# Patient Record
Sex: FEMALE | Race: WHITE | NOT HISPANIC OR LATINO | URBAN - METROPOLITAN AREA
[De-identification: names, ages, dates, MRNs, and addresses within clinical notes are randomized per-mention and may not be internally consistent; named-entity substitution may affect disease eponyms.]

---

## 2017-04-06 ENCOUNTER — INPATIENT (INPATIENT)
Facility: HOSPITAL | Age: 67
LOS: 4 days | Discharge: HOME | End: 2017-04-11
Attending: INTERNAL MEDICINE | Admitting: INTERNAL MEDICINE
Payer: COMMERCIAL

## 2017-04-06 VITALS
HEIGHT: 67.5 IN | SYSTOLIC BLOOD PRESSURE: 141 MMHG | TEMPERATURE: 98 F | WEIGHT: 175.05 LBS | HEART RATE: 66 BPM | OXYGEN SATURATION: 98 % | DIASTOLIC BLOOD PRESSURE: 90 MMHG | RESPIRATION RATE: 20 BRPM

## 2017-04-06 DIAGNOSIS — F32.9 MAJOR DEPRESSIVE DISORDER, SINGLE EPISODE, UNSPECIFIED: ICD-10-CM

## 2017-04-06 DIAGNOSIS — I10 ESSENTIAL (PRIMARY) HYPERTENSION: ICD-10-CM

## 2017-04-06 DIAGNOSIS — Z95.5 PRESENCE OF CORONARY ANGIOPLASTY IMPLANT AND GRAFT: Chronic | ICD-10-CM

## 2017-04-06 DIAGNOSIS — G56.01 CARPAL TUNNEL SYNDROME, RIGHT UPPER LIMB: Chronic | ICD-10-CM

## 2017-04-06 DIAGNOSIS — R07.9 CHEST PAIN, UNSPECIFIED: ICD-10-CM

## 2017-04-06 DIAGNOSIS — Z98.890 OTHER SPECIFIED POSTPROCEDURAL STATES: Chronic | ICD-10-CM

## 2017-04-06 DIAGNOSIS — E78.5 HYPERLIPIDEMIA, UNSPECIFIED: ICD-10-CM

## 2017-04-06 LAB
ALBUMIN SERPL ELPH-MCNC: 4.3 G/DL — SIGNIFICANT CHANGE UP (ref 3.3–5)
ALP SERPL-CCNC: 73 U/L — SIGNIFICANT CHANGE UP (ref 40–120)
ALT FLD-CCNC: 17 U/L — SIGNIFICANT CHANGE UP (ref 4–33)
APTT BLD: 30.8 SEC — SIGNIFICANT CHANGE UP (ref 27.5–37.4)
AST SERPL-CCNC: 22 U/L — SIGNIFICANT CHANGE UP (ref 4–32)
BASE EXCESS BLDV CALC-SCNC: 4.3 MMOL/L — SIGNIFICANT CHANGE UP
BASOPHILS # BLD AUTO: 0.02 K/UL — SIGNIFICANT CHANGE UP (ref 0–0.2)
BASOPHILS NFR BLD AUTO: 0.2 % — SIGNIFICANT CHANGE UP (ref 0–2)
BILIRUB SERPL-MCNC: 0.6 MG/DL — SIGNIFICANT CHANGE UP (ref 0.2–1.2)
BLOOD GAS VENOUS - CREATININE: 0.53 MG/DL — SIGNIFICANT CHANGE UP (ref 0.5–1.3)
BUN SERPL-MCNC: 20 MG/DL — SIGNIFICANT CHANGE UP (ref 7–23)
CALCIUM SERPL-MCNC: 9.7 MG/DL — SIGNIFICANT CHANGE UP (ref 8.4–10.5)
CHLORIDE BLDV-SCNC: 112 MMOL/L — HIGH (ref 96–108)
CHLORIDE SERPL-SCNC: 102 MMOL/L — SIGNIFICANT CHANGE UP (ref 98–107)
CK MB BLD-MCNC: 2.84 NG/ML — SIGNIFICANT CHANGE UP (ref 1–4.7)
CK MB BLD-MCNC: 3.25 NG/ML — SIGNIFICANT CHANGE UP (ref 1–4.7)
CK MB BLD-MCNC: SIGNIFICANT CHANGE UP (ref 0–2.5)
CK MB BLD-MCNC: SIGNIFICANT CHANGE UP (ref 0–2.5)
CK SERPL-CCNC: 115 U/L — SIGNIFICANT CHANGE UP (ref 25–170)
CK SERPL-CCNC: 136 U/L — SIGNIFICANT CHANGE UP (ref 25–170)
CO2 SERPL-SCNC: 24 MMOL/L — SIGNIFICANT CHANGE UP (ref 22–31)
CREAT SERPL-MCNC: 0.65 MG/DL — SIGNIFICANT CHANGE UP (ref 0.5–1.3)
D DIMER BLD IA.RAPID-MCNC: 200 NG/ML — SIGNIFICANT CHANGE UP
EOSINOPHIL # BLD AUTO: 0.43 K/UL — SIGNIFICANT CHANGE UP (ref 0–0.5)
EOSINOPHIL NFR BLD AUTO: 5.1 % — SIGNIFICANT CHANGE UP (ref 0–6)
GAS PNL BLDV: 134 MMOL/L — LOW (ref 136–146)
GLUCOSE BLDV-MCNC: 102 — HIGH (ref 70–99)
GLUCOSE SERPL-MCNC: 103 MG/DL — HIGH (ref 70–99)
HCO3 BLDV-SCNC: 27 MMOL/L — SIGNIFICANT CHANGE UP (ref 20–27)
HCT VFR BLD CALC: 41 % — SIGNIFICANT CHANGE UP (ref 34.5–45)
HCT VFR BLDV CALC: 44.2 % — SIGNIFICANT CHANGE UP (ref 34.5–45)
HGB BLD-MCNC: 14 G/DL — SIGNIFICANT CHANGE UP (ref 11.5–15.5)
HGB BLDV-MCNC: 14.4 G/DL — SIGNIFICANT CHANGE UP (ref 11.5–15.5)
IMM GRANULOCYTES NFR BLD AUTO: 0.2 % — SIGNIFICANT CHANGE UP (ref 0–1.5)
INR BLD: 0.93 — SIGNIFICANT CHANGE UP (ref 0.88–1.17)
LACTATE BLDV-MCNC: 1.6 MMOL/L — SIGNIFICANT CHANGE UP (ref 0.5–2)
LYMPHOCYTES # BLD AUTO: 1.87 K/UL — SIGNIFICANT CHANGE UP (ref 1–3.3)
LYMPHOCYTES # BLD AUTO: 22.2 % — SIGNIFICANT CHANGE UP (ref 13–44)
MAGNESIUM SERPL-MCNC: 2 MG/DL — SIGNIFICANT CHANGE UP (ref 1.6–2.6)
MCHC RBC-ENTMCNC: 34 PG — SIGNIFICANT CHANGE UP (ref 27–34)
MCHC RBC-ENTMCNC: 34.1 % — SIGNIFICANT CHANGE UP (ref 32–36)
MCV RBC AUTO: 99.5 FL — SIGNIFICANT CHANGE UP (ref 80–100)
MONOCYTES # BLD AUTO: 0.71 K/UL — SIGNIFICANT CHANGE UP (ref 0–0.9)
MONOCYTES NFR BLD AUTO: 8.4 % — SIGNIFICANT CHANGE UP (ref 2–14)
NEUTROPHILS # BLD AUTO: 5.38 K/UL — SIGNIFICANT CHANGE UP (ref 1.8–7.4)
NEUTROPHILS NFR BLD AUTO: 63.9 % — SIGNIFICANT CHANGE UP (ref 43–77)
PCO2 BLDV: 44 MMHG — SIGNIFICANT CHANGE UP (ref 41–51)
PH BLDV: 7.43 PH — SIGNIFICANT CHANGE UP (ref 7.32–7.43)
PLATELET # BLD AUTO: 274 K/UL — SIGNIFICANT CHANGE UP (ref 150–400)
PMV BLD: 12 FL — SIGNIFICANT CHANGE UP (ref 7–13)
PO2 BLDV: 26 MMHG — LOW (ref 35–40)
POTASSIUM BLDV-SCNC: 3.8 MMOL/L — SIGNIFICANT CHANGE UP (ref 3.4–4.5)
POTASSIUM SERPL-MCNC: 4.3 MMOL/L — SIGNIFICANT CHANGE UP (ref 3.5–5.3)
POTASSIUM SERPL-SCNC: 4.3 MMOL/L — SIGNIFICANT CHANGE UP (ref 3.5–5.3)
PROT SERPL-MCNC: 7.4 G/DL — SIGNIFICANT CHANGE UP (ref 6–8.3)
PROTHROM AB SERPL-ACNC: 10.4 SEC — SIGNIFICANT CHANGE UP (ref 9.8–13.1)
RBC # BLD: 4.12 M/UL — SIGNIFICANT CHANGE UP (ref 3.8–5.2)
RBC # FLD: 13.2 % — SIGNIFICANT CHANGE UP (ref 10.3–14.5)
SAO2 % BLDV: 45.8 % — LOW (ref 60–85)
SODIUM SERPL-SCNC: 142 MMOL/L — SIGNIFICANT CHANGE UP (ref 135–145)
TROPONIN T SERPL-MCNC: < 0.06 NG/ML — SIGNIFICANT CHANGE UP (ref 0–0.06)
TROPONIN T SERPL-MCNC: < 0.06 NG/ML — SIGNIFICANT CHANGE UP (ref 0–0.06)
TSH SERPL-MCNC: 1.26 UIU/ML — SIGNIFICANT CHANGE UP (ref 0.27–4.2)
WBC # BLD: 8.43 K/UL — SIGNIFICANT CHANGE UP (ref 3.8–10.5)
WBC # FLD AUTO: 8.43 K/UL — SIGNIFICANT CHANGE UP (ref 3.8–10.5)

## 2017-04-06 PROCEDURE — 71020: CPT | Mod: 26

## 2017-04-06 PROCEDURE — 93970 EXTREMITY STUDY: CPT | Mod: 26

## 2017-04-06 RX ORDER — CITALOPRAM 10 MG/1
20 TABLET, FILM COATED ORAL DAILY
Qty: 0 | Refills: 0 | Status: DISCONTINUED | OUTPATIENT
Start: 2017-04-06 | End: 2017-04-11

## 2017-04-06 RX ORDER — CARVEDILOL PHOSPHATE 80 MG/1
6.25 CAPSULE, EXTENDED RELEASE ORAL EVERY 12 HOURS
Qty: 0 | Refills: 0 | Status: DISCONTINUED | OUTPATIENT
Start: 2017-04-06 | End: 2017-04-11

## 2017-04-06 RX ORDER — VALSARTAN 80 MG/1
320 TABLET ORAL DAILY
Qty: 0 | Refills: 0 | Status: DISCONTINUED | OUTPATIENT
Start: 2017-04-06 | End: 2017-04-11

## 2017-04-06 RX ORDER — SODIUM CHLORIDE 9 MG/ML
3 INJECTION INTRAMUSCULAR; INTRAVENOUS; SUBCUTANEOUS EVERY 8 HOURS
Qty: 0 | Refills: 0 | Status: DISCONTINUED | OUTPATIENT
Start: 2017-04-06 | End: 2017-04-11

## 2017-04-06 RX ORDER — ACETAMINOPHEN 500 MG
650 TABLET ORAL ONCE
Qty: 0 | Refills: 0 | Status: COMPLETED | OUTPATIENT
Start: 2017-04-06 | End: 2017-04-06

## 2017-04-06 RX ORDER — IBUPROFEN 200 MG
600 TABLET ORAL ONCE
Qty: 0 | Refills: 0 | Status: COMPLETED | OUTPATIENT
Start: 2017-04-06 | End: 2017-04-06

## 2017-04-06 RX ORDER — ACETAMINOPHEN 500 MG
650 TABLET ORAL EVERY 6 HOURS
Qty: 0 | Refills: 0 | Status: DISCONTINUED | OUTPATIENT
Start: 2017-04-06 | End: 2017-04-11

## 2017-04-06 RX ORDER — ASPIRIN/CALCIUM CARB/MAGNESIUM 324 MG
81 TABLET ORAL DAILY
Qty: 0 | Refills: 0 | Status: DISCONTINUED | OUTPATIENT
Start: 2017-04-06 | End: 2017-04-11

## 2017-04-06 RX ORDER — ENOXAPARIN SODIUM 100 MG/ML
40 INJECTION SUBCUTANEOUS EVERY 24 HOURS
Qty: 0 | Refills: 0 | Status: DISCONTINUED | OUTPATIENT
Start: 2017-04-06 | End: 2017-04-10

## 2017-04-06 RX ORDER — AMLODIPINE BESYLATE 2.5 MG/1
10 TABLET ORAL DAILY
Qty: 0 | Refills: 0 | Status: DISCONTINUED | OUTPATIENT
Start: 2017-04-06 | End: 2017-04-11

## 2017-04-06 RX ORDER — ATORVASTATIN CALCIUM 80 MG/1
20 TABLET, FILM COATED ORAL AT BEDTIME
Qty: 0 | Refills: 0 | Status: DISCONTINUED | OUTPATIENT
Start: 2017-04-06 | End: 2017-04-11

## 2017-04-06 RX ORDER — IBUPROFEN 200 MG
400 TABLET ORAL ONCE
Qty: 0 | Refills: 0 | Status: DISCONTINUED | OUTPATIENT
Start: 2017-04-06 | End: 2017-04-06

## 2017-04-06 RX ORDER — NICOTINE POLACRILEX 2 MG
1 GUM BUCCAL DAILY
Qty: 0 | Refills: 0 | Status: DISCONTINUED | OUTPATIENT
Start: 2017-04-06 | End: 2017-04-11

## 2017-04-06 RX ADMIN — Medication 600 MILLIGRAM(S): at 09:03

## 2017-04-06 RX ADMIN — Medication 1 PATCH: at 16:40

## 2017-04-06 RX ADMIN — Medication 650 MILLIGRAM(S): at 10:30

## 2017-04-06 RX ADMIN — Medication 600 MILLIGRAM(S): at 10:30

## 2017-04-06 RX ADMIN — SODIUM CHLORIDE 3 MILLILITER(S): 9 INJECTION INTRAMUSCULAR; INTRAVENOUS; SUBCUTANEOUS at 21:34

## 2017-04-06 RX ADMIN — ENOXAPARIN SODIUM 40 MILLIGRAM(S): 100 INJECTION SUBCUTANEOUS at 16:41

## 2017-04-06 RX ADMIN — SODIUM CHLORIDE 3 MILLILITER(S): 9 INJECTION INTRAMUSCULAR; INTRAVENOUS; SUBCUTANEOUS at 15:17

## 2017-04-06 RX ADMIN — ATORVASTATIN CALCIUM 20 MILLIGRAM(S): 80 TABLET, FILM COATED ORAL at 21:30

## 2017-04-06 RX ADMIN — Medication 650 MILLIGRAM(S): at 09:00

## 2017-04-06 NOTE — ED PROVIDER NOTE - FAMILY HISTORY
Father  Still living? Unknown  Family history of myocardial infarction, Age at diagnosis: Age Unknown     Sibling  Still living? Unknown  Family history of myocardial infarction, Age at diagnosis: Age Unknown

## 2017-04-06 NOTE — H&P ADULT. - ASSESSMENT
65 y/o female, with a PmHx of HTN, HLD, Bradycardia, CAD w/3 cardiac stents, Depression, Gerd, is being admitted to telemetry for chest pain r/o acs.

## 2017-04-06 NOTE — H&P ADULT. - NEGATIVE CARDIOVASCULAR SYMPTOMS
no orthopnea/no peripheral edema/no paroxysmal nocturnal dyspnea/no dyspnea on exertion/no palpitations

## 2017-04-06 NOTE — ED ADULT NURSE NOTE - PMH
Bradycardia with 41 - 50 beats per minute    Bradycardia with 51 - 60 beats per minute    CAD (coronary artery disease)  3 stents  GERD (gastroesophageal reflux disease)    Reflux gastritis    Smoker

## 2017-04-06 NOTE — ED PROVIDER NOTE - PROGRESS NOTE DETAILS
Benjie REDDY: Initial laboratory studies wnl, including normal troponin. Bedside US by Dr. Klein and US team revealed normal EF, no pericardial effusions, no pleural effusions, and no pulmonary edema. Added on D-dimer to r/o PE. Discussed with pt's cardiology group, who will come see pt shortly.

## 2017-04-06 NOTE — H&P ADULT. - FAMILY HISTORY
Sibling  Still living? Yes, Estimated age: Age Unknown  Family history of heart disease, Age at diagnosis: Age Unknown     Grandparent  Still living? No  Family history of myocardial infarction, Age at diagnosis: Age Unknown  Family history of breast cancer, Age at diagnosis: Age Unknown     Father  Still living? No  Family history of Alzheimer's disease, Age at diagnosis: Age Unknown

## 2017-04-06 NOTE — H&P ADULT. - NEGATIVE OPHTHALMOLOGIC SYMPTOMS
no photophobia/no loss of vision L/no loss of vision R/no blurred vision R/no blurred vision L/no diplopia

## 2017-04-06 NOTE — H&P ADULT. - PSH
Carpal tunnel syndrome, bilateral  b/l wrist surgery for carpal tunnel syndrome   delivery delivered  x2  S/P foot surgery, right  Bunionectomy and hammer toed 2016  Stented coronary artery  x 3

## 2017-04-06 NOTE — H&P ADULT. - PMH
Bradycardia    CAD (coronary artery disease)  3 stents  Depression    GERD (gastroesophageal reflux disease)    Hyperlipidemia    Hypertension    Reflux gastritis    Smoker

## 2017-04-06 NOTE — ED ADULT NURSE NOTE - OBJECTIVE STATEMENT
Pt received into trauma room B co left sided CP 6/10 that radiates to the back. Pt states pain started yesterday and feels like tightness, as if someone were squeezing her chest. Pt also co diaphoresis last night that woke her up from sleep and Bilateral arm heaviness. Pt A&Ox4, in NAD. Pt denies SOB, ABD pain, N/V/D. Pt states she has PMH 3 cardiac stents, takes 81 mg aspirin. Awaiting MD evaluation. VS as noted. Placed on cardiac monitor, Sinus shade on monitor. 20 G IV inserted to R AC, labs sent. Family at bedside, will continue to monitor for safety and comfort. Pt received into trauma room B co left sided CP 6/10 that radiates to the back. Pt states pain started yesterday and feels like tightness, as if someone were squeezing her chest. Pt also co diaphoresis last night that woke her up from sleep and Bilateral arm heaviness. Pt A&Ox4, in NAD. Pt denies SOB, ABD pain, N/V/D. Pt states she had stress test 2 weeks ago at [facility] and was notrmal; also states she has PMH 3 cardiac stents, takes 81 mg aspirin. Awaiting MD evaluation. VS as noted. Placed on cardiac monitor, Sinus shade on monitor. 20 G IV inserted to R AC, labs sent. Family at bedside, will continue to monitor for safety and comfort. Pt received into trauma room B co left sided CP 6/10 that radiates to the back. Pt states pain started yesterday and feels like tightness, as if someone were squeezing her chest. Pt also co diaphoresis last night that woke her up from sleep and Bilateral arm heaviness. Pt A&Ox4, in NAD. Pt denies SOB, ABD pain, N/V/D. Pt states she had stress test 2 weeks ago at [facility] and was notrmal; also states she has PMH 3 cardiac stents, takes 81 mg aspirin. Awaiting MD evaluation. VS as noted. Placed on cardiac monitor, Sinus shade on monitor. 20 G IV inserted to R AC, labs sent. Family at bedside, will continue to monitor for safety and comfort.  Received report from PM RN. Pt here for c/o chest discomfort and evaluated by MD. IVL in place at right AC 20 gauge and pt received a bedside ultrasound. Awaiting further orders, results and disposition.  MINESH Pires

## 2017-04-06 NOTE — ED ADULT TRIAGE NOTE - CHIEF COMPLAINT QUOTE
Left sided chest pain since last night which woke her up from her sleep . pain mike the left arm and back. pt was sweating when she had the pain. denies SOB or nausea. pt has PMH of cardiac stents x3 , CAD. HTN, cholesterol Left sided chest pain since last night which woke her up from her sleep . pain radiates under the left arm and back. pt was sweating when she had the pain. denies SOB or nausea. pt has PMH of cardiac stents x3 , CAD. HTN, cholesterol

## 2017-04-06 NOTE — ED PROVIDER NOTE - MEDICAL DECISION MAKING DETAILS
66F w/ L sided chest pain, most likely MSK in nature, but with some concerning features for ACS despite recent stress and echo. HEART score 5. Does not PERC out. Wells PE score 0. Basic and cardiac labs, CXR. Will trial tylenol and motrin for relief. ED US team cs for bedside echo. Will reach out to cardiologist group to discuss case.

## 2017-04-06 NOTE — ED ADULT NURSE NOTE - CHIEF COMPLAINT QUOTE
Left sided chest pain since last night which woke her up from her sleep . pain radiates under the left arm and back. pt was sweating when she had the pain. denies SOB or nausea. pt has PMH of cardiac stents x3 , CAD. HTN, cholesterol

## 2017-04-06 NOTE — ED PROVIDER NOTE - PSH
Carpal tunnel syndrome, bilateral  b/l wrist surgery for carpal tunnel syndrome   delivery delivered  x2  Stented coronary artery  x 3

## 2017-04-06 NOTE — H&P ADULT. - RS GEN PE MLT RESP DETAILS PC
no chest wall tenderness/respirations non-labored/breath sounds equal/airway patent/good air movement/clear to auscultation bilaterally

## 2017-04-06 NOTE — ED PROVIDER NOTE - ATTENDING CONTRIBUTION TO CARE
DR. Del Cid, ATTENDING MD-  I performed a face to face bedside interview with patient regarding history of present illness, review of symptoms and past medical history. I completed an independent physical exam.  I have discussed patient's plan of care with the resident.   Documentation as above in the note.     67yo female hx of multiple cardiac problems p/w chest pain since yesterday. It has been persistent since yesterday, but sometimes is worse. Described as tightness, now radiating around left chest. No associated sob, but is worse with deep inspiration. Recent stress negative 3 weeks ago. 1 episode of diaphoresis last night for 30 min. Exam:  well appearing, nad lungs: CTAB Heart: rrr no murmur Abd: soft, NTND Ext: no pedal edema,  Plan: will r/o acs, pt with persistent pain since yesterday morning and normal stress test recently. Possible msk pain, will tx, given pt age and pain worse with deep inspiration, cannot r/o PE without d-dimer, although doubt PE

## 2017-04-06 NOTE — ED PROVIDER NOTE - OBJECTIVE STATEMENT
65 yo female, PMH of GERD/gastritis, CAD with 3 stents, chronic asymptomatic bradycardia (HR ranges from 40s - 50s per pt) and chronic / active smoker (1 ppd x 40) seen by cardiologist within the last month, with an echo and stress  p/w chest pain since yesterday morning. The pain is a left sided pain which is persistent. 67 yo female, PMH of GERD/gastritis, CAD with 3 stents, chronic asymptomatic bradycardia (HR ranges from 40s - 50s per pt) and chronic / active smoker (1 ppd x 40) seen by cardiologist within the last month, with an echo and stress wnl p/w chest pain since yesterday morning. The pain is a left sided pain which is persistent. Pt works in [employer] at [employer] and does heavy lifting on a daily basis. Pt states pain is worse with coughing and laughing. Overnight she was woken up by the left sided chest pain, and pt states at that time she was diaphoretic. She states the pain wraps around to the L scapula and L arm. Pt took 162mg ASA at home this AM prior to coming in. Pt denies SOB, n/v/d, abd pain, LE swelling or pain, fevers, or chills.

## 2017-04-07 LAB
BUN SERPL-MCNC: 16 MG/DL — SIGNIFICANT CHANGE UP (ref 7–23)
CALCIUM SERPL-MCNC: 9.3 MG/DL — SIGNIFICANT CHANGE UP (ref 8.4–10.5)
CHLORIDE SERPL-SCNC: 104 MMOL/L — SIGNIFICANT CHANGE UP (ref 98–107)
CHOLEST SERPL-MCNC: 135 MG/DL — SIGNIFICANT CHANGE UP (ref 120–199)
CO2 SERPL-SCNC: 22 MMOL/L — SIGNIFICANT CHANGE UP (ref 22–31)
CREAT SERPL-MCNC: 0.56 MG/DL — SIGNIFICANT CHANGE UP (ref 0.5–1.3)
GLUCOSE SERPL-MCNC: 92 MG/DL — SIGNIFICANT CHANGE UP (ref 70–99)
HCT VFR BLD CALC: 40.7 % — SIGNIFICANT CHANGE UP (ref 34.5–45)
HDLC SERPL-MCNC: 52 MG/DL — SIGNIFICANT CHANGE UP (ref 45–65)
HGB BLD-MCNC: 13.5 G/DL — SIGNIFICANT CHANGE UP (ref 11.5–15.5)
LIPID PNL WITH DIRECT LDL SERPL: 76 MG/DL — SIGNIFICANT CHANGE UP
MCHC RBC-ENTMCNC: 33.2 % — SIGNIFICANT CHANGE UP (ref 32–36)
MCHC RBC-ENTMCNC: 33.4 PG — SIGNIFICANT CHANGE UP (ref 27–34)
MCV RBC AUTO: 100.7 FL — HIGH (ref 80–100)
PLATELET # BLD AUTO: 249 K/UL — SIGNIFICANT CHANGE UP (ref 150–400)
PMV BLD: 12.3 FL — SIGNIFICANT CHANGE UP (ref 7–13)
POTASSIUM SERPL-MCNC: 3.9 MMOL/L — SIGNIFICANT CHANGE UP (ref 3.5–5.3)
POTASSIUM SERPL-SCNC: 3.9 MMOL/L — SIGNIFICANT CHANGE UP (ref 3.5–5.3)
RBC # BLD: 4.04 M/UL — SIGNIFICANT CHANGE UP (ref 3.8–5.2)
RBC # FLD: 13.4 % — SIGNIFICANT CHANGE UP (ref 10.3–14.5)
SODIUM SERPL-SCNC: 143 MMOL/L — SIGNIFICANT CHANGE UP (ref 135–145)
TRIGL SERPL-MCNC: 61 MG/DL — SIGNIFICANT CHANGE UP (ref 10–149)
WBC # BLD: 6.46 K/UL — SIGNIFICANT CHANGE UP (ref 3.8–10.5)
WBC # FLD AUTO: 6.46 K/UL — SIGNIFICANT CHANGE UP (ref 3.8–10.5)

## 2017-04-07 PROCEDURE — 71275 CT ANGIOGRAPHY CHEST: CPT | Mod: 26

## 2017-04-07 PROCEDURE — 74174 CTA ABD&PLVS W/CONTRAST: CPT | Mod: 26

## 2017-04-07 RX ADMIN — Medication 81 MILLIGRAM(S): at 12:42

## 2017-04-07 RX ADMIN — CITALOPRAM 20 MILLIGRAM(S): 10 TABLET, FILM COATED ORAL at 12:42

## 2017-04-07 RX ADMIN — Medication 1 PATCH: at 12:42

## 2017-04-07 RX ADMIN — SODIUM CHLORIDE 3 MILLILITER(S): 9 INJECTION INTRAMUSCULAR; INTRAVENOUS; SUBCUTANEOUS at 23:17

## 2017-04-07 RX ADMIN — VALSARTAN 320 MILLIGRAM(S): 80 TABLET ORAL at 05:28

## 2017-04-07 RX ADMIN — SODIUM CHLORIDE 3 MILLILITER(S): 9 INJECTION INTRAMUSCULAR; INTRAVENOUS; SUBCUTANEOUS at 05:28

## 2017-04-07 RX ADMIN — ENOXAPARIN SODIUM 40 MILLIGRAM(S): 100 INJECTION SUBCUTANEOUS at 17:20

## 2017-04-07 RX ADMIN — SODIUM CHLORIDE 3 MILLILITER(S): 9 INJECTION INTRAMUSCULAR; INTRAVENOUS; SUBCUTANEOUS at 14:47

## 2017-04-07 RX ADMIN — CARVEDILOL PHOSPHATE 6.25 MILLIGRAM(S): 80 CAPSULE, EXTENDED RELEASE ORAL at 05:28

## 2017-04-07 RX ADMIN — ATORVASTATIN CALCIUM 20 MILLIGRAM(S): 80 TABLET, FILM COATED ORAL at 23:15

## 2017-04-07 RX ADMIN — Medication 1 PATCH: at 16:10

## 2017-04-07 RX ADMIN — AMLODIPINE BESYLATE 10 MILLIGRAM(S): 2.5 TABLET ORAL at 05:28

## 2017-04-08 LAB
BUN SERPL-MCNC: 22 MG/DL — SIGNIFICANT CHANGE UP (ref 7–23)
CALCIUM SERPL-MCNC: 9.5 MG/DL — SIGNIFICANT CHANGE UP (ref 8.4–10.5)
CHLORIDE SERPL-SCNC: 102 MMOL/L — SIGNIFICANT CHANGE UP (ref 98–107)
CO2 SERPL-SCNC: 25 MMOL/L — SIGNIFICANT CHANGE UP (ref 22–31)
CREAT SERPL-MCNC: 0.64 MG/DL — SIGNIFICANT CHANGE UP (ref 0.5–1.3)
GLUCOSE SERPL-MCNC: 92 MG/DL — SIGNIFICANT CHANGE UP (ref 70–99)
HCT VFR BLD CALC: 41.8 % — SIGNIFICANT CHANGE UP (ref 34.5–45)
HGB BLD-MCNC: 13.8 G/DL — SIGNIFICANT CHANGE UP (ref 11.5–15.5)
MAGNESIUM SERPL-MCNC: 2.1 MG/DL — SIGNIFICANT CHANGE UP (ref 1.6–2.6)
MCHC RBC-ENTMCNC: 33 % — SIGNIFICANT CHANGE UP (ref 32–36)
MCHC RBC-ENTMCNC: 33.4 PG — SIGNIFICANT CHANGE UP (ref 27–34)
MCV RBC AUTO: 101.2 FL — HIGH (ref 80–100)
PLATELET # BLD AUTO: 263 K/UL — SIGNIFICANT CHANGE UP (ref 150–400)
PMV BLD: 12.2 FL — SIGNIFICANT CHANGE UP (ref 7–13)
POTASSIUM SERPL-MCNC: 4 MMOL/L — SIGNIFICANT CHANGE UP (ref 3.5–5.3)
POTASSIUM SERPL-SCNC: 4 MMOL/L — SIGNIFICANT CHANGE UP (ref 3.5–5.3)
RBC # BLD: 4.13 M/UL — SIGNIFICANT CHANGE UP (ref 3.8–5.2)
RBC # FLD: 13.5 % — SIGNIFICANT CHANGE UP (ref 10.3–14.5)
SODIUM SERPL-SCNC: 143 MMOL/L — SIGNIFICANT CHANGE UP (ref 135–145)
WBC # BLD: 7.26 K/UL — SIGNIFICANT CHANGE UP (ref 3.8–10.5)
WBC # FLD AUTO: 7.26 K/UL — SIGNIFICANT CHANGE UP (ref 3.8–10.5)

## 2017-04-08 PROCEDURE — 93306 TTE W/DOPPLER COMPLETE: CPT | Mod: 26

## 2017-04-08 RX ADMIN — ENOXAPARIN SODIUM 40 MILLIGRAM(S): 100 INJECTION SUBCUTANEOUS at 18:02

## 2017-04-08 RX ADMIN — VALSARTAN 320 MILLIGRAM(S): 80 TABLET ORAL at 06:02

## 2017-04-08 RX ADMIN — Medication 1 PATCH: at 11:30

## 2017-04-08 RX ADMIN — SODIUM CHLORIDE 3 MILLILITER(S): 9 INJECTION INTRAMUSCULAR; INTRAVENOUS; SUBCUTANEOUS at 06:04

## 2017-04-08 RX ADMIN — Medication 1 PATCH: at 13:19

## 2017-04-08 RX ADMIN — CARVEDILOL PHOSPHATE 6.25 MILLIGRAM(S): 80 CAPSULE, EXTENDED RELEASE ORAL at 06:02

## 2017-04-08 RX ADMIN — SODIUM CHLORIDE 3 MILLILITER(S): 9 INJECTION INTRAMUSCULAR; INTRAVENOUS; SUBCUTANEOUS at 21:50

## 2017-04-08 RX ADMIN — CITALOPRAM 20 MILLIGRAM(S): 10 TABLET, FILM COATED ORAL at 11:30

## 2017-04-08 RX ADMIN — AMLODIPINE BESYLATE 10 MILLIGRAM(S): 2.5 TABLET ORAL at 06:02

## 2017-04-08 RX ADMIN — Medication 81 MILLIGRAM(S): at 11:30

## 2017-04-08 RX ADMIN — ATORVASTATIN CALCIUM 20 MILLIGRAM(S): 80 TABLET, FILM COATED ORAL at 21:49

## 2017-04-08 RX ADMIN — SODIUM CHLORIDE 3 MILLILITER(S): 9 INJECTION INTRAMUSCULAR; INTRAVENOUS; SUBCUTANEOUS at 13:21

## 2017-04-09 LAB
BUN SERPL-MCNC: 23 MG/DL — SIGNIFICANT CHANGE UP (ref 7–23)
CALCIUM SERPL-MCNC: 10 MG/DL — SIGNIFICANT CHANGE UP (ref 8.4–10.5)
CHLORIDE SERPL-SCNC: 101 MMOL/L — SIGNIFICANT CHANGE UP (ref 98–107)
CO2 SERPL-SCNC: 26 MMOL/L — SIGNIFICANT CHANGE UP (ref 22–31)
CREAT SERPL-MCNC: 0.78 MG/DL — SIGNIFICANT CHANGE UP (ref 0.5–1.3)
GLUCOSE SERPL-MCNC: 136 MG/DL — HIGH (ref 70–99)
HBA1C BLD-MCNC: 5.4 % — SIGNIFICANT CHANGE UP (ref 4–5.6)
HCT VFR BLD CALC: 44.7 % — SIGNIFICANT CHANGE UP (ref 34.5–45)
HGB BLD-MCNC: 14.7 G/DL — SIGNIFICANT CHANGE UP (ref 11.5–15.5)
MCHC RBC-ENTMCNC: 32.9 % — SIGNIFICANT CHANGE UP (ref 32–36)
MCHC RBC-ENTMCNC: 33.5 PG — SIGNIFICANT CHANGE UP (ref 27–34)
MCV RBC AUTO: 101.8 FL — HIGH (ref 80–100)
PLATELET # BLD AUTO: 276 K/UL — SIGNIFICANT CHANGE UP (ref 150–400)
PMV BLD: 12.4 FL — SIGNIFICANT CHANGE UP (ref 7–13)
POTASSIUM SERPL-MCNC: 4.3 MMOL/L — SIGNIFICANT CHANGE UP (ref 3.5–5.3)
POTASSIUM SERPL-SCNC: 4.3 MMOL/L — SIGNIFICANT CHANGE UP (ref 3.5–5.3)
RBC # BLD: 4.39 M/UL — SIGNIFICANT CHANGE UP (ref 3.8–5.2)
RBC # FLD: 13.2 % — SIGNIFICANT CHANGE UP (ref 10.3–14.5)
SODIUM SERPL-SCNC: 143 MMOL/L — SIGNIFICANT CHANGE UP (ref 135–145)
WBC # BLD: 6.52 K/UL — SIGNIFICANT CHANGE UP (ref 3.8–10.5)
WBC # FLD AUTO: 6.52 K/UL — SIGNIFICANT CHANGE UP (ref 3.8–10.5)

## 2017-04-09 RX ADMIN — ENOXAPARIN SODIUM 40 MILLIGRAM(S): 100 INJECTION SUBCUTANEOUS at 17:41

## 2017-04-09 RX ADMIN — CITALOPRAM 20 MILLIGRAM(S): 10 TABLET, FILM COATED ORAL at 12:41

## 2017-04-09 RX ADMIN — VALSARTAN 320 MILLIGRAM(S): 80 TABLET ORAL at 05:45

## 2017-04-09 RX ADMIN — CARVEDILOL PHOSPHATE 6.25 MILLIGRAM(S): 80 CAPSULE, EXTENDED RELEASE ORAL at 05:45

## 2017-04-09 RX ADMIN — Medication 1 PATCH: at 12:39

## 2017-04-09 RX ADMIN — Medication 1 PATCH: at 12:41

## 2017-04-09 RX ADMIN — SODIUM CHLORIDE 3 MILLILITER(S): 9 INJECTION INTRAMUSCULAR; INTRAVENOUS; SUBCUTANEOUS at 05:47

## 2017-04-09 RX ADMIN — SODIUM CHLORIDE 3 MILLILITER(S): 9 INJECTION INTRAMUSCULAR; INTRAVENOUS; SUBCUTANEOUS at 22:23

## 2017-04-09 RX ADMIN — AMLODIPINE BESYLATE 10 MILLIGRAM(S): 2.5 TABLET ORAL at 05:46

## 2017-04-09 RX ADMIN — ATORVASTATIN CALCIUM 20 MILLIGRAM(S): 80 TABLET, FILM COATED ORAL at 22:22

## 2017-04-09 RX ADMIN — Medication 81 MILLIGRAM(S): at 12:41

## 2017-04-09 RX ADMIN — SODIUM CHLORIDE 3 MILLILITER(S): 9 INJECTION INTRAMUSCULAR; INTRAVENOUS; SUBCUTANEOUS at 14:41

## 2017-04-10 LAB
BUN SERPL-MCNC: 24 MG/DL — HIGH (ref 7–23)
CALCIUM SERPL-MCNC: 9.4 MG/DL — SIGNIFICANT CHANGE UP (ref 8.4–10.5)
CHLORIDE SERPL-SCNC: 104 MMOL/L — SIGNIFICANT CHANGE UP (ref 98–107)
CO2 SERPL-SCNC: 24 MMOL/L — SIGNIFICANT CHANGE UP (ref 22–31)
CREAT SERPL-MCNC: 0.6 MG/DL — SIGNIFICANT CHANGE UP (ref 0.5–1.3)
GLUCOSE SERPL-MCNC: 89 MG/DL — SIGNIFICANT CHANGE UP (ref 70–99)
HCT VFR BLD CALC: 41 % — SIGNIFICANT CHANGE UP (ref 34.5–45)
HGB BLD-MCNC: 13.6 G/DL — SIGNIFICANT CHANGE UP (ref 11.5–15.5)
MAGNESIUM SERPL-MCNC: 2 MG/DL — SIGNIFICANT CHANGE UP (ref 1.6–2.6)
MCHC RBC-ENTMCNC: 33.2 % — SIGNIFICANT CHANGE UP (ref 32–36)
MCHC RBC-ENTMCNC: 33.4 PG — SIGNIFICANT CHANGE UP (ref 27–34)
MCV RBC AUTO: 100.7 FL — HIGH (ref 80–100)
PLATELET # BLD AUTO: 251 K/UL — SIGNIFICANT CHANGE UP (ref 150–400)
PMV BLD: 12 FL — SIGNIFICANT CHANGE UP (ref 7–13)
POTASSIUM SERPL-MCNC: 3.7 MMOL/L — SIGNIFICANT CHANGE UP (ref 3.5–5.3)
POTASSIUM SERPL-SCNC: 3.7 MMOL/L — SIGNIFICANT CHANGE UP (ref 3.5–5.3)
RBC # BLD: 4.07 M/UL — SIGNIFICANT CHANGE UP (ref 3.8–5.2)
RBC # FLD: 13.3 % — SIGNIFICANT CHANGE UP (ref 10.3–14.5)
SODIUM SERPL-SCNC: 142 MMOL/L — SIGNIFICANT CHANGE UP (ref 135–145)
WBC # BLD: 7.77 K/UL — SIGNIFICANT CHANGE UP (ref 3.8–10.5)
WBC # FLD AUTO: 7.77 K/UL — SIGNIFICANT CHANGE UP (ref 3.8–10.5)

## 2017-04-10 PROCEDURE — 93010 ELECTROCARDIOGRAM REPORT: CPT

## 2017-04-10 RX ORDER — CLOPIDOGREL BISULFATE 75 MG/1
75 TABLET, FILM COATED ORAL DAILY
Qty: 0 | Refills: 0 | Status: DISCONTINUED | OUTPATIENT
Start: 2017-04-11 | End: 2017-04-11

## 2017-04-10 RX ORDER — SODIUM CHLORIDE 9 MG/ML
500 INJECTION INTRAMUSCULAR; INTRAVENOUS; SUBCUTANEOUS
Qty: 0 | Refills: 0 | Status: DISCONTINUED | OUTPATIENT
Start: 2017-04-10 | End: 2017-04-11

## 2017-04-10 RX ADMIN — Medication 81 MILLIGRAM(S): at 12:36

## 2017-04-10 RX ADMIN — Medication 1 PATCH: at 22:42

## 2017-04-10 RX ADMIN — VALSARTAN 320 MILLIGRAM(S): 80 TABLET ORAL at 05:13

## 2017-04-10 RX ADMIN — AMLODIPINE BESYLATE 10 MILLIGRAM(S): 2.5 TABLET ORAL at 05:13

## 2017-04-10 RX ADMIN — ATORVASTATIN CALCIUM 20 MILLIGRAM(S): 80 TABLET, FILM COATED ORAL at 21:37

## 2017-04-10 RX ADMIN — SODIUM CHLORIDE 3 MILLILITER(S): 9 INJECTION INTRAMUSCULAR; INTRAVENOUS; SUBCUTANEOUS at 05:10

## 2017-04-10 RX ADMIN — SODIUM CHLORIDE 3 MILLILITER(S): 9 INJECTION INTRAMUSCULAR; INTRAVENOUS; SUBCUTANEOUS at 21:38

## 2017-04-10 RX ADMIN — SODIUM CHLORIDE 3 MILLILITER(S): 9 INJECTION INTRAMUSCULAR; INTRAVENOUS; SUBCUTANEOUS at 15:37

## 2017-04-10 RX ADMIN — SODIUM CHLORIDE 75 MILLILITER(S): 9 INJECTION INTRAMUSCULAR; INTRAVENOUS; SUBCUTANEOUS at 15:38

## 2017-04-10 RX ADMIN — CARVEDILOL PHOSPHATE 6.25 MILLIGRAM(S): 80 CAPSULE, EXTENDED RELEASE ORAL at 05:13

## 2017-04-11 VITALS — WEIGHT: 181.88 LBS

## 2017-04-11 LAB
BUN SERPL-MCNC: 22 MG/DL — SIGNIFICANT CHANGE UP (ref 7–23)
CALCIUM SERPL-MCNC: 9.3 MG/DL — SIGNIFICANT CHANGE UP (ref 8.4–10.5)
CHLORIDE SERPL-SCNC: 103 MMOL/L — SIGNIFICANT CHANGE UP (ref 98–107)
CO2 SERPL-SCNC: 24 MMOL/L — SIGNIFICANT CHANGE UP (ref 22–31)
CREAT SERPL-MCNC: 0.68 MG/DL — SIGNIFICANT CHANGE UP (ref 0.5–1.3)
GLUCOSE SERPL-MCNC: 88 MG/DL — SIGNIFICANT CHANGE UP (ref 70–99)
HCT VFR BLD CALC: 40.1 % — SIGNIFICANT CHANGE UP (ref 34.5–45)
HGB BLD-MCNC: 13.3 G/DL — SIGNIFICANT CHANGE UP (ref 11.5–15.5)
MAGNESIUM SERPL-MCNC: 2 MG/DL — SIGNIFICANT CHANGE UP (ref 1.6–2.6)
MCHC RBC-ENTMCNC: 33.2 % — SIGNIFICANT CHANGE UP (ref 32–36)
MCHC RBC-ENTMCNC: 33.3 PG — SIGNIFICANT CHANGE UP (ref 27–34)
MCV RBC AUTO: 100.5 FL — HIGH (ref 80–100)
PLATELET # BLD AUTO: 247 K/UL — SIGNIFICANT CHANGE UP (ref 150–400)
PMV BLD: 12.3 FL — SIGNIFICANT CHANGE UP (ref 7–13)
POTASSIUM SERPL-MCNC: 4.1 MMOL/L — SIGNIFICANT CHANGE UP (ref 3.5–5.3)
POTASSIUM SERPL-SCNC: 4.1 MMOL/L — SIGNIFICANT CHANGE UP (ref 3.5–5.3)
RBC # BLD: 3.99 M/UL — SIGNIFICANT CHANGE UP (ref 3.8–5.2)
RBC # FLD: 13.1 % — SIGNIFICANT CHANGE UP (ref 10.3–14.5)
SODIUM SERPL-SCNC: 143 MMOL/L — SIGNIFICANT CHANGE UP (ref 135–145)
WBC # BLD: 7.59 K/UL — SIGNIFICANT CHANGE UP (ref 3.8–10.5)
WBC # FLD AUTO: 7.59 K/UL — SIGNIFICANT CHANGE UP (ref 3.8–10.5)

## 2017-04-11 RX ORDER — CLOPIDOGREL BISULFATE 75 MG/1
1 TABLET, FILM COATED ORAL
Qty: 30 | Refills: 0 | OUTPATIENT
Start: 2017-04-11 | End: 2017-05-11

## 2017-04-11 RX ORDER — NICOTINE POLACRILEX 2 MG
14 GUM BUCCAL
Qty: 45 | Refills: 0 | OUTPATIENT
Start: 2017-04-11 | End: 2017-05-26

## 2017-04-11 RX ORDER — ACETAMINOPHEN 500 MG
2 TABLET ORAL
Qty: 0 | Refills: 0 | COMMUNITY
Start: 2017-04-11

## 2017-04-11 RX ADMIN — Medication 1 PATCH: at 11:09

## 2017-04-11 RX ADMIN — CLOPIDOGREL BISULFATE 75 MILLIGRAM(S): 75 TABLET, FILM COATED ORAL at 11:10

## 2017-04-11 RX ADMIN — CITALOPRAM 20 MILLIGRAM(S): 10 TABLET, FILM COATED ORAL at 11:10

## 2017-04-11 RX ADMIN — Medication 81 MILLIGRAM(S): at 11:10

## 2017-04-11 RX ADMIN — SODIUM CHLORIDE 3 MILLILITER(S): 9 INJECTION INTRAMUSCULAR; INTRAVENOUS; SUBCUTANEOUS at 05:12

## 2017-04-11 RX ADMIN — AMLODIPINE BESYLATE 10 MILLIGRAM(S): 2.5 TABLET ORAL at 05:11

## 2017-04-11 NOTE — DISCHARGE NOTE ADULT - NS AS ACTIVITY OBS
No heavy lifting greater than 5-10 pounds with right wrist x one week. No strenuous activity x 3 weeks. Monitor site of procedure and notify your doctor for any redness, swelling, discharge No Heavy lifting/straining/No heavy lifting greater than 5-10 pounds with right wrist x one week. No strenuous activity x 3 weeks. Monitor site of procedure and notify your doctor for any redness, swelling, discharge/Stairs allowed/Walking-Outdoors allowed/Showering allowed/Walking-Indoors allowed

## 2017-04-11 NOTE — DISCHARGE NOTE ADULT - HOSPITAL COURSE
65 y/o female, with a PmHx of CAD w/3 stents, Gerd, Chronic Bradycardia, presented with chest pain.  EKG: Sinus shade @ 58, LVH, cardiac enzyme negative,  CXR - normal chest Xray.  Lower Extremity doppler - no evidence of bilateral lower extremity deep vein thrombosis. Nonvisualization of the left calf veins.  CT Angio chest/abdomen/pelvis was done to rule pulmonary embolism but it was negative.   Echo - EF 65%, Normal left ventricular internal dimensions and wall thicknesses.  Normal left ventricular systolic function. No segmental wall motion abnormalities.  Mild diastolic dysfunction (Stage I).   Normal right ventricular size and function. CATH: EF normal, pRCA 80 treated with drug eluting stent x2, prior RCA stent patent, Right radial site stable no hematoma, no bleeding +radial pulse.

## 2017-04-11 NOTE — DISCHARGE NOTE ADULT - OTHER SIGNIFICANT FINDINGS
65 y/o female, with a PmHx of CAD w/3 stents, Gerd, Chronic Bradycardia, presented with chest pain.  EKG: Sinus shade @ 58, LVH, cardiac enzyme negative,  CXR - normal chest Xray.  Lower Extremity doppler - no evidence of bilateral lower extremity deep vein thrombosis. Nonvisualization of the left calf veins.  CT Angio chest/abdomen/pelvis was done to rule pulmonary embolism but it was negative.   Echo - EF 65%, Normal left ventricular internal dimensions and wall thicknesses.  Normal left ventricular systolic function. No segmental wall motion abnormalities.  Mild diastolic dysfunction (Stage I).   Normal right ventricular size and function. CATH: EF normal, pRCA 80 treated with drug eluting stent x2, prior RCA stent patent, as above

## 2017-04-11 NOTE — DISCHARGE NOTE ADULT - CARE PLAN
Principal Discharge DX:	CAD (coronary artery disease)  Goal:	To prevent chest pain, heart attack and worsening coronary artery disease  Instructions for follow-up, activity and diet:	Continue aspirin and Plavix, do not stop unless instructed by physician.  Follow up with your cardiologist Dr. Mendoza on April 14, 2017 at 3:00 PM  Secondary Diagnosis:	Hyperlipidemia  Goal:	Maintain normal cholesterol levels to prevent stroke and heart attack  Instructions for follow-up, activity and diet:	Low fat diet, Continue current medications. Follow up with primary care physician and cardiologist  Secondary Diagnosis:	Hypertension  Goal:	To maintain a normal blood pressure to prevent heart attack, stroke and renal failure  Instructions for follow-up, activity and diet:	Low sodium diet, Follow up with primary care physician.  Continue anti-hypertensive medications  Secondary Diagnosis:	Depression Principal Discharge DX:	CAD (coronary artery disease)  Goal:	To prevent chest pain, heart attack and worsening coronary artery disease  Instructions for follow-up, activity and diet:	Continue aspirin and Plavix, do not stop unless instructed by physician.  Follow up with your cardiologist Dr. Mendoza on April 14, 2017 at 3:00 PM  Secondary Diagnosis:	Hyperlipidemia  Goal:	Maintain normal cholesterol levels to prevent stroke and heart attack  Instructions for follow-up, activity and diet:	Low fat diet, Continue current medications. Follow up with primary care physician and cardiologist  Secondary Diagnosis:	Hypertension  Goal:	To maintain a normal blood pressure to prevent heart attack, stroke and renal failure  Instructions for follow-up, activity and diet:	Low sodium diet, Follow up with primary care physician.  Continue anti-hypertensive medications

## 2017-04-11 NOTE — DISCHARGE NOTE ADULT - PATIENT PORTAL LINK FT
“You can access the [facility] Patient Portal, offered by [facility], by registering with the following website: [email or web address]”

## 2017-04-11 NOTE — DISCHARGE NOTE ADULT - MEDICATION SUMMARY - MEDICATIONS TO TAKE
I will START or STAY ON the medications listed below when I get home from the hospital:    aspirin 81 mg oral tablet  -- 1 tab(s) by mouth once a day  -- Indication: For Chest pain    acetaminophen 325 mg oral tablet  -- 2 tab(s) by mouth every 6 hours, As needed, mild, moderate pain  -- Indication: For Pain     CeleXA 20 mg oral tablet  -- 1 tab(s) by mouth once a day  -- Indication: For Depression    Lipitor 40 mg oral tablet  -- 1 tab(s) by mouth once a day  -- Indication: For Hyperlipidemia    Diovan  mg-25 mg oral tablet  -- 1 tab(s) by mouth once a day  -- Indication: For Hypertension    clopidogrel 75 mg oral tablet  -- 1 tab(s) by mouth once a day  -- Indication: For Coronary artery disease     Coreg 6.25 mg oral tablet  -- 1 tab(s) by mouth 2 times a day  -- Indication: For Hypertension    Norvasc 10 mg oral tablet  -- 1 tab(s) by mouth once a day  -- Indication: For Hypertension    nicotine 14 mg/24 hr transdermal film, extended release  -- 14 mg/24h by transdermal patch once a day  -- Indication: For Smoking cessation

## 2017-04-11 NOTE — DISCHARGE NOTE ADULT - PLAN OF CARE
To prevent chest pain, heart attack and worsening coronary artery disease Continue aspirin and Plavix, do not stop unless instructed by physician.  Follow up with your cardiologist Dr. Mendoza on April 14, 2017 at 3:00 PM Maintain normal cholesterol levels to prevent stroke and heart attack Low fat diet, Continue current medications. Follow up with primary care physician and cardiologist To maintain a normal blood pressure to prevent heart attack, stroke and renal failure Low sodium diet, Follow up with primary care physician.  Continue anti-hypertensive medications

## 2017-04-11 NOTE — DISCHARGE NOTE ADULT - CARE PROVIDER_API CALL
Becca Mendoza), Cardiovascular Disease; Internal Medicine  [address] [facility] [address] [place] [address]  Phone: [phone]  Fax: [phone]

## 2018-01-19 NOTE — H&P ADULT. - PROBLEM SELECTOR PLAN 1
Additional Safety/Bands:
ekg/telemetry, f/u ce x 2, mg, tsh, echo, CTA chest/abd/pelvis/plan is for a cardiac cath in Am 4/7.

## 2018-07-23 ENCOUNTER — INPATIENT (INPATIENT)
Facility: HOSPITAL | Age: 68
LOS: 1 days | Discharge: HOME | End: 2018-07-25
Attending: INTERNAL MEDICINE | Admitting: INTERNAL MEDICINE
Payer: COMMERCIAL

## 2018-07-23 VITALS
TEMPERATURE: 98 F | RESPIRATION RATE: 18 BRPM | SYSTOLIC BLOOD PRESSURE: 156 MMHG | OXYGEN SATURATION: 99 % | DIASTOLIC BLOOD PRESSURE: 85 MMHG | HEART RATE: 58 BPM

## 2018-07-23 DIAGNOSIS — R07.9 CHEST PAIN, UNSPECIFIED: ICD-10-CM

## 2018-07-23 DIAGNOSIS — G56.01 CARPAL TUNNEL SYNDROME, RIGHT UPPER LIMB: Chronic | ICD-10-CM

## 2018-07-23 DIAGNOSIS — Z95.5 PRESENCE OF CORONARY ANGIOPLASTY IMPLANT AND GRAFT: Chronic | ICD-10-CM

## 2018-07-23 DIAGNOSIS — Z98.890 OTHER SPECIFIED POSTPROCEDURAL STATES: Chronic | ICD-10-CM

## 2018-07-23 LAB
ALBUMIN SERPL ELPH-MCNC: 4.2 G/DL — SIGNIFICANT CHANGE UP (ref 3.3–5)
ALP SERPL-CCNC: 90 U/L — SIGNIFICANT CHANGE UP (ref 40–120)
ALT FLD-CCNC: 17 U/L — SIGNIFICANT CHANGE UP (ref 4–33)
AST SERPL-CCNC: 23 U/L — SIGNIFICANT CHANGE UP (ref 4–32)
BASE EXCESS BLDV CALC-SCNC: 1.4 MMOL/L — SIGNIFICANT CHANGE UP
BASOPHILS # BLD AUTO: 0.03 K/UL — SIGNIFICANT CHANGE UP (ref 0–0.2)
BASOPHILS NFR BLD AUTO: 0.4 % — SIGNIFICANT CHANGE UP (ref 0–2)
BILIRUB SERPL-MCNC: 0.5 MG/DL — SIGNIFICANT CHANGE UP (ref 0.2–1.2)
BLOOD GAS VENOUS - CREATININE: 0.56 MG/DL — SIGNIFICANT CHANGE UP (ref 0.5–1.3)
BUN SERPL-MCNC: 10 MG/DL — SIGNIFICANT CHANGE UP (ref 7–23)
CALCIUM SERPL-MCNC: 8.9 MG/DL — SIGNIFICANT CHANGE UP (ref 8.4–10.5)
CHLORIDE BLDV-SCNC: 99 MMOL/L — SIGNIFICANT CHANGE UP (ref 96–108)
CHLORIDE SERPL-SCNC: 96 MMOL/L — LOW (ref 98–107)
CO2 SERPL-SCNC: 24 MMOL/L — SIGNIFICANT CHANGE UP (ref 22–31)
CREAT SERPL-MCNC: 0.61 MG/DL — SIGNIFICANT CHANGE UP (ref 0.5–1.3)
EOSINOPHIL # BLD AUTO: 0.37 K/UL — SIGNIFICANT CHANGE UP (ref 0–0.5)
EOSINOPHIL NFR BLD AUTO: 4.3 % — SIGNIFICANT CHANGE UP (ref 0–6)
GAS PNL BLDV: 132 MMOL/L — LOW (ref 136–146)
GLUCOSE BLDV-MCNC: 86 — SIGNIFICANT CHANGE UP (ref 70–99)
GLUCOSE SERPL-MCNC: 85 MG/DL — SIGNIFICANT CHANGE UP (ref 70–99)
HCO3 BLDV-SCNC: 25 MMOL/L — SIGNIFICANT CHANGE UP (ref 20–27)
HCT VFR BLD CALC: 36.9 % — SIGNIFICANT CHANGE UP (ref 34.5–45)
HCT VFR BLDV CALC: 38.9 % — SIGNIFICANT CHANGE UP (ref 34.5–45)
HGB BLD-MCNC: 12.6 G/DL — SIGNIFICANT CHANGE UP (ref 11.5–15.5)
HGB BLDV-MCNC: 12.6 G/DL — SIGNIFICANT CHANGE UP (ref 11.5–15.5)
IMM GRANULOCYTES # BLD AUTO: 0.03 # — SIGNIFICANT CHANGE UP
IMM GRANULOCYTES NFR BLD AUTO: 0.4 % — SIGNIFICANT CHANGE UP (ref 0–1.5)
INR BLD: 0.98 — SIGNIFICANT CHANGE UP (ref 0.88–1.17)
LACTATE BLDV-MCNC: 1.7 MMOL/L — SIGNIFICANT CHANGE UP (ref 0.5–2)
LYMPHOCYTES # BLD AUTO: 2.77 K/UL — SIGNIFICANT CHANGE UP (ref 1–3.3)
LYMPHOCYTES # BLD AUTO: 32.5 % — SIGNIFICANT CHANGE UP (ref 13–44)
MCHC RBC-ENTMCNC: 33.1 PG — SIGNIFICANT CHANGE UP (ref 27–34)
MCHC RBC-ENTMCNC: 34.1 % — SIGNIFICANT CHANGE UP (ref 32–36)
MCV RBC AUTO: 96.9 FL — SIGNIFICANT CHANGE UP (ref 80–100)
MONOCYTES # BLD AUTO: 0.8 K/UL — SIGNIFICANT CHANGE UP (ref 0–0.9)
MONOCYTES NFR BLD AUTO: 9.4 % — SIGNIFICANT CHANGE UP (ref 2–14)
NEUTROPHILS # BLD AUTO: 4.52 K/UL — SIGNIFICANT CHANGE UP (ref 1.8–7.4)
NEUTROPHILS NFR BLD AUTO: 53 % — SIGNIFICANT CHANGE UP (ref 43–77)
NRBC # FLD: 0 — SIGNIFICANT CHANGE UP
PCO2 BLDV: 45 MMHG — SIGNIFICANT CHANGE UP (ref 41–51)
PH BLDV: 7.38 PH — SIGNIFICANT CHANGE UP (ref 7.32–7.43)
PLATELET # BLD AUTO: 264 K/UL — SIGNIFICANT CHANGE UP (ref 150–400)
PMV BLD: 11 FL — SIGNIFICANT CHANGE UP (ref 7–13)
PO2 BLDV: 34 MMHG — LOW (ref 35–40)
POTASSIUM BLDV-SCNC: 3.7 MMOL/L — SIGNIFICANT CHANGE UP (ref 3.4–4.5)
POTASSIUM SERPL-MCNC: 3.9 MMOL/L — SIGNIFICANT CHANGE UP (ref 3.5–5.3)
POTASSIUM SERPL-SCNC: 3.9 MMOL/L — SIGNIFICANT CHANGE UP (ref 3.5–5.3)
PROT SERPL-MCNC: 7.3 G/DL — SIGNIFICANT CHANGE UP (ref 6–8.3)
PROTHROM AB SERPL-ACNC: 11.3 SEC — SIGNIFICANT CHANGE UP (ref 9.8–13.1)
RBC # BLD: 3.81 M/UL — SIGNIFICANT CHANGE UP (ref 3.8–5.2)
RBC # FLD: 12.8 % — SIGNIFICANT CHANGE UP (ref 10.3–14.5)
SAO2 % BLDV: 59.7 % — LOW (ref 60–85)
SODIUM SERPL-SCNC: 133 MMOL/L — LOW (ref 135–145)
TROPONIN T, HIGH SENSITIVITY: 8 NG/L — SIGNIFICANT CHANGE UP (ref ?–14)
TROPONIN T, HIGH SENSITIVITY: 8 NG/L — SIGNIFICANT CHANGE UP (ref ?–14)
WBC # BLD: 8.52 K/UL — SIGNIFICANT CHANGE UP (ref 3.8–10.5)
WBC # FLD AUTO: 8.52 K/UL — SIGNIFICANT CHANGE UP (ref 3.8–10.5)

## 2018-07-23 PROCEDURE — 71046 X-RAY EXAM CHEST 2 VIEWS: CPT | Mod: 26

## 2018-07-23 RX ORDER — ASPIRIN/CALCIUM CARB/MAGNESIUM 324 MG
324 TABLET ORAL ONCE
Qty: 0 | Refills: 0 | Status: COMPLETED | OUTPATIENT
Start: 2018-07-23 | End: 2018-07-23

## 2018-07-23 RX ADMIN — Medication 324 MILLIGRAM(S): at 18:13

## 2018-07-23 NOTE — ED PROVIDER NOTE - ATTENDING CONTRIBUTION TO CARE
DR. RODRIGUEZ, ATTENDING MD-  I performed a face to face bedside interview with patient regarding history of present illness, review of symptoms and past medical history. I completed an independent physical exam.  I have discussed patient's plan of care with the resident.   Documentation as above in the note.    Lenny: h/o CAD, presents with 2 weeks of "squeezing" intermittent left CP c/w past chest pains leading to her stents.  Increasing in intensity including at rest.  EKg unchanged from previous.  On exam, well appearing comfortable, lungs clear, heart rrr, abd soft nt.  A/P: chest pain, to be admitted, concern for unsatble angina

## 2018-07-23 NOTE — ED PROVIDER NOTE - MEDICAL DECISION MAKING DETAILS
68f w hx CAD s/p 5 stents on asa/plavix, HTN, HLD, every-day smoker, here c/o 2 wks cp. NAD, nl ekg unchanged from previous. Low susp for ACS given timecourse, however pt w sig risk factors and c/o feeling similar to previous cath. Labs incl CE, cxr, asa, adm

## 2018-07-23 NOTE — ED ADULT NURSE NOTE - CHIEF COMPLAINT QUOTE
Pt with a pmhx of CAD 5 stents presents with c/o twisting left sided chest pain and sob x 2 weeks with a worsening of severity x 1 day. States symptoms are similar to when she required a stent

## 2018-07-23 NOTE — ED ADULT NURSE NOTE - OBJECTIVE STATEMENT
received pt room 16 AOX 4 c/o chest pain, SOB, cough X2 weeks worsening today. Describes CP as heaviness, tightening and twisting, SOB on exertion and rest, arm numbness yesterday. Lung sounds clear, strong peripheral pulses radial/pedal NSR on monitor. Demetris any N/V swelling. Has significant cardiac hx including HTN, CAD 5 stents placed last one April '17, smoke 1 pack/day x45 yr interested in quitting, drinks beer daily. Took all medication this AM

## 2018-07-23 NOTE — ED PROVIDER NOTE - OBJECTIVE STATEMENT
68f w hx CAD s/p 5 stents on asa/plavix, HTN, HLD, every-day smoker, here c/o 2 wks cp. Describes as left-sided, non-radiating, intermittent, twisting in nature. Not associated w exertion, worst when seated. Accompanied by SOB. Said felt left arm numbness at some pt in past 2 wks but resolved on its own quickly. Says feels similar to when needed stents in the past. Currently is cp-free. No nausea or diaphoresis. Cough but no fever, no sore throat/nasal congestion.     cardiologistParveen Mendoza

## 2018-07-24 DIAGNOSIS — I10 ESSENTIAL (PRIMARY) HYPERTENSION: ICD-10-CM

## 2018-07-24 DIAGNOSIS — E78.5 HYPERLIPIDEMIA, UNSPECIFIED: ICD-10-CM

## 2018-07-24 DIAGNOSIS — I24.9 ACUTE ISCHEMIC HEART DISEASE, UNSPECIFIED: ICD-10-CM

## 2018-07-24 DIAGNOSIS — Z72.0 TOBACCO USE: ICD-10-CM

## 2018-07-24 DIAGNOSIS — F32.9 MAJOR DEPRESSIVE DISORDER, SINGLE EPISODE, UNSPECIFIED: ICD-10-CM

## 2018-07-24 DIAGNOSIS — Z29.9 ENCOUNTER FOR PROPHYLACTIC MEASURES, UNSPECIFIED: ICD-10-CM

## 2018-07-24 PROBLEM — R00.1 BRADYCARDIA, UNSPECIFIED: Chronic | Status: ACTIVE | Noted: 2017-04-06

## 2018-07-24 LAB
BUN SERPL-MCNC: 11 MG/DL — SIGNIFICANT CHANGE UP (ref 7–23)
CALCIUM SERPL-MCNC: 8.9 MG/DL — SIGNIFICANT CHANGE UP (ref 8.4–10.5)
CHLORIDE SERPL-SCNC: 97 MMOL/L — LOW (ref 98–107)
CHOLEST SERPL-MCNC: 148 MG/DL — SIGNIFICANT CHANGE UP (ref 120–199)
CO2 SERPL-SCNC: 25 MMOL/L — SIGNIFICANT CHANGE UP (ref 22–31)
CREAT SERPL-MCNC: 0.56 MG/DL — SIGNIFICANT CHANGE UP (ref 0.5–1.3)
GLUCOSE SERPL-MCNC: 90 MG/DL — SIGNIFICANT CHANGE UP (ref 70–99)
HBA1C BLD-MCNC: 5.5 % — SIGNIFICANT CHANGE UP (ref 4–5.6)
HCT VFR BLD CALC: 35.6 % — SIGNIFICANT CHANGE UP (ref 34.5–45)
HDLC SERPL-MCNC: 62 MG/DL — SIGNIFICANT CHANGE UP (ref 45–65)
HGB BLD-MCNC: 12.2 G/DL — SIGNIFICANT CHANGE UP (ref 11.5–15.5)
LIPID PNL WITH DIRECT LDL SERPL: 77 MG/DL — SIGNIFICANT CHANGE UP
MAGNESIUM SERPL-MCNC: 1.9 MG/DL — SIGNIFICANT CHANGE UP (ref 1.6–2.6)
MCHC RBC-ENTMCNC: 33.2 PG — SIGNIFICANT CHANGE UP (ref 27–34)
MCHC RBC-ENTMCNC: 34.3 % — SIGNIFICANT CHANGE UP (ref 32–36)
MCV RBC AUTO: 96.7 FL — SIGNIFICANT CHANGE UP (ref 80–100)
NRBC # FLD: 0 — SIGNIFICANT CHANGE UP
PHOSPHATE SERPL-MCNC: 4.4 MG/DL — SIGNIFICANT CHANGE UP (ref 2.5–4.5)
PLATELET # BLD AUTO: 247 K/UL — SIGNIFICANT CHANGE UP (ref 150–400)
PMV BLD: 11.2 FL — SIGNIFICANT CHANGE UP (ref 7–13)
POTASSIUM SERPL-MCNC: 4.1 MMOL/L — SIGNIFICANT CHANGE UP (ref 3.5–5.3)
POTASSIUM SERPL-SCNC: 4.1 MMOL/L — SIGNIFICANT CHANGE UP (ref 3.5–5.3)
RBC # BLD: 3.68 M/UL — LOW (ref 3.8–5.2)
RBC # FLD: 12.9 % — SIGNIFICANT CHANGE UP (ref 10.3–14.5)
SODIUM SERPL-SCNC: 135 MMOL/L — SIGNIFICANT CHANGE UP (ref 135–145)
TRIGL SERPL-MCNC: 46 MG/DL — SIGNIFICANT CHANGE UP (ref 10–149)
TSH SERPL-MCNC: 1.31 UIU/ML — SIGNIFICANT CHANGE UP (ref 0.27–4.2)
WBC # BLD: 7.62 K/UL — SIGNIFICANT CHANGE UP (ref 3.8–10.5)
WBC # FLD AUTO: 7.62 K/UL — SIGNIFICANT CHANGE UP (ref 3.8–10.5)

## 2018-07-24 PROCEDURE — 93010 ELECTROCARDIOGRAM REPORT: CPT

## 2018-07-24 RX ORDER — HEPARIN SODIUM 5000 [USP'U]/ML
5000 INJECTION INTRAVENOUS; SUBCUTANEOUS EVERY 12 HOURS
Qty: 0 | Refills: 0 | Status: DISCONTINUED | OUTPATIENT
Start: 2018-07-24 | End: 2018-07-25

## 2018-07-24 RX ORDER — VALSARTAN 80 MG/1
320 TABLET ORAL DAILY
Qty: 0 | Refills: 0 | Status: DISCONTINUED | OUTPATIENT
Start: 2018-07-24 | End: 2018-07-25

## 2018-07-24 RX ORDER — AMLODIPINE BESYLATE 2.5 MG/1
10 TABLET ORAL DAILY
Qty: 0 | Refills: 0 | Status: DISCONTINUED | OUTPATIENT
Start: 2018-07-24 | End: 2018-07-25

## 2018-07-24 RX ORDER — CARVEDILOL PHOSPHATE 80 MG/1
6.25 CAPSULE, EXTENDED RELEASE ORAL EVERY 12 HOURS
Qty: 0 | Refills: 0 | Status: DISCONTINUED | OUTPATIENT
Start: 2018-07-24 | End: 2018-07-25

## 2018-07-24 RX ORDER — CITALOPRAM 10 MG/1
20 TABLET, FILM COATED ORAL DAILY
Qty: 0 | Refills: 0 | Status: DISCONTINUED | OUTPATIENT
Start: 2018-07-24 | End: 2018-07-25

## 2018-07-24 RX ORDER — ASPIRIN/CALCIUM CARB/MAGNESIUM 324 MG
81 TABLET ORAL DAILY
Qty: 0 | Refills: 0 | Status: DISCONTINUED | OUTPATIENT
Start: 2018-07-24 | End: 2018-07-25

## 2018-07-24 RX ORDER — CLOPIDOGREL BISULFATE 75 MG/1
75 TABLET, FILM COATED ORAL DAILY
Qty: 0 | Refills: 0 | Status: DISCONTINUED | OUTPATIENT
Start: 2018-07-24 | End: 2018-07-25

## 2018-07-24 RX ORDER — HYDROCHLOROTHIAZIDE 25 MG
25 TABLET ORAL DAILY
Qty: 0 | Refills: 0 | Status: DISCONTINUED | OUTPATIENT
Start: 2018-07-24 | End: 2018-07-25

## 2018-07-24 RX ORDER — NICOTINE POLACRILEX 2 MG
2 GUM BUCCAL
Qty: 0 | Refills: 0 | Status: DISCONTINUED | OUTPATIENT
Start: 2018-07-24 | End: 2018-07-25

## 2018-07-24 RX ORDER — SODIUM CHLORIDE 9 MG/ML
500 INJECTION INTRAMUSCULAR; INTRAVENOUS; SUBCUTANEOUS
Qty: 0 | Refills: 0 | Status: DISCONTINUED | OUTPATIENT
Start: 2018-07-24 | End: 2018-07-24

## 2018-07-24 RX ORDER — ATORVASTATIN CALCIUM 80 MG/1
40 TABLET, FILM COATED ORAL AT BEDTIME
Qty: 0 | Refills: 0 | Status: DISCONTINUED | OUTPATIENT
Start: 2018-07-24 | End: 2018-07-25

## 2018-07-24 RX ADMIN — Medication 25 MILLIGRAM(S): at 05:24

## 2018-07-24 RX ADMIN — SODIUM CHLORIDE 75 MILLILITER(S): 9 INJECTION INTRAMUSCULAR; INTRAVENOUS; SUBCUTANEOUS at 15:22

## 2018-07-24 RX ADMIN — CITALOPRAM 20 MILLIGRAM(S): 10 TABLET, FILM COATED ORAL at 11:56

## 2018-07-24 RX ADMIN — ATORVASTATIN CALCIUM 40 MILLIGRAM(S): 80 TABLET, FILM COATED ORAL at 21:33

## 2018-07-24 RX ADMIN — Medication 2 MILLIGRAM(S): at 21:33

## 2018-07-24 RX ADMIN — AMLODIPINE BESYLATE 10 MILLIGRAM(S): 2.5 TABLET ORAL at 05:24

## 2018-07-24 RX ADMIN — CARVEDILOL PHOSPHATE 6.25 MILLIGRAM(S): 80 CAPSULE, EXTENDED RELEASE ORAL at 11:55

## 2018-07-24 RX ADMIN — CARVEDILOL PHOSPHATE 6.25 MILLIGRAM(S): 80 CAPSULE, EXTENDED RELEASE ORAL at 21:33

## 2018-07-24 RX ADMIN — CLOPIDOGREL BISULFATE 75 MILLIGRAM(S): 75 TABLET, FILM COATED ORAL at 11:56

## 2018-07-24 RX ADMIN — Medication 81 MILLIGRAM(S): at 11:55

## 2018-07-24 RX ADMIN — HEPARIN SODIUM 5000 UNIT(S): 5000 INJECTION INTRAVENOUS; SUBCUTANEOUS at 05:24

## 2018-07-24 NOTE — H&P ADULT - ATTENDING COMMENTS
Patient seen and examined.  Agree with above.   -Admitted with exertional chest pain and dyspnea, typical of her angina.  -Symptoms consisted with unstable angina  -will therefore perform cath today    Payam Urbano MD

## 2018-07-24 NOTE — H&P ADULT - PMH
Bradycardia    CAD (coronary artery disease)  5 stents  Depression    GERD (gastroesophageal reflux disease)    Hyperlipidemia    Hypertension    Reflux gastritis    Smoker

## 2018-07-24 NOTE — H&P ADULT - NEGATIVE MUSCULOSKELETAL SYMPTOMS
no arthritis/no muscle cramps/no arthralgia/no joint swelling/no muscle weakness/no myalgia/no stiffness/no neck pain

## 2018-07-24 NOTE — H&P ADULT - NEGATIVE NEUROLOGICAL SYMPTOMS
no transient paralysis/no syncope/no tremors/no headache/no difficulty walking/no hemiparesis/no vertigo/no loss of consciousness/no weakness/no generalized seizures/no loss of sensation/no focal seizures/no confusion/no paresthesias

## 2018-07-24 NOTE — H&P ADULT - FAMILY HISTORY
Family history of Alzheimer's disease, Father     Sibling  Still living? Yes, Estimated age: Age Unknown  Family history of heart disease, Age at diagnosis: Age Unknown     Grandparent  Still living? No  Family history of myocardial infarction, Age at diagnosis: Age Unknown  Family history of breast cancer, Age at diagnosis: Age Unknown

## 2018-07-24 NOTE — H&P ADULT - GASTROINTESTINAL DETAILS
normal/soft/nontender/bowel sounds normal/no distention/no rebound tenderness/no rigidity/no guarding

## 2018-07-24 NOTE — H&P ADULT - NSHPLABSRESULTS_GEN_ALL_CORE
12.6   8.52  )-----------( 264      ( 23 Jul 2018 17:45 )             36.9     07-23    133<L>  |  96<L>  |  10  ----------------------------<  85  3.9   |  24  |  0.61    Ca    8.9      23 Jul 2018 17:45    TPro  7.3  /  Alb  4.2  /  TBili  0.5  /  DBili  x   /  AST  23  /  ALT  17  /  AlkPhos  90  07-23    Prelim CXR:  No urgent/emergent findings. Follow up official report.    EKG: NSR at a rate of 60 with QTc of 456

## 2018-07-24 NOTE — H&P ADULT - NEGATIVE ENMT SYMPTOMS
no tinnitus/no hearing difficulty/no ear pain/no vertigo/no nasal discharge/no sinus symptoms/no nasal congestion

## 2018-07-24 NOTE — H&P ADULT - NSHPSOCIALHISTORY_GEN_ALL_CORE
, lives with , [employer] worker  Smokes every day and smoked today(1/2 a pack), drinks two times a week and last drink was today 2 beer, denied any illicit drugs use

## 2018-07-24 NOTE — H&P ADULT - PSH
Carpal tunnel syndrome, bilateral  b/l wrist surgery for carpal tunnel syndrome   delivery delivered  x2  S/P foot surgery, right  Bunionectomy and hammer toed 2016  Stented coronary artery  x 5

## 2018-07-24 NOTE — ED ADULT NURSE REASSESSMENT NOTE - NS ED NURSE REASSESS COMMENT FT1
Pt aox4 in no acute distress on cardiac monitor, vss. Respirations even and unlabored. Denies chest pain at this time. Denies headache. Resting comfortable.  Will continue to monitor/assess
pt aox4; in no acute distress. Cardiac monitor in place. Denies chest pain at this time. Respirations even/unlabored. Will continue to monitor/assess
Pt remaining febrile after IV tylenol. Pt BP dropping to 92/40 MD notified NS bolus running moved pt back into room 17 awaiting SICU consult will continue to monitor

## 2018-07-24 NOTE — H&P ADULT - NEGATIVE GASTROINTESTINAL SYMPTOMS
no abdominal pain/no hematochezia/no vomiting/no change in bowel habits/no diarrhea/no melena/no nausea/no constipation

## 2018-07-24 NOTE — H&P ADULT - ASSESSMENT
69 y/o F with PMH of CAD(s/p 5 stents), HTN, HLD, chronic smoker presented with the complaint of left sided chest pain. R/o ACS

## 2018-07-24 NOTE — H&P ADULT - HISTORY OF PRESENT ILLNESS
69 y/o F with PMH of CAD(s/p 5 stents), HTN, HLD, chronic smoker presented with the complaint of left sided chest pain 69 y/o F with PMH of CAD(s/p 5 stents), HTN, HLD, chronic smoker presented with the complaint of left sided chest pain. As per the patient she developed the chest pain about 2 weeks ago and has been intermittent. Patient stated that the chest pain is located in the left side, characterized as a tightness sensation or a twisting sensation, lasting about 1-2 minutes in duration, without any aggravating factors, self alleviating, without any radiation of the pain, with a severity of 7/10. Patient stated that this episode of chest pain is similar to when she had the stents placed. Patient stated that with the chest pain she also had SOB and was very winded even walking 1 block with her grandson. Patient denied any fevers, chills, N/V/D/C, abdominal pain, dysuria, melena, hematochezia, recent travel, sick contact, pleuritic or positional chest pain.     On ED admission EKG revealed NSR at a rate of 60 with QTc of 456, CE x 1: Trop: 8->8, Na: 133. Prelim CXR:  No urgent/emergent findings. Follow up official report. Patient received 1x dose of aspirin 324mg in the ED.

## 2018-07-24 NOTE — CONSULT NOTE ADULT - ASSESSMENT
69 y/o F with PMH of CAD(s/p 5 stents), HTN, HLD, chronic smoker presented with the complaint of left sided chest pain. As per the patient she developed the chest pain about 2 weeks ago and has been intermittent. Patient stated that the chest pain is located in the left side, characterized as a tightness sensation or a twisting sensation, lasting about 1-2 minutes in duration, without any aggravating factors, self alleviating, without any radiation of the pain, with a severity of 7/10. Patient stated that this episode of chest pain is similar to when she had the stents placed. Patient stated that with the chest pain she also had SOB and was very winded even walking 1 block with her grandson. Patient denied any fevers, chills, N/V/D/C, abdominal pain, dysuria, melena, hematochezia, recent travel, sick contact, pleuritic or positional chest pain. S/PCath with TG .       Problem/Plan - 1:  ·  Problem: Chest Pain with CAD .  Plan: S/P Cardiac cath with TG LCX .  TTE ordered   Continue with Aspirin 81mg and Plavix 75mg daily.      Problem/Plan - 2:  ·  Problem: Hyperlipidemia.  Plan: Continue with Lipitor daily   Lipid profile in am, low cholesterol diet recommended.      Problem/Plan - 3:  ·  Problem: Essential hypertension.  Plan: BP readings fine.  Continue with antihypertensive medications   DASH diet recommended.      Problem/Plan - 4:  ·  Problem: Depression.  Plan: Continue with Celexa daily.      Problem/Plan - 5:  ·  Problem: Smoking trying to quit.  Plan: Said will quit now.   Nicotine gum ordered.      Problem/Plan - 6:  Problem: Need for prophylactic measure. Plan: On heparin sq 5000U q12hrs.

## 2018-07-24 NOTE — H&P ADULT - RS GEN PE MLT RESP DETAILS PC
respirations non-labored/diminished breath sounds, R/no rhonchi/no chest wall tenderness/no rales/no wheezes/diminished breath sounds, L/airway patent/no intercostal retractions/normal

## 2018-07-24 NOTE — H&P ADULT - NEGATIVE OPHTHALMOLOGIC SYMPTOMS
no discharge R/no blurred vision L/no blurred vision R/no discharge L/no photophobia/no lacrimation L/no lacrimation R/no diplopia

## 2018-07-25 ENCOUNTER — TRANSCRIPTION ENCOUNTER (OUTPATIENT)
Age: 68
End: 2018-07-25

## 2018-07-25 VITALS
DIASTOLIC BLOOD PRESSURE: 68 MMHG | TEMPERATURE: 98 F | OXYGEN SATURATION: 100 % | SYSTOLIC BLOOD PRESSURE: 150 MMHG | HEART RATE: 63 BPM | RESPIRATION RATE: 18 BRPM

## 2018-07-25 LAB
BUN SERPL-MCNC: 13 MG/DL — SIGNIFICANT CHANGE UP (ref 7–23)
CALCIUM SERPL-MCNC: 9.5 MG/DL — SIGNIFICANT CHANGE UP (ref 8.4–10.5)
CHLORIDE SERPL-SCNC: 100 MMOL/L — SIGNIFICANT CHANGE UP (ref 98–107)
CO2 SERPL-SCNC: 30 MMOL/L — SIGNIFICANT CHANGE UP (ref 22–31)
CREAT SERPL-MCNC: 0.64 MG/DL — SIGNIFICANT CHANGE UP (ref 0.5–1.3)
GLUCOSE SERPL-MCNC: 100 MG/DL — HIGH (ref 70–99)
HCT VFR BLD CALC: 40.5 % — SIGNIFICANT CHANGE UP (ref 34.5–45)
HGB BLD-MCNC: 13.7 G/DL — SIGNIFICANT CHANGE UP (ref 11.5–15.5)
MAGNESIUM SERPL-MCNC: 2.1 MG/DL — SIGNIFICANT CHANGE UP (ref 1.6–2.6)
MCHC RBC-ENTMCNC: 32.9 PG — SIGNIFICANT CHANGE UP (ref 27–34)
MCHC RBC-ENTMCNC: 33.8 % — SIGNIFICANT CHANGE UP (ref 32–36)
MCV RBC AUTO: 97.1 FL — SIGNIFICANT CHANGE UP (ref 80–100)
NRBC # FLD: 0 — SIGNIFICANT CHANGE UP
PLATELET # BLD AUTO: 266 K/UL — SIGNIFICANT CHANGE UP (ref 150–400)
PMV BLD: 11.5 FL — SIGNIFICANT CHANGE UP (ref 7–13)
POTASSIUM SERPL-MCNC: 4.6 MMOL/L — SIGNIFICANT CHANGE UP (ref 3.5–5.3)
POTASSIUM SERPL-SCNC: 4.6 MMOL/L — SIGNIFICANT CHANGE UP (ref 3.5–5.3)
RBC # BLD: 4.17 M/UL — SIGNIFICANT CHANGE UP (ref 3.8–5.2)
RBC # FLD: 12.9 % — SIGNIFICANT CHANGE UP (ref 10.3–14.5)
SODIUM SERPL-SCNC: 139 MMOL/L — SIGNIFICANT CHANGE UP (ref 135–145)
WBC # BLD: 8.26 K/UL — SIGNIFICANT CHANGE UP (ref 3.8–10.5)
WBC # FLD AUTO: 8.26 K/UL — SIGNIFICANT CHANGE UP (ref 3.8–10.5)

## 2018-07-25 PROCEDURE — 93306 TTE W/DOPPLER COMPLETE: CPT | Mod: 26

## 2018-07-25 RX ORDER — ASPIRIN/CALCIUM CARB/MAGNESIUM 324 MG
1 TABLET ORAL
Qty: 0 | Refills: 0 | DISCHARGE
Start: 2018-07-25

## 2018-07-25 RX ORDER — CITALOPRAM 10 MG/1
1 TABLET, FILM COATED ORAL
Qty: 0 | Refills: 0 | DISCHARGE
Start: 2018-07-25

## 2018-07-25 RX ORDER — CARVEDILOL PHOSPHATE 80 MG/1
1 CAPSULE, EXTENDED RELEASE ORAL
Qty: 0 | Refills: 0 | DISCHARGE
Start: 2018-07-25

## 2018-07-25 RX ORDER — AMLODIPINE BESYLATE 2.5 MG/1
1 TABLET ORAL
Qty: 0 | Refills: 0 | COMMUNITY
Start: 2018-07-25

## 2018-07-25 RX ORDER — ATORVASTATIN CALCIUM 80 MG/1
1 TABLET, FILM COATED ORAL
Qty: 0 | Refills: 0 | DISCHARGE
Start: 2018-07-25

## 2018-07-25 RX ORDER — CLOPIDOGREL BISULFATE 75 MG/1
1 TABLET, FILM COATED ORAL
Qty: 0 | Refills: 0 | DISCHARGE
Start: 2018-07-25

## 2018-07-25 RX ADMIN — VALSARTAN 320 MILLIGRAM(S): 80 TABLET ORAL at 04:35

## 2018-07-25 RX ADMIN — CITALOPRAM 20 MILLIGRAM(S): 10 TABLET, FILM COATED ORAL at 13:08

## 2018-07-25 RX ADMIN — CLOPIDOGREL BISULFATE 75 MILLIGRAM(S): 75 TABLET, FILM COATED ORAL at 13:08

## 2018-07-25 RX ADMIN — Medication 25 MILLIGRAM(S): at 04:35

## 2018-07-25 RX ADMIN — AMLODIPINE BESYLATE 10 MILLIGRAM(S): 2.5 TABLET ORAL at 04:35

## 2018-07-25 RX ADMIN — Medication 81 MILLIGRAM(S): at 13:08

## 2018-07-25 NOTE — DISCHARGE NOTE ADULT - MEDICATION SUMMARY - MEDICATIONS TO TAKE
I will START or STAY ON the medications listed below when I get home from the hospital:    aspirin 81 mg oral delayed release tablet  -- 1 tab(s) by mouth once a day  -- Indication: For CAD (coronary artery disease)    citalopram 20 mg oral tablet  -- 1 tab(s) by mouth once a day  -- Indication: For Depression    atorvastatin 40 mg oral tablet  -- 1 tab(s) by mouth once a day (at bedtime)  -- Indication: For CAD (coronary artery disease)    Diovan  mg-25 mg oral tablet  -- 1 tab(s) by mouth once a day  -- Indication: For Essential hypertension    clopidogrel 75 mg oral tablet  -- 1 tab(s) by mouth once a day  -- Indication: For CAD (coronary artery disease)    carvedilol 6.25 mg oral tablet  -- 1 tab(s) by mouth every 12 hours  -- Indication: For CAD (coronary artery disease)    amLODIPine 10 mg oral tablet  -- 1 tab(s) by mouth once a day  -- Indication: For Hypertension

## 2018-07-25 NOTE — DISCHARGE NOTE ADULT - PLAN OF CARE
To be asymptomatic, to reduce risks factors such as hypertension, diabetes and hyperlipidemia to lower the risk of blood clots formation; and to prevent complications of coronary artery disease such as worsening chest pain, heart attack and death. Continue aspirin and Plavix, do not stop unless instructed by your physician.  Continue low salt, fat, cholesterol and carbohydrate diet. Follow up with cardiologist and primary care physician's routine appointment.   NO REPETITIVE USE OF RIGHT HAND X 24 HOURS. NO DRIVING FOR 24 HOURS. NO HEAVY LIFTING GREATER THAN 5-10 LBS FOR 1 WEEK. NO STRENUOUS ACTIVITY FOR 3 WEEKS. PLEASE NOTIFY MD IF ANY BLEEDING , REDNESS , SWELLING, PAIN , OR DISCHARGE FROM PROCEDURE SITE To maintain a normal blood pressure to prevent heart attack, stroke and renal failure. Low sodium and fat diet, continue anti-hypertensive medications, and follow up with primary care physician. continue medication To maintain normal cholesterol levels to prevent stroke, coronary artery disease, peripheral vascular disease and heart attacks. Low fat diet, exercise daily and continue current medications. Follow up with primary care physician and cardiologist for management. To prevent acid reflux, heartburn and chest pain. Avoid fatty, fried foods, acidic foods such as tomatoes, lime and chocolate. Continue to take your medications as prescribed, exercise daily and weight loss.

## 2018-07-25 NOTE — PROGRESS NOTE ADULT - ATTENDING COMMENTS
Patient seen and examined.  Agree with above.   -s/p leo to proximal cx  -continue with dapt  -cr stable  -right wrist: no hematoma and 2+ pulses  -dc home    Payam Urbano MD

## 2018-07-25 NOTE — PROGRESS NOTE ADULT - ASSESSMENT
69 y/o F with PMH of CAD(s/p 5 stents), HTN, HLD, chronic smoker presented with the complaint of left sided chest pain. As per the patient she developed the chest pain about 2 weeks ago and has been intermittent. Patient stated that the chest pain is located in the left side, characterized as a tightness sensation or a twisting sensation, lasting about 1-2 minutes in duration, without any aggravating factors, self alleviating, without any radiation of the pain, with a severity of 7/10. Patient stated that this episode of chest pain is similar to when she had the stents placed. Patient stated that with the chest pain she also had SOB and was very winded even walking 1 block with her grandson. Patient denied any fevers, chills, N/V/D/C, abdominal pain, dysuria, melena, hematochezia, recent travel, sick contact, pleuritic or positional chest pain. S/PCath with TG .       Problem/Plan - 1:  ·  Problem: Chest Pain with CAD .  Plan: S/P Cardiac cath with TG LCX .NO CP or SOB.   TTE results pending.    Continue with Aspirin 81mg and Plavix 75mg daily.      Problem/Plan - 2:  ·  Problem: Hyperlipidemia.  Plan: Continue with Lipitor daily   Lipid profile in am, low cholesterol diet recommended.      Problem/Plan - 3:  ·  Problem: Essential hypertension.  Plan: BP readings fine.  Continue with antihypertensive medications   DASH diet recommended.      Problem/Plan - 4:  ·  Problem: Depression.  Plan: Continue with Celexa daily.      Problem/Plan - 5:  ·  Problem: Smoker.  Plan: Quitting now.   Nicotine gum ordered.       Problem/Plan - 6:  Problem: Need for prophylactic measure. Plan: On heparin sq 5000U q12hrs.

## 2018-07-25 NOTE — DISCHARGE NOTE ADULT - HOSPITAL COURSE
69 y/o F with PMH of CAD(s/p 5 stents), HTN, HLD, chronic smoker presented with the complaint of left sided chest pain. Admitted to telemetry for r/o ACS   On 7/24/18 Cardiac cath - EF 60%, LCx 905=> stent x 1, RCA 40%. RRA access. ASA + Plavix  Tele - Shmuel @ 37 bpm asymp sleeping VSS 67 y/o F with PMH of CAD(s/p 5 stents), HTN, HLD, chronic smoker presented with the complaint of left sided chest pain. Admitted to telemetry for r/o ACS   On 7/24/18 Cardiac cath - EF 60%, LCx 905=> stent x 1, RCA 40%. RRA access. ASA + Plavix  Tele - Shmuel @ 37 bpm asymp sleeping VSS   discussed with attending patient stable for discharge home today . follow up with Cardiology in 1 week

## 2018-07-25 NOTE — PROGRESS NOTE ADULT - SUBJECTIVE AND OBJECTIVE BOX
INTERVAL HPI/OVERNIGHT EVENTS: I feel fine and had echo.   Vital Signs Last 24 Hrs  T(C): 36.6 (25 Jul 2018 04:34), Max: 36.7 (24 Jul 2018 18:59)  T(F): 97.8 (25 Jul 2018 04:34), Max: 98 (24 Jul 2018 18:59)  HR: 56 (25 Jul 2018 09:24) (50 - 63)  BP: 128/67 (25 Jul 2018 09:24) (121/64 - 148/88)  BP(mean): --  RR: 19 (25 Jul 2018 04:34) (14 - 19)  SpO2: 99% (25 Jul 2018 04:34) (95% - 99%)  I&O's Summary    24 Jul 2018 07:01  -  25 Jul 2018 07:00  --------------------------------------------------------  IN: 634 mL / OUT: 0 mL / NET: 634 mL      MEDICATIONS  (STANDING):  amLODIPine   Tablet 10 milliGRAM(s) Oral daily  aspirin enteric coated 81 milliGRAM(s) Oral daily  atorvastatin 40 milliGRAM(s) Oral at bedtime  carvedilol 6.25 milliGRAM(s) Oral every 12 hours  citalopram 20 milliGRAM(s) Oral daily  clopidogrel Tablet 75 milliGRAM(s) Oral daily  heparin  Injectable 5000 Unit(s) SubCutaneous every 12 hours  hydrochlorothiazide 25 milliGRAM(s) Oral daily  valsartan 320 milliGRAM(s) Oral daily    MEDICATIONS  (PRN):  nicotine  Polacrilex Gum 2 milliGRAM(s) Oral every 2 hours PRN breakthrough cravings    LABS:                        13.7   8.26  )-----------( 266      ( 25 Jul 2018 06:47 )             40.5     07-25    139  |  100  |  13  ----------------------------<  100<H>  4.6   |  30  |  0.64    Ca    9.5      25 Jul 2018 06:47  Phos  4.4     07-24  Mg     2.1     07-25    TPro  7.3  /  Alb  4.2  /  TBili  0.5  /  DBili  x   /  AST  23  /  ALT  17  /  AlkPhos  90  07-23    PT/INR - ( 23 Jul 2018 17:45 )   PT: 11.3 SEC;   INR: 0.98              CAPILLARY BLOOD GLUCOSE              REVIEW OF SYSTEMS:  CONSTITUTIONAL: No fever, weight loss, or fatigue  EYES: No eye pain, visual disturbances, or discharge  ENMT:  No difficulty hearing, tinnitus, vertigo; No sinus or throat pain  NECK: No pain or stiffness  BREASTS: No pain, masses, or nipple discharge  RESPIRATORY: No cough, wheezing, chills or hemoptysis; No shortness of breath  CARDIOVASCULAR: No chest pain, palpitations, dizziness, or leg swelling  GASTROINTESTINAL: No abdominal or epigastric pain. No nausea, vomiting, or hematemesis; No diarrhea or constipation. No melena or hematochezia.  GENITOURINARY: No dysuria, frequency, hematuria, or incontinence  NEUROLOGICAL: No headaches, memory loss, loss of strength, numbness, or tremors  SKIN: No itching, burning, rashes, or lesions   LYMPH NODES: No enlarged glands  ENDOCRINE: No heat or cold intolerance; No hair loss      Consultant(s) Notes Reviewed:  [x ] YES  [ ] NO    PHYSICAL EXAM:  GENERAL: NAD, well-groomed, well-developed,not in any distress ,  HEAD:  Atraumatic, Normocephalic  EYES: EOMI, PERRLA, conjunctiva and sclera clear  ENMT: No tonsillar erythema, exudates, or enlargement; Moist mucous membranes, Good dentition, No lesions  NECK: Supple, No JVD, Normal thyroid  NERVOUS SYSTEM:  Alert & Oriented X3, No focal deficit   CHEST/LUNG: Good air entry bilateral with no  rales, rhonchi, wheezing, or rubs  HEART: Regular rate and rhythm; No murmurs, rubs, or gallops  ABDOMEN: Soft, Nontender, Nondistended; Bowel sounds present  EXTREMITIES:  2+ Peripheral Pulses, No clubbing, cyanosis, or edema  rt wrist dressed     Care Discussed with Consultants/Other Providers [ x] YES  [ ] NO

## 2018-07-25 NOTE — PROGRESS NOTE ADULT - SUBJECTIVE AND OBJECTIVE BOX
SUBJECTIVE: no CP or SOB    MEDICATIONS  (STANDING):  amLODIPine   Tablet 10 milliGRAM(s) Oral daily  aspirin enteric coated 81 milliGRAM(s) Oral daily  atorvastatin 40 milliGRAM(s) Oral at bedtime  carvedilol 6.25 milliGRAM(s) Oral every 12 hours  citalopram 20 milliGRAM(s) Oral daily  clopidogrel Tablet 75 milliGRAM(s) Oral daily  heparin  Injectable 5000 Unit(s) SubCutaneous every 12 hours  hydrochlorothiazide 25 milliGRAM(s) Oral daily  valsartan 320 milliGRAM(s) Oral daily    LABS:                        13.7   8.26  )-----------( 266      ( 25 Jul 2018 06:47 )             40.5     139  |  100  |  13  ----------------------------<  100<H>  4.6   |  30  |  0.64    Ca    9.5      25 Jul 2018 06:47  Phos  4.4     07-24  Mg     2.1     07-25    TPro  7.3  /  Alb  4.2  /  TBili  0.5  /  DBili  x   /  AST  23  /  ALT  17  /  AlkPhos  90  07-23    PHYSICAL EXAM:  Vital Signs Last 24 Hrs  T(C): 36.6 (25 Jul 2018 04:34), Max: 36.7 (24 Jul 2018 18:59)  T(F): 97.8 (25 Jul 2018 04:34), Max: 98 (24 Jul 2018 18:59)  HR: 56 (25 Jul 2018 09:24) (50 - 63)  BP: 128/67 (25 Jul 2018 09:24) (121/64 - 148/88)  RR: 19 (25 Jul 2018 04:34) (14 - 19)  SpO2: 99% (25 Jul 2018 04:34) (95% - 99%)    Cardiovascular:  S1S2 RRR, No JVD  Respiratory: Lungs clear to auscultation, normal effort  Gastrointestinal: Abdomen soft, ND, NT, +BS  Skin: Warm, dry, intact. No rash.  Musculoskeletal: Normal ROM, normal strength  Ext: No C/C/E B/L LE    DIAGNOSTIC DATA  TELEMETRY: NSR      ASSESSMENT AND PLAN:    69 y/o F with PMH of CAD(s/p 5 stents), HTN, HLD, chronic smoker presented with chest pain consistent with unstable angina    --s/p [facility] with EF 60% and 90% Lcx s/p TG  --F/U Dr Mendoza on 7/30  --smoking cessation counseled  --refrain from returning to work for 1 week  --DC home today    Elaine Evangelista PA-C

## 2018-07-25 NOTE — DISCHARGE NOTE ADULT - CARE PLAN
Principal Discharge DX:	CAD (coronary artery disease)  Goal:	To be asymptomatic, to reduce risks factors such as hypertension, diabetes and hyperlipidemia to lower the risk of blood clots formation; and to prevent complications of coronary artery disease such as worsening chest pain, heart attack and death.  Assessment and plan of treatment:	Continue aspirin and Plavix, do not stop unless instructed by your physician.  Continue low salt, fat, cholesterol and carbohydrate diet. Follow up with cardiologist and primary care physician's routine appointment.   NO REPETITIVE USE OF RIGHT HAND X 24 HOURS. NO DRIVING FOR 24 HOURS. NO HEAVY LIFTING GREATER THAN 5-10 LBS FOR 1 WEEK. NO STRENUOUS ACTIVITY FOR 3 WEEKS. PLEASE NOTIFY MD IF ANY BLEEDING , REDNESS , SWELLING, PAIN , OR DISCHARGE FROM PROCEDURE SITE  Secondary Diagnosis:	Essential hypertension  Goal:	To maintain a normal blood pressure to prevent heart attack, stroke and renal failure.  Assessment and plan of treatment:	Low sodium and fat diet, continue anti-hypertensive medications, and follow up with primary care physician.  Secondary Diagnosis:	Depression  Assessment and plan of treatment:	continue medication  Secondary Diagnosis:	Hyperlipidemia  Goal:	To maintain normal cholesterol levels to prevent stroke, coronary artery disease, peripheral vascular disease and heart attacks.  Assessment and plan of treatment:	Low fat diet, exercise daily and continue current medications. Follow up with primary care physician and cardiologist for management.  Secondary Diagnosis:	GERD (gastroesophageal reflux disease)  Goal:	To prevent acid reflux, heartburn and chest pain.  Assessment and plan of treatment:	Avoid fatty, fried foods, acidic foods such as tomatoes, lime and chocolate. Continue to take your medications as prescribed, exercise daily and weight loss.

## 2018-07-25 NOTE — DISCHARGE NOTE ADULT - CARE PROVIDER_API CALL
Becca Pressley), Cardiovascular Disease; Internal Medicine  [address] [facility] [address] [place] [address]  Phone: [phone]  Fax: [phone]

## 2018-07-25 NOTE — DISCHARGE NOTE ADULT - PATIENT PORTAL LINK FT
You can access the [facility] Patient Portal, offered by [facility], by registering with the following website: [email or web address] [email or web address]

## 2018-09-04 ENCOUNTER — EMERGENCY (EMERGENCY)
Facility: HOSPITAL | Age: 68
LOS: 1 days | Discharge: HOME | End: 2018-09-04
Attending: EMERGENCY MEDICINE | Admitting: EMERGENCY MEDICINE
Payer: COMMERCIAL

## 2018-09-04 VITALS
TEMPERATURE: 98 F | HEART RATE: 62 BPM | SYSTOLIC BLOOD PRESSURE: 135 MMHG | RESPIRATION RATE: 16 BRPM | DIASTOLIC BLOOD PRESSURE: 53 MMHG | OXYGEN SATURATION: 98 %

## 2018-09-04 DIAGNOSIS — G56.01 CARPAL TUNNEL SYNDROME, RIGHT UPPER LIMB: Chronic | ICD-10-CM

## 2018-09-04 DIAGNOSIS — Z95.5 PRESENCE OF CORONARY ANGIOPLASTY IMPLANT AND GRAFT: Chronic | ICD-10-CM

## 2018-09-04 DIAGNOSIS — Z98.890 OTHER SPECIFIED POSTPROCEDURAL STATES: Chronic | ICD-10-CM

## 2018-09-04 LAB
ALBUMIN SERPL ELPH-MCNC: 4 G/DL — SIGNIFICANT CHANGE UP (ref 3.3–5)
ALP SERPL-CCNC: 74 U/L — SIGNIFICANT CHANGE UP (ref 40–120)
ALT FLD-CCNC: 16 U/L — SIGNIFICANT CHANGE UP (ref 4–33)
AST SERPL-CCNC: 19 U/L — SIGNIFICANT CHANGE UP (ref 4–32)
BASOPHILS # BLD AUTO: 0.04 K/UL — SIGNIFICANT CHANGE UP (ref 0–0.2)
BASOPHILS NFR BLD AUTO: 0.5 % — SIGNIFICANT CHANGE UP (ref 0–2)
BILIRUB SERPL-MCNC: 0.3 MG/DL — SIGNIFICANT CHANGE UP (ref 0.2–1.2)
BUN SERPL-MCNC: 13 MG/DL — SIGNIFICANT CHANGE UP (ref 7–23)
CALCIUM SERPL-MCNC: 9.2 MG/DL — SIGNIFICANT CHANGE UP (ref 8.4–10.5)
CHLORIDE SERPL-SCNC: 100 MMOL/L — SIGNIFICANT CHANGE UP (ref 98–107)
CO2 SERPL-SCNC: 26 MMOL/L — SIGNIFICANT CHANGE UP (ref 22–31)
CREAT SERPL-MCNC: 0.58 MG/DL — SIGNIFICANT CHANGE UP (ref 0.5–1.3)
CRP SERPL-MCNC: < 4 MG/L — SIGNIFICANT CHANGE UP
EOSINOPHIL # BLD AUTO: 0.38 K/UL — SIGNIFICANT CHANGE UP (ref 0–0.5)
EOSINOPHIL NFR BLD AUTO: 4.8 % — SIGNIFICANT CHANGE UP (ref 0–6)
ERYTHROCYTE [SEDIMENTATION RATE] IN BLOOD: 17 MM/HR — SIGNIFICANT CHANGE UP (ref 4–25)
GLUCOSE SERPL-MCNC: 97 MG/DL — SIGNIFICANT CHANGE UP (ref 70–99)
HCT VFR BLD CALC: 38.5 % — SIGNIFICANT CHANGE UP (ref 34.5–45)
HGB BLD-MCNC: 13 G/DL — SIGNIFICANT CHANGE UP (ref 11.5–15.5)
IMM GRANULOCYTES # BLD AUTO: 0.02 # — SIGNIFICANT CHANGE UP
IMM GRANULOCYTES NFR BLD AUTO: 0.3 % — SIGNIFICANT CHANGE UP (ref 0–1.5)
LYMPHOCYTES # BLD AUTO: 2.58 K/UL — SIGNIFICANT CHANGE UP (ref 1–3.3)
LYMPHOCYTES # BLD AUTO: 32.5 % — SIGNIFICANT CHANGE UP (ref 13–44)
MCHC RBC-ENTMCNC: 33.8 % — SIGNIFICANT CHANGE UP (ref 32–36)
MCHC RBC-ENTMCNC: 33.9 PG — SIGNIFICANT CHANGE UP (ref 27–34)
MCV RBC AUTO: 100.3 FL — HIGH (ref 80–100)
MONOCYTES # BLD AUTO: 0.72 K/UL — SIGNIFICANT CHANGE UP (ref 0–0.9)
MONOCYTES NFR BLD AUTO: 9.1 % — SIGNIFICANT CHANGE UP (ref 2–14)
NEUTROPHILS # BLD AUTO: 4.19 K/UL — SIGNIFICANT CHANGE UP (ref 1.8–7.4)
NEUTROPHILS NFR BLD AUTO: 52.8 % — SIGNIFICANT CHANGE UP (ref 43–77)
NRBC # FLD: 0 — SIGNIFICANT CHANGE UP
PLATELET # BLD AUTO: 282 K/UL — SIGNIFICANT CHANGE UP (ref 150–400)
PMV BLD: 11.5 FL — SIGNIFICANT CHANGE UP (ref 7–13)
POTASSIUM SERPL-MCNC: 3.6 MMOL/L — SIGNIFICANT CHANGE UP (ref 3.5–5.3)
POTASSIUM SERPL-SCNC: 3.6 MMOL/L — SIGNIFICANT CHANGE UP (ref 3.5–5.3)
PROT SERPL-MCNC: 7.4 G/DL — SIGNIFICANT CHANGE UP (ref 6–8.3)
RBC # BLD: 3.84 M/UL — SIGNIFICANT CHANGE UP (ref 3.8–5.2)
RBC # FLD: 12.5 % — SIGNIFICANT CHANGE UP (ref 10.3–14.5)
SODIUM SERPL-SCNC: 137 MMOL/L — SIGNIFICANT CHANGE UP (ref 135–145)
WBC # BLD: 7.93 K/UL — SIGNIFICANT CHANGE UP (ref 3.8–10.5)
WBC # FLD AUTO: 7.93 K/UL — SIGNIFICANT CHANGE UP (ref 3.8–10.5)

## 2018-09-04 PROCEDURE — 73630 X-RAY EXAM OF FOOT: CPT | Mod: 26,RT

## 2018-09-04 PROCEDURE — 99284 EMERGENCY DEPT VISIT MOD MDM: CPT

## 2018-09-04 RX ORDER — KETOROLAC TROMETHAMINE 30 MG/ML
15 SYRINGE (ML) INJECTION ONCE
Qty: 0 | Refills: 0 | Status: DISCONTINUED | OUTPATIENT
Start: 2018-09-04 | End: 2018-09-04

## 2018-09-04 RX ORDER — SODIUM CHLORIDE 9 MG/ML
1000 INJECTION INTRAMUSCULAR; INTRAVENOUS; SUBCUTANEOUS ONCE
Qty: 0 | Refills: 0 | Status: COMPLETED | OUTPATIENT
Start: 2018-09-04 | End: 2018-09-04

## 2018-09-04 RX ADMIN — SODIUM CHLORIDE 1000 MILLILITER(S): 9 INJECTION INTRAMUSCULAR; INTRAVENOUS; SUBCUTANEOUS at 12:47

## 2018-09-04 RX ADMIN — Medication 15 MILLIGRAM(S): at 12:47

## 2018-09-04 NOTE — ED PROVIDER NOTE - LOWER EXTREMITY EXAM, RIGHT
defomrity with scond toe hyperextnsion, 3rd toe is swollen and warm but no rednes , mild fluctuance at toe pulp, decreased intertoe space

## 2018-09-04 NOTE — ED PROVIDER NOTE - OBJECTIVE STATEMENT
69 y/o F with h/o htn, hld, cad with stents, active smoker , rt hammer toes p/w rt third toe pain and swelling for few days which is getting worse. Pt also states that her rt second toe has been hyperextended since she had surgery in april and she has been unable to wear closed toe shoe as she is in pain when she does that. Pt works at [employer] and surgery was done in [place]

## 2018-09-04 NOTE — ED ADULT TRIAGE NOTE - CHIEF COMPLAINT QUOTE
Pt states that she had surgery on right foot in April for bunion removal and hammer toe, states that she has had an infection since the surgery, has taken antibiotic without improvement, last course was in June, came to ED for second opinion.  PMH HTN, high cholesterol

## 2018-09-04 NOTE — ED PROVIDER NOTE - PROGRESS NOTE DETAILS
Collins REDDY- pt seen by podiatry and appears surgical wound dehiscence and need for wound care and given outpatient podiatry follow up.Pt may need revision of surgery btu first line recommendation is wound care at this time. No antibiotics indicated, keep area dry and clean and follow up with podiatry clinic

## 2018-09-04 NOTE — ED ADULT NURSE NOTE - OBJECTIVE STATEMENT
Alert and oriented x 4. Pt received to spot 5 intake due to right toe pain. Pt states : " I had multiple surgery on my foot for bunions. It was infected a few months go. I received antibiotics but the infection isn't going away." Pain is 8/10. Pt denies chest pain, shortness of breath, nausea, vomiting or dizziness. IV 20g to right AC. Labs drawn. Pt ambulates. Will continue to monitor.

## 2018-11-02 ENCOUNTER — OUTPATIENT (OUTPATIENT)
Dept: OUTPATIENT SERVICES | Facility: HOSPITAL | Age: 68
LOS: 1 days | End: 2018-11-02
Payer: COMMERCIAL

## 2018-11-02 VITALS
OXYGEN SATURATION: 98 % | DIASTOLIC BLOOD PRESSURE: 80 MMHG | WEIGHT: 182.1 LBS | SYSTOLIC BLOOD PRESSURE: 134 MMHG | HEART RATE: 53 BPM | HEIGHT: 67.5 IN | RESPIRATION RATE: 16 BRPM | TEMPERATURE: 97 F

## 2018-11-02 DIAGNOSIS — L97.514 NON-PRESSURE CHRONIC ULCER OF OTHER PART OF RIGHT FOOT WITH NECROSIS OF BONE: ICD-10-CM

## 2018-11-02 DIAGNOSIS — Z98.890 OTHER SPECIFIED POSTPROCEDURAL STATES: Chronic | ICD-10-CM

## 2018-11-02 DIAGNOSIS — I25.10 ATHEROSCLEROTIC HEART DISEASE OF NATIVE CORONARY ARTERY WITHOUT ANGINA PECTORIS: ICD-10-CM

## 2018-11-02 DIAGNOSIS — M86.679 OTHER CHRONIC OSTEOMYELITIS, UNSPECIFIED ANKLE AND FOOT: ICD-10-CM

## 2018-11-02 DIAGNOSIS — Z95.5 PRESENCE OF CORONARY ANGIOPLASTY IMPLANT AND GRAFT: Chronic | ICD-10-CM

## 2018-11-02 DIAGNOSIS — G56.01 CARPAL TUNNEL SYNDROME, RIGHT UPPER LIMB: Chronic | ICD-10-CM

## 2018-11-02 DIAGNOSIS — I10 ESSENTIAL (PRIMARY) HYPERTENSION: ICD-10-CM

## 2018-11-02 LAB
BUN SERPL-MCNC: 9 MG/DL — SIGNIFICANT CHANGE UP (ref 7–23)
CALCIUM SERPL-MCNC: 9.3 MG/DL — SIGNIFICANT CHANGE UP (ref 8.4–10.5)
CHLORIDE SERPL-SCNC: 100 MMOL/L — SIGNIFICANT CHANGE UP (ref 98–107)
CO2 SERPL-SCNC: 22 MMOL/L — SIGNIFICANT CHANGE UP (ref 22–31)
CREAT SERPL-MCNC: 0.53 MG/DL — SIGNIFICANT CHANGE UP (ref 0.5–1.3)
GLUCOSE SERPL-MCNC: 71 MG/DL — SIGNIFICANT CHANGE UP (ref 70–99)
HCT VFR BLD CALC: 39.8 % — SIGNIFICANT CHANGE UP (ref 34.5–45)
HGB BLD-MCNC: 13.2 G/DL — SIGNIFICANT CHANGE UP (ref 11.5–15.5)
MCHC RBC-ENTMCNC: 32.8 PG — SIGNIFICANT CHANGE UP (ref 27–34)
MCHC RBC-ENTMCNC: 33.2 % — SIGNIFICANT CHANGE UP (ref 32–36)
MCV RBC AUTO: 99 FL — SIGNIFICANT CHANGE UP (ref 80–100)
NRBC # FLD: 0 — SIGNIFICANT CHANGE UP
PLATELET # BLD AUTO: 292 K/UL — SIGNIFICANT CHANGE UP (ref 150–400)
PMV BLD: 11.5 FL — SIGNIFICANT CHANGE UP (ref 7–13)
POTASSIUM SERPL-MCNC: 3.8 MMOL/L — SIGNIFICANT CHANGE UP (ref 3.5–5.3)
POTASSIUM SERPL-SCNC: 3.8 MMOL/L — SIGNIFICANT CHANGE UP (ref 3.5–5.3)
RBC # BLD: 4.02 M/UL — SIGNIFICANT CHANGE UP (ref 3.8–5.2)
RBC # FLD: 12.5 % — SIGNIFICANT CHANGE UP (ref 10.3–14.5)
SODIUM SERPL-SCNC: 137 MMOL/L — SIGNIFICANT CHANGE UP (ref 135–145)
WBC # BLD: 6.79 K/UL — SIGNIFICANT CHANGE UP (ref 3.8–10.5)
WBC # FLD AUTO: 6.79 K/UL — SIGNIFICANT CHANGE UP (ref 3.8–10.5)

## 2018-11-02 PROCEDURE — 93010 ELECTROCARDIOGRAM REPORT: CPT

## 2018-11-02 NOTE — H&P PST ADULT - PSH
Carpal tunnel syndrome, bilateral  b/l wrist surgery for carpal tunnel syndrome   delivery delivered  x2  S/P foot surgery, right  Bunionectomy and hammer toed 2016  Stented coronary artery  x 6

## 2018-11-02 NOTE — H&P PST ADULT - PMH
Anxiety    Bradycardia    CAD (coronary artery disease)  6 stents  Depression    GERD (gastroesophageal reflux disease)    Hyperlipidemia    Hypertension    Other chronic osteomyelitis of foot  right  Reflux gastritis    Smoker

## 2018-11-02 NOTE — H&P PST ADULT - HISTORY OF PRESENT ILLNESS
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  Pt is now scheduled for Amputation of Right 3rd Toed on 11/08/18.

## 2018-11-02 NOTE — H&P PST ADULT - NEGATIVE BREAST SYMPTOMS
no breast lump L/no breast lump R/no nipple discharge L/no breast tenderness R/no breast tenderness L/no nipple discharge R

## 2018-11-02 NOTE — H&P PST ADULT - PROBLEM SELECTOR PLAN 2
Pt instructed to continue Aspirin and Plavix by surgeon as she will be using a tourniquet.  Pt is going for cardiac evaluation on 11/02/18 in preparation for procedure as requested by NP.

## 2018-11-02 NOTE — H&P PST ADULT - NEGATIVE GASTROINTESTINAL SYMPTOMS
no vomiting/no nausea/no diarrhea/no steatorrhea/no hiccoughs/no constipation/no flatulence/no abdominal pain/no hematochezia/no melena/no jaundice

## 2018-11-02 NOTE — H&P PST ADULT - RS GEN PE MLT RESP DETAILS PC
breath sounds equal/no rales/no wheezes/clear to auscultation bilaterally/respirations non-labored/no rhonchi

## 2018-11-02 NOTE — H&P PST ADULT - NEGATIVE ENMT SYMPTOMS
no tinnitus/no vertigo/no nasal obstruction/no hearing difficulty/no sinus symptoms/no nasal congestion/no ear pain/no nasal discharge

## 2018-11-02 NOTE — H&P PST ADULT - PROBLEM SELECTOR PLAN 1
Scheduled for Amputation of Right 3rd Toe on 11/08/18.  Lab results pending.  Preop, famotidine and chlorhexidine instructions provided and questions addressed.

## 2018-11-02 NOTE — H&P PST ADULT - NEGATIVE OPHTHALMOLOGIC SYMPTOMS
no discharge L/no blurred vision L/no blurred vision R/no discharge R/no lacrimation L/no lacrimation R

## 2018-11-02 NOTE — H&P PST ADULT - FAMILY HISTORY
Family history of Alzheimer's disease, Father     Sibling  Still living? Yes, Estimated age: Age Unknown  Family history of heart disease, Age at diagnosis: Age Unknown  Family history of prostate cancer, Age at diagnosis: Age Unknown     Grandparent  Still living? No  Family history of myocardial infarction, Age at diagnosis: Age Unknown  Family history of breast cancer, Age at diagnosis: Age Unknown

## 2018-11-02 NOTE — H&P PST ADULT - GASTROINTESTINAL DETAILS
no bruit/bowel sounds normal/no distention/no masses palpable/no rebound tenderness/no rigidity/no organomegaly/soft/nontender/no guarding

## 2018-11-07 ENCOUNTER — TRANSCRIPTION ENCOUNTER (OUTPATIENT)
Age: 68
End: 2018-11-07

## 2018-11-08 ENCOUNTER — RESULT REVIEW (OUTPATIENT)
Age: 68
End: 2018-11-08

## 2018-11-08 ENCOUNTER — OUTPATIENT (OUTPATIENT)
Dept: OUTPATIENT SERVICES | Facility: HOSPITAL | Age: 68
LOS: 1 days | Discharge: HOME | End: 2018-11-08
Payer: COMMERCIAL

## 2018-11-08 VITALS
WEIGHT: 182.1 LBS | DIASTOLIC BLOOD PRESSURE: 64 MMHG | RESPIRATION RATE: 18 BRPM | TEMPERATURE: 98 F | HEIGHT: 67.5 IN | HEART RATE: 62 BPM | SYSTOLIC BLOOD PRESSURE: 150 MMHG | OXYGEN SATURATION: 99 %

## 2018-11-08 VITALS
RESPIRATION RATE: 16 BRPM | HEART RATE: 55 BPM | DIASTOLIC BLOOD PRESSURE: 75 MMHG | SYSTOLIC BLOOD PRESSURE: 159 MMHG | OXYGEN SATURATION: 96 %

## 2018-11-08 DIAGNOSIS — Z95.5 PRESENCE OF CORONARY ANGIOPLASTY IMPLANT AND GRAFT: Chronic | ICD-10-CM

## 2018-11-08 DIAGNOSIS — Z98.890 OTHER SPECIFIED POSTPROCEDURAL STATES: Chronic | ICD-10-CM

## 2018-11-08 DIAGNOSIS — G56.01 CARPAL TUNNEL SYNDROME, RIGHT UPPER LIMB: Chronic | ICD-10-CM

## 2018-11-08 DIAGNOSIS — L97.514 NON-PRESSURE CHRONIC ULCER OF OTHER PART OF RIGHT FOOT WITH NECROSIS OF BONE: ICD-10-CM

## 2018-11-08 LAB
GRAM STN WND: SIGNIFICANT CHANGE UP
SPECIMEN SOURCE: SIGNIFICANT CHANGE UP

## 2018-11-08 PROCEDURE — 88311 DECALCIFY TISSUE: CPT | Mod: 26

## 2018-11-08 PROCEDURE — 88305 TISSUE EXAM BY PATHOLOGIST: CPT | Mod: 26

## 2018-11-08 NOTE — ASU DISCHARGE PLAN (ADULT/PEDIATRIC). - ACTIVITY LEVEL
in surgical shoe/weight bearing as tolerated no exercise/until cleared by MD. Can walk in surgical shoe/weight bearing as tolerated/no heavy lifting/no sports/gym

## 2018-11-08 NOTE — ASU PREOPERATIVE ASSESSMENT, ADULT (IPARK ONLY) - NOTHING BY MOUTH SINCE
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
07-Nov-2018 19:30

## 2018-11-08 NOTE — ASU DISCHARGE PLAN (ADULT/PEDIATRIC). - NOTIFY
Persistent Nausea and Vomiting/Numbness, color, or temperature change to extremity/Pain not relieved by Medications/Swelling that continues/Fever greater than 101/Bleeding that does not stop

## 2018-11-08 NOTE — ASU DISCHARGE PLAN (ADULT/PEDIATRIC). - NURSING INSTRUCTIONS
Do not mix Percocet with Tylenol (acetaminophen) as it already contains Tylenol.     narcotic pain medicine is constipating,buy over the counter stool softener and take as instructed on the bottle

## 2018-11-09 LAB
CULTURE - ACID FAST SMEAR CONCENTRATED: SIGNIFICANT CHANGE UP
SPECIMEN SOURCE: SIGNIFICANT CHANGE UP

## 2018-11-11 LAB
GRAM STN WND: SIGNIFICANT CHANGE UP
METHOD TYPE: SIGNIFICANT CHANGE UP
METHOD TYPE: SIGNIFICANT CHANGE UP
ORGANISM # SPEC MICROSCOPIC CNT: SIGNIFICANT CHANGE UP

## 2018-11-12 LAB
-  AMIKACIN: SIGNIFICANT CHANGE UP
-  AMIKACIN: SIGNIFICANT CHANGE UP
-  AMPICILLIN/SULBACTAM: SIGNIFICANT CHANGE UP
-  AMPICILLIN/SULBACTAM: SIGNIFICANT CHANGE UP
-  AMPICILLIN: SIGNIFICANT CHANGE UP
-  AMPICILLIN: SIGNIFICANT CHANGE UP
-  AZTREONAM: SIGNIFICANT CHANGE UP
-  AZTREONAM: SIGNIFICANT CHANGE UP
-  CEFAZOLIN: SIGNIFICANT CHANGE UP
-  CEFAZOLIN: SIGNIFICANT CHANGE UP
-  CEFEPIME: SIGNIFICANT CHANGE UP
-  CEFEPIME: SIGNIFICANT CHANGE UP
-  CEFOXITIN: SIGNIFICANT CHANGE UP
-  CEFOXITIN: SIGNIFICANT CHANGE UP
-  CEFTAZIDIME: SIGNIFICANT CHANGE UP
-  CEFTAZIDIME: SIGNIFICANT CHANGE UP
-  CEFTRIAXONE: SIGNIFICANT CHANGE UP
-  CEFTRIAXONE: SIGNIFICANT CHANGE UP
-  CIPROFLOXACIN: SIGNIFICANT CHANGE UP
-  CIPROFLOXACIN: SIGNIFICANT CHANGE UP
-  ERTAPENEM: SIGNIFICANT CHANGE UP
-  ERTAPENEM: SIGNIFICANT CHANGE UP
-  GENTAMICIN: SIGNIFICANT CHANGE UP
-  GENTAMICIN: SIGNIFICANT CHANGE UP
-  IMIPENEM: SIGNIFICANT CHANGE UP
-  IMIPENEM: SIGNIFICANT CHANGE UP
-  LEVOFLOXACIN: SIGNIFICANT CHANGE UP
-  LEVOFLOXACIN: SIGNIFICANT CHANGE UP
-  MEROPENEM: SIGNIFICANT CHANGE UP
-  MEROPENEM: SIGNIFICANT CHANGE UP
-  PIPERACILLIN/TAZOBACTAM: SIGNIFICANT CHANGE UP
-  PIPERACILLIN/TAZOBACTAM: SIGNIFICANT CHANGE UP
-  TIGECYCLINE: SIGNIFICANT CHANGE UP
-  TIGECYCLINE: SIGNIFICANT CHANGE UP
-  TOBRAMYCIN: SIGNIFICANT CHANGE UP
-  TOBRAMYCIN: SIGNIFICANT CHANGE UP
-  TRIMETHOPRIM/SULFAMETHOXAZOLE: SIGNIFICANT CHANGE UP
-  TRIMETHOPRIM/SULFAMETHOXAZOLE: SIGNIFICANT CHANGE UP
CULTURE - SURGICAL SITE: SIGNIFICANT CHANGE UP
ORGANISM # SPEC MICROSCOPIC CNT: SIGNIFICANT CHANGE UP
ORGANISM # SPEC MICROSCOPIC CNT: SIGNIFICANT CHANGE UP

## 2018-11-15 LAB — SPECIMEN SOURCE: SIGNIFICANT CHANGE UP

## 2018-11-19 PROBLEM — F41.9 ANXIETY DISORDER, UNSPECIFIED: Chronic | Status: ACTIVE | Noted: 2018-11-02

## 2018-11-20 ENCOUNTER — EMERGENCY (EMERGENCY)
Facility: HOSPITAL | Age: 68
LOS: 1 days | Discharge: HOME | End: 2018-11-20
Attending: EMERGENCY MEDICINE | Admitting: EMERGENCY MEDICINE
Payer: COMMERCIAL

## 2018-11-20 VITALS
SYSTOLIC BLOOD PRESSURE: 135 MMHG | OXYGEN SATURATION: 96 % | DIASTOLIC BLOOD PRESSURE: 56 MMHG | TEMPERATURE: 98 F | HEART RATE: 52 BPM | RESPIRATION RATE: 16 BRPM

## 2018-11-20 VITALS
HEART RATE: 61 BPM | DIASTOLIC BLOOD PRESSURE: 73 MMHG | OXYGEN SATURATION: 98 % | RESPIRATION RATE: 16 BRPM | SYSTOLIC BLOOD PRESSURE: 127 MMHG

## 2018-11-20 DIAGNOSIS — Z95.5 PRESENCE OF CORONARY ANGIOPLASTY IMPLANT AND GRAFT: Chronic | ICD-10-CM

## 2018-11-20 DIAGNOSIS — Z98.890 OTHER SPECIFIED POSTPROCEDURAL STATES: Chronic | ICD-10-CM

## 2018-11-20 DIAGNOSIS — G56.01 CARPAL TUNNEL SYNDROME, RIGHT UPPER LIMB: Chronic | ICD-10-CM

## 2018-11-20 LAB
ALBUMIN SERPL ELPH-MCNC: 4 G/DL — SIGNIFICANT CHANGE UP (ref 3.3–5)
ALP SERPL-CCNC: 96 U/L — SIGNIFICANT CHANGE UP (ref 40–120)
ALT FLD-CCNC: 19 U/L — SIGNIFICANT CHANGE UP (ref 4–33)
AST SERPL-CCNC: 27 U/L — SIGNIFICANT CHANGE UP (ref 4–32)
BASE EXCESS BLDV CALC-SCNC: 3.1 MMOL/L — SIGNIFICANT CHANGE UP
BASOPHILS # BLD AUTO: 0.04 K/UL — SIGNIFICANT CHANGE UP (ref 0–0.2)
BASOPHILS NFR BLD AUTO: 0.5 % — SIGNIFICANT CHANGE UP (ref 0–2)
BILIRUB SERPL-MCNC: 0.2 MG/DL — SIGNIFICANT CHANGE UP (ref 0.2–1.2)
BLOOD GAS VENOUS - CREATININE: 0.45 MG/DL — LOW (ref 0.5–1.3)
BUN SERPL-MCNC: 12 MG/DL — SIGNIFICANT CHANGE UP (ref 7–23)
CALCIUM SERPL-MCNC: 9 MG/DL — SIGNIFICANT CHANGE UP (ref 8.4–10.5)
CHLORIDE BLDV-SCNC: 107 MMOL/L — SIGNIFICANT CHANGE UP (ref 96–108)
CHLORIDE SERPL-SCNC: 101 MMOL/L — SIGNIFICANT CHANGE UP (ref 98–107)
CO2 SERPL-SCNC: 24 MMOL/L — SIGNIFICANT CHANGE UP (ref 22–31)
CREAT SERPL-MCNC: 0.5 MG/DL — SIGNIFICANT CHANGE UP (ref 0.5–1.3)
EOSINOPHIL # BLD AUTO: 0.46 K/UL — SIGNIFICANT CHANGE UP (ref 0–0.5)
EOSINOPHIL NFR BLD AUTO: 5.8 % — SIGNIFICANT CHANGE UP (ref 0–6)
GAS PNL BLDV: 132 MMOL/L — LOW (ref 136–146)
GLUCOSE BLDV-MCNC: 96 — SIGNIFICANT CHANGE UP (ref 70–99)
GLUCOSE SERPL-MCNC: 98 MG/DL — SIGNIFICANT CHANGE UP (ref 70–99)
HCO3 BLDV-SCNC: 26 MMOL/L — SIGNIFICANT CHANGE UP (ref 20–27)
HCT VFR BLD CALC: 39.1 % — SIGNIFICANT CHANGE UP (ref 34.5–45)
HCT VFR BLDV CALC: 41.6 % — SIGNIFICANT CHANGE UP (ref 34.5–45)
HGB BLD-MCNC: 14 G/DL — SIGNIFICANT CHANGE UP (ref 11.5–15.5)
HGB BLDV-MCNC: 13.5 G/DL — SIGNIFICANT CHANGE UP (ref 11.5–15.5)
IMM GRANULOCYTES # BLD AUTO: 0.02 # — SIGNIFICANT CHANGE UP
IMM GRANULOCYTES NFR BLD AUTO: 0.3 % — SIGNIFICANT CHANGE UP (ref 0–1.5)
LACTATE BLDV-MCNC: 1.1 MMOL/L — SIGNIFICANT CHANGE UP (ref 0.5–2)
LYMPHOCYTES # BLD AUTO: 2.33 K/UL — SIGNIFICANT CHANGE UP (ref 1–3.3)
LYMPHOCYTES # BLD AUTO: 29.4 % — SIGNIFICANT CHANGE UP (ref 13–44)
MCHC RBC-ENTMCNC: 34.5 PG — HIGH (ref 27–34)
MCHC RBC-ENTMCNC: 35.8 % — SIGNIFICANT CHANGE UP (ref 32–36)
MCV RBC AUTO: 96.3 FL — SIGNIFICANT CHANGE UP (ref 80–100)
MONOCYTES # BLD AUTO: 0.88 K/UL — SIGNIFICANT CHANGE UP (ref 0–0.9)
MONOCYTES NFR BLD AUTO: 11.1 % — SIGNIFICANT CHANGE UP (ref 2–14)
NEUTROPHILS # BLD AUTO: 4.2 K/UL — SIGNIFICANT CHANGE UP (ref 1.8–7.4)
NEUTROPHILS NFR BLD AUTO: 52.9 % — SIGNIFICANT CHANGE UP (ref 43–77)
NRBC # FLD: 0 — SIGNIFICANT CHANGE UP
PCO2 BLDV: 42 MMHG — SIGNIFICANT CHANGE UP (ref 41–51)
PH BLDV: 7.42 PH — SIGNIFICANT CHANGE UP (ref 7.32–7.43)
PLATELET # BLD AUTO: 327 K/UL — SIGNIFICANT CHANGE UP (ref 150–400)
PMV BLD: 10.9 FL — SIGNIFICANT CHANGE UP (ref 7–13)
PO2 BLDV: 40 MMHG — SIGNIFICANT CHANGE UP (ref 35–40)
POTASSIUM BLDV-SCNC: 3.5 MMOL/L — SIGNIFICANT CHANGE UP (ref 3.4–4.5)
POTASSIUM SERPL-MCNC: 3.9 MMOL/L — SIGNIFICANT CHANGE UP (ref 3.5–5.3)
POTASSIUM SERPL-SCNC: 3.9 MMOL/L — SIGNIFICANT CHANGE UP (ref 3.5–5.3)
PROT SERPL-MCNC: 7.3 G/DL — SIGNIFICANT CHANGE UP (ref 6–8.3)
RBC # BLD: 4.06 M/UL — SIGNIFICANT CHANGE UP (ref 3.8–5.2)
RBC # FLD: 12.2 % — SIGNIFICANT CHANGE UP (ref 10.3–14.5)
SAO2 % BLDV: 71.5 % — SIGNIFICANT CHANGE UP (ref 60–85)
SODIUM SERPL-SCNC: 136 MMOL/L — SIGNIFICANT CHANGE UP (ref 135–145)
WBC # BLD: 7.93 K/UL — SIGNIFICANT CHANGE UP (ref 3.8–10.5)
WBC # FLD AUTO: 7.93 K/UL — SIGNIFICANT CHANGE UP (ref 3.8–10.5)

## 2018-11-20 PROCEDURE — 99283 EMERGENCY DEPT VISIT LOW MDM: CPT

## 2018-11-20 PROCEDURE — 73630 X-RAY EXAM OF FOOT: CPT | Mod: 26,RT

## 2018-11-20 RX ORDER — CEFUROXIME AXETIL 250 MG
1 TABLET ORAL
Qty: 20 | Refills: 0 | OUTPATIENT
Start: 2018-11-20 | End: 2018-11-29

## 2018-11-20 NOTE — CONSULT NOTE ADULT - ASSESSMENT
67 y/o female pt s/p right 3rd toe amp  - pt seen and evaluated  - no signs of infection present at this time  - new dressing applied to right foot with 4x4 gauze, greta and ACE bandage  - pt to follow up with podiatry and ID as outpatient  - d/w podiatry attending Dr. Julio 67 y/o female pt s/p right 3rd toe amp  - pt seen and evaluated  - no signs of infection present at this time  - new dressing applied to right foot with 4x4 gauze, greta and ACE bandage  - rec Cefixime 200mg BID   - pt to follow up with podiatry and ID as outpatient  - d/w podiatry attending Dr. Julio

## 2018-11-20 NOTE — ED PROVIDER NOTE - MEDICAL DECISION MAKING DETAILS
+wound culture - discussed with podiatry resident who cam eto see patient, ID Dr. Wong and lab work shows normal wbc, no lactate, normal vitals and pt well appearing, pod recommended cefxime but not available at hospital or at pt's preferred pharmacy, will choose [facility].

## 2018-11-20 NOTE — ED PROVIDER NOTE - PHYSICAL EXAMINATION
right foot 3rd toe absent - wound clean dry and intact, slight softness to lateral 2nd toe web space. no erythema, no edema no tn, good sensation distal, multiple sutures present. pulses nromal, cap refill normal.

## 2018-11-20 NOTE — CONSULT NOTE ADULT - SUBJECTIVE AND OBJECTIVE BOX
Patient is a 68y old  Female who presents s/p right 3rd toe amp    HPI: Pt presenting 12 days s/p right foot 3rd toe amputation w/ Dr. Julio. Pt states that he was told by the attending that culture taken during the surgery was positive and she needed to be seen by Infectious Disease for outpatient antibiotics recommendations. She denies any pain and any f/n/v/c/sob.      PAST MEDICAL & SURGICAL HISTORY:  Other chronic osteomyelitis of foot: right  Anxiety  Depression  Hyperlipidemia  Hypertension  Bradycardia  Smoker  GERD (gastroesophageal reflux disease)  Reflux gastritis  CAD (coronary artery disease): 6 stents  S/P foot surgery, right: Bunionectomy and hammer toed 2016  Carpal tunnel syndrome, bilateral: b/l wrist surgery for carpal tunnel syndrome  Stented coronary artery: x 6   delivery delivered: x2      MEDICATIONS  (STANDING):    MEDICATIONS  (PRN):      Allergies    No Known Allergies    Intolerances        VITALS:    Vital Signs Last 24 Hrs  T(C): 36.8 (2018 15:59), Max: 36.8 (2018 15:59)  T(F): 98.3 (2018 15:59), Max: 98.3 (2018 15:59)  HR: 61 (2018 17:59) (52 - 61)  BP: 127/73 (2018 17:59) (127/73 - 135/56)  BP(mean): --  RR: 16 (2018 17:59) (16 - 16)  SpO2: 98% (2018 17:59) (96% - 98%)    LABS:                          14.0   7.93  )-----------( 327      ( 2018 17:03 )             39.1       11-20    136  |  101  |  12  ----------------------------<  98  3.9   |  24  |  0.50    Ca    9.0      2018 17:03    TPro  7.3  /  Alb  4.0  /  TBili  0.2  /  DBili  x   /  AST  27  /  ALT  19  /  AlkPhos  96  11-20      CAPILLARY BLOOD GLUCOSE              LOWER EXTREMITY PHYSICAL EXAM:    Vasular: DP/PT 2/4, B/L, CFT <3 seconds B/L, Temperature gradient WNL, B/L.   Neuro: Epicritic sensation intact to the level of toes B/L.  Musculoskeletal/Ortho: no pain with palpation of foot, s/p right 3rd toe amp   Skin 3rd toe amp site on right with sutures intact, wound well coapted with no signs of infection      RADIOLOGY & ADDITIONAL STUDIES:  < from: Xray Foot AP + Lateral + Oblique, Right (18 @ 17:19) >    ******PRELIMINARY REPORT******    ******PRELIMINARY REPORT******            EXAM:  RAD FOOT MIN 3 VIEWS RIGHT        PROCEDURE DATE:  2018     ******PRELIMINARY REPORT******    ******PRELIMINARY REPORT******            INTERPRETATION:  No radiographic evidence of osteomyelitis. MRI may be   performed for more sensitive evaluation if clinically warranted and if   there are no MRI contraindications.            ******PRELIMINARY REPORT******    ******PRELIMINARY REPORT******          LEOPOLDO ANDERS M.D., RADIOLOGY RESIDENT        < end of copied text >

## 2018-11-20 NOTE — ED PROVIDER NOTE - OBJECTIVE STATEMENT
68 YOF sent to ED for positive wound culture. Pt is s/o 3rd right toe amputation 2 weeks ago after osteomyelitis from previous surgery. Pt has no fever, no worsening swelling or pain. Sent to see Dr. Wong of [facility] but they are at [facility] only.

## 2018-11-21 LAB
SPECIMEN SOURCE: SIGNIFICANT CHANGE UP
SPECIMEN SOURCE: SIGNIFICANT CHANGE UP

## 2018-11-21 NOTE — CHART NOTE - NSCHARTNOTEFT_GEN_A_CORE
My Colleague was called to evaluate pt in ED yesterday evening.  Advise was given to start cefepime and the pt would be seen by me in the am.    Subsequent to this, pt was seen by podiatry and the ED. Podiatry and the ED staff did not think the wound looked infected.  They decided to discharge the patient on oral antibiotics prior to ID evaluation.    Patient can follow up as outpt for an ID evaluation as needed.  Call [phone].    This was discussed with podiatry attending who also does not believe that the wound looks clinically infected.  She would like abx prophylaxis which she has prescribed.  She hoff snot think the patient needs an ID evaluation at this time but is aware that we are available if needed.

## 2018-11-25 LAB
BACTERIA BLD CULT: SIGNIFICANT CHANGE UP
BACTERIA BLD CULT: SIGNIFICANT CHANGE UP

## 2018-12-04 NOTE — ED PROVIDER NOTE - MUSCULOSKELETAL [+], MLM
initial encounter  -     baclofen (LIORESAL) 10 MG tablet; Take 0.5 tablets by mouth nightly as needed (muscle spasms)      Exercises given today and discussed not taking the muscle relaxer and driving until he knows how it affects him. Continue ibuprofen          Return in about 2 weeks (around 12/18/2018), or if symptoms worsen or fail to improve, for lumbar strain. I have reviewed my findings and recommendations with Mc Noriega.     Gretchen Mcgill, NP-C, FNP-BC [facility]

## 2018-12-20 LAB — ACID FAST STN SPEC: SIGNIFICANT CHANGE UP

## 2018-12-31 ENCOUNTER — APPOINTMENT (OUTPATIENT)
Dept: INFECTIOUS DISEASE | Facility: CLINIC | Age: 68
End: 2018-12-31

## 2019-01-23 ENCOUNTER — INPATIENT (INPATIENT)
Facility: HOSPITAL | Age: 69
LOS: 0 days | Discharge: HOME | End: 2019-01-24
Attending: INTERNAL MEDICINE | Admitting: INTERNAL MEDICINE
Payer: COMMERCIAL

## 2019-01-23 VITALS
DIASTOLIC BLOOD PRESSURE: 92 MMHG | HEART RATE: 61 BPM | TEMPERATURE: 98 F | RESPIRATION RATE: 16 BRPM | SYSTOLIC BLOOD PRESSURE: 146 MMHG | OXYGEN SATURATION: 100 %

## 2019-01-23 DIAGNOSIS — Z98.890 OTHER SPECIFIED POSTPROCEDURAL STATES: Chronic | ICD-10-CM

## 2019-01-23 DIAGNOSIS — R07.9 CHEST PAIN, UNSPECIFIED: ICD-10-CM

## 2019-01-23 DIAGNOSIS — I10 ESSENTIAL (PRIMARY) HYPERTENSION: ICD-10-CM

## 2019-01-23 DIAGNOSIS — G56.01 CARPAL TUNNEL SYNDROME, RIGHT UPPER LIMB: Chronic | ICD-10-CM

## 2019-01-23 DIAGNOSIS — Z95.5 PRESENCE OF CORONARY ANGIOPLASTY IMPLANT AND GRAFT: Chronic | ICD-10-CM

## 2019-01-23 DIAGNOSIS — E78.5 HYPERLIPIDEMIA, UNSPECIFIED: ICD-10-CM

## 2019-01-23 DIAGNOSIS — F32.9 MAJOR DEPRESSIVE DISORDER, SINGLE EPISODE, UNSPECIFIED: ICD-10-CM

## 2019-01-23 DIAGNOSIS — I25.10 ATHEROSCLEROTIC HEART DISEASE OF NATIVE CORONARY ARTERY WITHOUT ANGINA PECTORIS: ICD-10-CM

## 2019-01-23 LAB
ALBUMIN SERPL ELPH-MCNC: 4.2 G/DL — SIGNIFICANT CHANGE UP (ref 3.3–5)
ALP SERPL-CCNC: 95 U/L — SIGNIFICANT CHANGE UP (ref 40–120)
ALT FLD-CCNC: 20 U/L — SIGNIFICANT CHANGE UP (ref 4–33)
ANION GAP SERPL CALC-SCNC: 16 MMO/L — HIGH (ref 7–14)
APTT BLD: 30.7 SEC — SIGNIFICANT CHANGE UP (ref 27.5–36.3)
AST SERPL-CCNC: 22 U/L — SIGNIFICANT CHANGE UP (ref 4–32)
BASOPHILS # BLD AUTO: 0.03 K/UL — SIGNIFICANT CHANGE UP (ref 0–0.2)
BASOPHILS NFR BLD AUTO: 0.4 % — SIGNIFICANT CHANGE UP (ref 0–2)
BILIRUB SERPL-MCNC: 0.5 MG/DL — SIGNIFICANT CHANGE UP (ref 0.2–1.2)
BUN SERPL-MCNC: 11 MG/DL — SIGNIFICANT CHANGE UP (ref 7–23)
CALCIUM SERPL-MCNC: 9.1 MG/DL — SIGNIFICANT CHANGE UP (ref 8.4–10.5)
CHLORIDE SERPL-SCNC: 99 MMOL/L — SIGNIFICANT CHANGE UP (ref 98–107)
CO2 SERPL-SCNC: 22 MMOL/L — SIGNIFICANT CHANGE UP (ref 22–31)
CREAT SERPL-MCNC: 0.63 MG/DL — SIGNIFICANT CHANGE UP (ref 0.5–1.3)
EOSINOPHIL # BLD AUTO: 0.25 K/UL — SIGNIFICANT CHANGE UP (ref 0–0.5)
EOSINOPHIL NFR BLD AUTO: 3 % — SIGNIFICANT CHANGE UP (ref 0–6)
GLUCOSE SERPL-MCNC: 90 MG/DL — SIGNIFICANT CHANGE UP (ref 70–99)
HCT VFR BLD CALC: 40.4 % — SIGNIFICANT CHANGE UP (ref 34.5–45)
HGB BLD-MCNC: 13.7 G/DL — SIGNIFICANT CHANGE UP (ref 11.5–15.5)
IMM GRANULOCYTES NFR BLD AUTO: 0.5 % — SIGNIFICANT CHANGE UP (ref 0–1.5)
INR BLD: 1 — SIGNIFICANT CHANGE UP (ref 0.88–1.17)
LYMPHOCYTES # BLD AUTO: 2.24 K/UL — SIGNIFICANT CHANGE UP (ref 1–3.3)
LYMPHOCYTES # BLD AUTO: 27.1 % — SIGNIFICANT CHANGE UP (ref 13–44)
MCHC RBC-ENTMCNC: 32.9 PG — SIGNIFICANT CHANGE UP (ref 27–34)
MCHC RBC-ENTMCNC: 33.9 % — SIGNIFICANT CHANGE UP (ref 32–36)
MCV RBC AUTO: 97.1 FL — SIGNIFICANT CHANGE UP (ref 80–100)
MONOCYTES # BLD AUTO: 0.64 K/UL — SIGNIFICANT CHANGE UP (ref 0–0.9)
MONOCYTES NFR BLD AUTO: 7.7 % — SIGNIFICANT CHANGE UP (ref 2–14)
NEUTROPHILS # BLD AUTO: 5.08 K/UL — SIGNIFICANT CHANGE UP (ref 1.8–7.4)
NEUTROPHILS NFR BLD AUTO: 61.3 % — SIGNIFICANT CHANGE UP (ref 43–77)
NRBC # FLD: 0 K/UL — LOW (ref 25–125)
NT-PROBNP SERPL-SCNC: 114.2 PG/ML — SIGNIFICANT CHANGE UP
NT-PROBNP SERPL-SCNC: 117.9 PG/ML — SIGNIFICANT CHANGE UP
PLATELET # BLD AUTO: 277 K/UL — SIGNIFICANT CHANGE UP (ref 150–400)
PMV BLD: 11.3 FL — SIGNIFICANT CHANGE UP (ref 7–13)
POTASSIUM SERPL-MCNC: 4.1 MMOL/L — SIGNIFICANT CHANGE UP (ref 3.5–5.3)
POTASSIUM SERPL-SCNC: 4.1 MMOL/L — SIGNIFICANT CHANGE UP (ref 3.5–5.3)
PROT SERPL-MCNC: 7.3 G/DL — SIGNIFICANT CHANGE UP (ref 6–8.3)
PROTHROM AB SERPL-ACNC: 11.1 SEC — SIGNIFICANT CHANGE UP (ref 9.8–13.1)
RBC # BLD: 4.16 M/UL — SIGNIFICANT CHANGE UP (ref 3.8–5.2)
RBC # FLD: 12.8 % — SIGNIFICANT CHANGE UP (ref 10.3–14.5)
SODIUM SERPL-SCNC: 137 MMOL/L — SIGNIFICANT CHANGE UP (ref 135–145)
TROPONIN T, HIGH SENSITIVITY: 8 NG/L — SIGNIFICANT CHANGE UP (ref ?–14)
TROPONIN T, HIGH SENSITIVITY: 9 NG/L — SIGNIFICANT CHANGE UP (ref ?–14)
WBC # BLD: 8.28 K/UL — SIGNIFICANT CHANGE UP (ref 3.8–10.5)
WBC # FLD AUTO: 8.28 K/UL — SIGNIFICANT CHANGE UP (ref 3.8–10.5)

## 2019-01-23 PROCEDURE — 71046 X-RAY EXAM CHEST 2 VIEWS: CPT | Mod: 26

## 2019-01-23 RX ORDER — ASPIRIN/CALCIUM CARB/MAGNESIUM 324 MG
162 TABLET ORAL ONCE
Qty: 0 | Refills: 0 | Status: DISCONTINUED | OUTPATIENT
Start: 2019-01-23 | End: 2019-01-23

## 2019-01-23 RX ORDER — SODIUM CHLORIDE 9 MG/ML
500 INJECTION INTRAMUSCULAR; INTRAVENOUS; SUBCUTANEOUS
Qty: 0 | Refills: 0 | Status: DISCONTINUED | OUTPATIENT
Start: 2019-01-23 | End: 2019-01-23

## 2019-01-23 RX ORDER — CLOPIDOGREL BISULFATE 75 MG/1
75 TABLET, FILM COATED ORAL DAILY
Qty: 0 | Refills: 0 | Status: DISCONTINUED | OUTPATIENT
Start: 2019-01-23 | End: 2019-01-24

## 2019-01-23 RX ORDER — CARVEDILOL PHOSPHATE 80 MG/1
6.25 CAPSULE, EXTENDED RELEASE ORAL EVERY 12 HOURS
Qty: 0 | Refills: 0 | Status: DISCONTINUED | OUTPATIENT
Start: 2019-01-23 | End: 2019-01-24

## 2019-01-23 RX ORDER — HYDROCHLOROTHIAZIDE 25 MG
25 TABLET ORAL DAILY
Qty: 0 | Refills: 0 | Status: DISCONTINUED | OUTPATIENT
Start: 2019-01-23 | End: 2019-01-24

## 2019-01-23 RX ORDER — AMLODIPINE BESYLATE 2.5 MG/1
10 TABLET ORAL DAILY
Qty: 0 | Refills: 0 | Status: DISCONTINUED | OUTPATIENT
Start: 2019-01-23 | End: 2019-01-24

## 2019-01-23 RX ORDER — HEPARIN SODIUM 5000 [USP'U]/ML
5000 INJECTION INTRAVENOUS; SUBCUTANEOUS EVERY 12 HOURS
Qty: 0 | Refills: 0 | Status: DISCONTINUED | OUTPATIENT
Start: 2019-01-24 | End: 2019-01-24

## 2019-01-23 RX ORDER — SODIUM CHLORIDE 9 MG/ML
3 INJECTION INTRAMUSCULAR; INTRAVENOUS; SUBCUTANEOUS EVERY 8 HOURS
Qty: 0 | Refills: 0 | Status: DISCONTINUED | OUTPATIENT
Start: 2019-01-23 | End: 2019-01-24

## 2019-01-23 RX ORDER — ASPIRIN/CALCIUM CARB/MAGNESIUM 324 MG
162 TABLET ORAL ONCE
Qty: 0 | Refills: 0 | Status: COMPLETED | OUTPATIENT
Start: 2019-01-23 | End: 2019-01-23

## 2019-01-23 RX ORDER — ATORVASTATIN CALCIUM 80 MG/1
40 TABLET, FILM COATED ORAL AT BEDTIME
Qty: 0 | Refills: 0 | Status: DISCONTINUED | OUTPATIENT
Start: 2019-01-23 | End: 2019-01-24

## 2019-01-23 RX ORDER — CITALOPRAM 10 MG/1
20 TABLET, FILM COATED ORAL DAILY
Qty: 0 | Refills: 0 | Status: DISCONTINUED | OUTPATIENT
Start: 2019-01-23 | End: 2019-01-24

## 2019-01-23 RX ORDER — ASPIRIN/CALCIUM CARB/MAGNESIUM 324 MG
81 TABLET ORAL DAILY
Qty: 0 | Refills: 0 | Status: DISCONTINUED | OUTPATIENT
Start: 2019-01-24 | End: 2019-01-24

## 2019-01-23 RX ORDER — VALSARTAN 80 MG/1
320 TABLET ORAL DAILY
Qty: 0 | Refills: 0 | Status: DISCONTINUED | OUTPATIENT
Start: 2019-01-23 | End: 2019-01-24

## 2019-01-23 RX ADMIN — Medication 162 MILLIGRAM(S): at 12:32

## 2019-01-23 RX ADMIN — SODIUM CHLORIDE 75 MILLILITER(S): 9 INJECTION INTRAMUSCULAR; INTRAVENOUS; SUBCUTANEOUS at 17:45

## 2019-01-23 RX ADMIN — ATORVASTATIN CALCIUM 40 MILLIGRAM(S): 80 TABLET, FILM COATED ORAL at 23:43

## 2019-01-23 RX ADMIN — SODIUM CHLORIDE 3 MILLILITER(S): 9 INJECTION INTRAMUSCULAR; INTRAVENOUS; SUBCUTANEOUS at 23:00

## 2019-01-23 NOTE — H&P CARDIOLOGY - RS GEN PE MLT RESP DETAILS PC
good air movement/clear to auscultation bilaterally/breath sounds equal/no chest wall tenderness/respirations non-labored/airway patent

## 2019-01-23 NOTE — CONSULT NOTE ADULT - ASSESSMENT
69 y/o female with pmhx of HTN, HLD, CAD with 6 stents on ASA and Plavix presents to ED c/o CP and SOB x 1 month. CP with exertion and at rest. Non-radiating. Not pleuritic. Described as heaviness and pressure, sometimes has to grab her chest. Pt woke up with 8/10 pain last night, now 3/10. Pt is now S/Pcardiac catheretization.

## 2019-01-23 NOTE — ED PROVIDER NOTE - OBJECTIVE STATEMENT
69 y/o female with pmhx of HTN, HLD, CAD with 6 stents on ASA and Plavix presents to ED c/o CP and SOB x 1 month. CP with exertion and at rest. Non-radiating. Not pleuritic. Described as heaviness and pressure, sometimes has to grab her chest. Pt woke up with 8/10 pain last night, now 3/10. Took ASA 81mg prior to arrival. Last stress test just over 1 year in January. Cardiologist Dr. Mendoza. No recent travel, surgeries, hospitalizations, LE edema, calf pain, hx of dvt. No fever, chills, palpitations, abd pain, n/v, diaphoresis, weakness, numbness, tingling. +smoker 1 pack per day

## 2019-01-23 NOTE — ED PROVIDER NOTE - ATTENDING CONTRIBUTION TO CARE
Locurto  pt here with 2 weeks of left sided chest pain with exertion with associated SOB  also occurs at rest at times  lasts minutes  c/w prior ischemic pain  pt with h/o multiple stents on ASA and plavix  still smokes 1PPD    exam  pt in no visible distress  lung fields expanded  no wheezes or rales  card  mildly bradycardic  regular no murmur  abd benign   no perif edema    resting EKG  no ischemic changes     contacted cardiology   pt to undergo cath today

## 2019-01-23 NOTE — ED ADULT TRIAGE NOTE - TEMPERATURE IN CELSIUS (DEGREES C)
Pt OOB for breakfast, calm and pleasant upon approach  Pt seculsive to his room and scant in conversation  Denies SI/H; reports anxiety and depression  Refused prn Anti-anxiety medication offered  Will continue to monitor  36.6

## 2019-01-23 NOTE — ED PROVIDER NOTE - PROGRESS NOTE DETAILS
SYEDA Henry- spoke with Dr. Urbano and NP, suggesting to admit for cath today SYEDA Henry- spoke with Dr. Urbano and NP, suggesting to admit for cath today. tele pa paged

## 2019-01-23 NOTE — SBIRT NOTE. - NSSBIRTSERVICES_GEN_A_ED_FT
Provided SBIRT services: Full screen Negative. Positive reinforcement provided given patient currently within healthy guidelines. Education materials reviewed and given to patient.  Audit Score: 5  DAST Score: 0  Duration = 10 Minutes

## 2019-01-23 NOTE — ED PROVIDER NOTE - MEDICAL DECISION MAKING DETAILS
67 y/o female with pmhx of HTN, HLD, CAD with 6 stents on ASA and Plavix presents to ED c/o CP and SOB x 1 month. will check labs, cxr, admit to tele. EKG unchanged.

## 2019-01-23 NOTE — H&P CARDIOLOGY - HISTORY OF PRESENT ILLNESS
69 y/o female with pmhx of HTN, HLD, CAD with 6 stents on ASA and Plavix presents to ED c/o CP and SOB x 1 month. CP with exertion and at rest. Non-radiating. Not pleuritic. Described as heaviness and pressure, sometimes has to grab her chest. Pt woke up with 8/10 pain last night, now 3/10. Pt is now admitted for cardiac catheretization.

## 2019-01-23 NOTE — CONSULT NOTE ADULT - SUBJECTIVE AND OBJECTIVE BOX
Patient is a 68y old  Female who presents with a chief complaint of chest Pain     HPI:  67 y/o female with pmhx of HTN, HLD, CAD with 6 stents on ASA and Plavix presents to ED c/o CP and SOB x 1 month. CP with exertion and at rest. Non-radiating. Not pleuritic. Described as heaviness and pressure, sometimes has to grab her chest. Pt woke up with 8/10 pain last night, now 3/10. Pt is now S/Pcardiac catheretization.       PAST MEDICAL & SURGICAL HISTORY:  Other chronic osteomyelitis of foot: right  Anxiety  Depression  Hyperlipidemia  Hypertension  Bradycardia  Smoker  GERD (gastroesophageal reflux disease)  Reflux gastritis  CAD (coronary artery disease): 6 stents  S/P foot surgery, right: Bunionectomy and hammer toed 2016  Carpal tunnel syndrome, bilateral: b/l wrist surgery for carpal tunnel syndrome  Stented coronary artery: x 6   delivery delivered: x2      Social History: Denies smoking ,alcohol and drugs .     FAMILY HISTORY:  Family history of prostate cancer (Sibling): brother  Family history of Alzheimer's disease: Father  Family history of heart disease (Sibling): Twin sister has cardiac stents  Brother has cardiac stents  Family history of breast cancer (Grandparent): Maternal Grandmother  Family history of myocardial infarction (Grandparent): Paternal Grandmother diet in her 60&#x27;s of MI      Allergies    No Known Allergies    Intolerances        REVIEW OF SYSTEMS:    CONSTITUTIONAL: No fever, weight loss, or fatigue  EYES: No eye pain, visual disturbances, or discharge  RESPIRATORY: No cough, wheezing, chills or hemoptysis; No shortness of breath  CARDIOVASCULAR: No chest pain, palpitations, dizziness, or leg swelling  GASTROINTESTINAL: No abdominal or epigastric pain. No nausea, vomiting, or hematemesis; No diarrhea or constipation. No melena or hematochezia.  GENITOURINARY: No dysuria, frequency, hematuria, or incontinence  NEUROLOGICAL: No headaches, memory loss, loss of strength, numbness, or tremors  SKIN: No itching, burning, rashes, or lesions   ENDOCRINE: No heat or cold intolerance; No hair loss  MUSCULOSKELETAL: No joint pain or swelling; No muscle, back, or extremity pain      MEDICATIONS  (STANDING):  amLODIPine   Tablet 10 milliGRAM(s) Oral daily  atorvastatin 40 milliGRAM(s) Oral at bedtime  carvedilol 6.25 milliGRAM(s) Oral every 12 hours  citalopram 20 milliGRAM(s) Oral daily  clopidogrel Tablet 75 milliGRAM(s) Oral daily  hydrochlorothiazide 25 milliGRAM(s) Oral daily  sodium chloride 0.9% lock flush 3 milliLiter(s) IV Push every 8 hours  sodium chloride 0.9%. 500 milliLiter(s) (75 mL/Hr) IV Continuous <Continuous>  valsartan 320 milliGRAM(s) Oral daily    MEDICATIONS  (PRN):      Vital Signs Last 24 Hrs  T(C): 36.6 (2019 11:08), Max: 36.6 (2019 11:08)  T(F): 97.9 (2019 11:08), Max: 97.9 (2019 11:08)  HR: 60 (2019 12:06) (60 - 61)  BP: 137/119 (2019 12:06) (137/119 - 146/92)  BP(mean): --  RR: 19 (2019 12:06) (16 - 19)  SpO2: 100% (2019 12:06) (100% - 100%)    PHYSICAL EXAM:    GENERAL: NAD, well-groomed, well-developed  HEAD:  Atraumatic, Normocephalic  EYES: EOMI, PERRLA, conjunctiva and sclera clear  NECK: Supple, No JVD, Normal thyroid  NERVOUS SYSTEM:  Alert & Oriented X3, Good concentration; No focal sign   CHEST/LUNG: Clear to percussion bilaterally; No rales, rhonchi, wheezing, or rubs  HEART: Regular rate and rhythm; No murmurs, rubs, or gallops  ABDOMEN: Soft, Nontender, Nondistended; Bowel sounds present  EXTREMITIES:  2+ Peripheral Pulses, No clubbing, cyanosis, or edema    LABS:                        13.7   8.28  )-----------( 277      ( 2019 12:00 )             40.4     -    137  |  99  |  11  ----------------------------<  90  4.1   |  22  |  0.63    Ca    9.1      2019 12:00    TPro  7.3  /  Alb  4.2  /  TBili  0.5  /  DBili  x   /  AST  22  /  ALT  20  /  AlkPhos  95  -    PT/INR - ( 2019 12:00 )   PT: 11.1 SEC;   INR: 1.00          PTT - ( 2019 12:00 )  PTT:30.7 SEC        RADIOLOGY & ADDITIONAL STUDIES:

## 2019-01-23 NOTE — H&P CARDIOLOGY - ATTENDING COMMENTS
Patient seen and examined.  Agree with above.   -admitted with chest pain and new angina  -cath performed showing patent stents and mild cad  -medical management recommended  -workup of non cardiac chest pain per medicine    Payam Urbano MD

## 2019-01-23 NOTE — ED ADULT NURSE NOTE - NSIMPLEMENTINTERV_GEN_ALL_ED
Implemented All Universal Safety Interventions:  [place] to call system. Call bell, personal items and telephone within reach. Instruct patient to call for assistance. Room bathroom lighting operational. Non-slip footwear when patient is off stretcher. Physically safe environment: no spills, clutter or unnecessary equipment. Stretcher in lowest position, wheels locked, appropriate side rails in place.

## 2019-01-23 NOTE — ED ADULT NURSE NOTE - OBJECTIVE STATEMENT
A&Ox3 c/o midsternal chest pain that feels like twisting with periods of SOB, reports hx of 6 stents, last stress and echo was last January, nsr on monitor, respirations equal and non labored, sating 99% on room air denies n/v/d

## 2019-01-24 ENCOUNTER — TRANSCRIPTION ENCOUNTER (OUTPATIENT)
Age: 69
End: 2019-01-24

## 2019-01-24 VITALS
RESPIRATION RATE: 18 BRPM | DIASTOLIC BLOOD PRESSURE: 73 MMHG | SYSTOLIC BLOOD PRESSURE: 122 MMHG | OXYGEN SATURATION: 98 % | HEART RATE: 59 BPM | TEMPERATURE: 99 F

## 2019-01-24 LAB
ANION GAP SERPL CALC-SCNC: 13 MMO/L — SIGNIFICANT CHANGE UP (ref 7–14)
BUN SERPL-MCNC: 15 MG/DL — SIGNIFICANT CHANGE UP (ref 7–23)
CALCIUM SERPL-MCNC: 9.3 MG/DL — SIGNIFICANT CHANGE UP (ref 8.4–10.5)
CHLORIDE SERPL-SCNC: 101 MMOL/L — SIGNIFICANT CHANGE UP (ref 98–107)
CO2 SERPL-SCNC: 24 MMOL/L — SIGNIFICANT CHANGE UP (ref 22–31)
CREAT SERPL-MCNC: 0.59 MG/DL — SIGNIFICANT CHANGE UP (ref 0.5–1.3)
GLUCOSE SERPL-MCNC: 148 MG/DL — HIGH (ref 70–99)
HCT VFR BLD CALC: 38.7 % — SIGNIFICANT CHANGE UP (ref 34.5–45)
HGB BLD-MCNC: 13.2 G/DL — SIGNIFICANT CHANGE UP (ref 11.5–15.5)
MCHC RBC-ENTMCNC: 34 PG — SIGNIFICANT CHANGE UP (ref 27–34)
MCHC RBC-ENTMCNC: 34.1 % — SIGNIFICANT CHANGE UP (ref 32–36)
MCV RBC AUTO: 99.7 FL — SIGNIFICANT CHANGE UP (ref 80–100)
NRBC # FLD: 0 K/UL — LOW (ref 25–125)
PLATELET # BLD AUTO: 247 K/UL — SIGNIFICANT CHANGE UP (ref 150–400)
PMV BLD: 11.4 FL — SIGNIFICANT CHANGE UP (ref 7–13)
POTASSIUM SERPL-MCNC: 3.9 MMOL/L — SIGNIFICANT CHANGE UP (ref 3.5–5.3)
POTASSIUM SERPL-SCNC: 3.9 MMOL/L — SIGNIFICANT CHANGE UP (ref 3.5–5.3)
RBC # BLD: 3.88 M/UL — SIGNIFICANT CHANGE UP (ref 3.8–5.2)
RBC # FLD: 13 % — SIGNIFICANT CHANGE UP (ref 10.3–14.5)
SODIUM SERPL-SCNC: 138 MMOL/L — SIGNIFICANT CHANGE UP (ref 135–145)
WBC # BLD: 8.07 K/UL — SIGNIFICANT CHANGE UP (ref 3.8–10.5)
WBC # FLD AUTO: 8.07 K/UL — SIGNIFICANT CHANGE UP (ref 3.8–10.5)

## 2019-01-24 PROCEDURE — 71275 CT ANGIOGRAPHY CHEST: CPT | Mod: 26

## 2019-01-24 RX ORDER — INFLUENZA VIRUS VACCINE 15; 15; 15; 15 UG/.5ML; UG/.5ML; UG/.5ML; UG/.5ML
0.5 SUSPENSION INTRAMUSCULAR ONCE
Qty: 0 | Refills: 0 | Status: DISCONTINUED | OUTPATIENT
Start: 2019-01-24 | End: 2019-01-24

## 2019-01-24 RX ADMIN — VALSARTAN 320 MILLIGRAM(S): 80 TABLET ORAL at 10:10

## 2019-01-24 RX ADMIN — Medication 25 MILLIGRAM(S): at 10:10

## 2019-01-24 RX ADMIN — AMLODIPINE BESYLATE 10 MILLIGRAM(S): 2.5 TABLET ORAL at 10:09

## 2019-01-24 RX ADMIN — SODIUM CHLORIDE 3 MILLILITER(S): 9 INJECTION INTRAMUSCULAR; INTRAVENOUS; SUBCUTANEOUS at 14:17

## 2019-01-24 RX ADMIN — CLOPIDOGREL BISULFATE 75 MILLIGRAM(S): 75 TABLET, FILM COATED ORAL at 11:39

## 2019-01-24 RX ADMIN — CITALOPRAM 20 MILLIGRAM(S): 10 TABLET, FILM COATED ORAL at 11:38

## 2019-01-24 RX ADMIN — SODIUM CHLORIDE 3 MILLILITER(S): 9 INJECTION INTRAMUSCULAR; INTRAVENOUS; SUBCUTANEOUS at 08:05

## 2019-01-24 RX ADMIN — Medication 81 MILLIGRAM(S): at 11:38

## 2019-01-24 NOTE — PROGRESS NOTE ADULT - ASSESSMENT
67 y/o female with pmhx of HTN, HLD, CAD with 6 stents on ASA and Plavix presents to ED c/o CP and SOB x 1 month. CP with exertion and at rest. Non-radiating. Not pleuritic. Described as heaviness and pressure, sometimes has to grab her chest. Pt woke up with 8/10 pain last night, now 3/10. Pt is now S/Pcardiac catheretization.      Problem/Recommendation - 1:  Problem: Chest pain. Recommendation: S/P cardiac cath with no intervention . Doing fine. CTA negative for PE.     Problem/Recommendation - 2:  ·  Problem: CAD (coronary artery disease).  Recommendation: S/P Stents  and now S/P cardiac cath .ASA, Plavix, BB and Statin.      Problem/Recommendation - 3:  ·  Problem: Hypertension.  Recommendation: BP medications with parameters.      Problem/Recommendation - 4:  ·  Problem: Hyperlipidemia.  Recommendation: Statin.      Problem/Recommendation - 5:  ·  Problem: Depression.  Recommendation: Celexa . Stable.     Disposition : DC planning home.

## 2019-01-24 NOTE — DISCHARGE NOTE ADULT - MEDICATION SUMMARY - MEDICATIONS TO TAKE
I will START or STAY ON the medications listed below when I get home from the hospital:    aspirin 81 mg oral delayed release tablet  -- 1 tab(s) by mouth once a day  -- Indication: For Heart    citalopram 20 mg oral tablet  -- 1 tab(s) by mouth once a day  -- Indication: For Depression    atorvastatin 40 mg oral tablet  -- 1 tab(s) by mouth once a day (at bedtime)  -- Indication: For Cholesterol    Diovan  mg-25 mg oral tablet  -- 1 tab(s) by mouth once a day  -- Indication: For HTN    clopidogrel 75 mg oral tablet  -- 1 tab(s) by mouth once a day  -- Indication: For Heart    carvedilol 6.25 mg oral tablet  -- 1 tab(s) by mouth every 12 hours  -- Indication: For Htn    Norvasc 10 mg oral tablet  -- 1 tab(s) by mouth once a day  -- Indication: For Htn

## 2019-01-24 NOTE — DISCHARGE NOTE ADULT - PATIENT PORTAL LINK FT
You can access the [facility] Patient Portal, offered by [facility], by registering with the following website: [email or web address]

## 2019-01-24 NOTE — DISCHARGE NOTE ADULT - CARE PLAN
Principal Discharge DX:	Chest pain  Goal:	Determine cause of chest pain and treat accordingly.  Assessment and plan of treatment:	You had cardiac catheterization that revealed that your cardiac stents were patent and mild disease was noted. Continue your aspirin and plavix as instructed.  Secondary Diagnosis:	CAD (coronary artery disease)  Goal:	To be asymptomatic, to reduce risks factors such as hypertension, diabetes and hyperlipidemia to lower the risk of blood clots formation; and to prevent complications of coronary artery disease such as worsening chest pain, heart attack and death.  Assessment and plan of treatment:	Continue a low salt, fat, and cholesterol diet. Follow up with cardiologist and primary care physician's routine appointment.  Secondary Diagnosis:	Hypertension  Goal:	To maintain a normal blood pressure to prevent heart attack, stroke and renal failure.  Assessment and plan of treatment:	Low sodium and fat diet, continue your blood pressure medications, and follow up with primary care physician.  Secondary Diagnosis:	Hyperlipidemia  Goal:	To maintain normal cholesterol levels to prevent stroke, coronary artery disease, peripheral vascular disease and heart attacks.  Assessment and plan of treatment:	Adhere to a low fat diet, exercise daily and continue your cholesterol medications. Follow up with primary care physician and cardiologist for management.  Secondary Diagnosis:	Depression  Goal:	To alleviate depressed mood and return to previous level of normal functioning.  Assessment and plan of treatment:	Continue celexa.

## 2019-01-24 NOTE — DISCHARGE NOTE ADULT - CARE PROVIDER_API CALL
Becca Pressley), Cardiovascular Disease; Internal Medicine  [address] [facility]  [address]  [place] [address]  Phone: [phone]  Fax: [phone]    Elaine Meyers), Internal Medicine  [address] [facility]  [address]  [place] [address]  Phone: [phone]  Fax: [phone]

## 2019-01-24 NOTE — DISCHARGE NOTE ADULT - ADDITIONAL INSTRUCTIONS
No heavy lifting greater than 5-10 pounds with right wrist x one week. No strenuous activity x 3 weeks. Monitor site of procedure and notify your doctor for any redness, swelling, discharge.

## 2019-01-24 NOTE — DISCHARGE NOTE ADULT - PLAN OF CARE
Determine cause of chest pain and treat accordingly. You had cardiac catheterization that revealed that your cardiac stents were patent and mild disease was noted. Continue your aspirin and plavix as instructed. To be asymptomatic, to reduce risks factors such as hypertension, diabetes and hyperlipidemia to lower the risk of blood clots formation; and to prevent complications of coronary artery disease such as worsening chest pain, heart attack and death. Continue a low salt, fat, and cholesterol diet. Follow up with cardiologist and primary care physician's routine appointment. To maintain a normal blood pressure to prevent heart attack, stroke and renal failure. Low sodium and fat diet, continue your blood pressure medications, and follow up with primary care physician. To maintain normal cholesterol levels to prevent stroke, coronary artery disease, peripheral vascular disease and heart attacks. Adhere to a low fat diet, exercise daily and continue your cholesterol medications. Follow up with primary care physician and cardiologist for management. To alleviate depressed mood and return to previous level of normal functioning. Continue celexa.

## 2019-01-24 NOTE — DISCHARGE NOTE ADULT - HOSPITAL COURSE
HPI:  67 y/o female with pmhx of HTN, HLD, CAD with 6 stents on ASA and Plavix presents to ED c/o CP and SOB x 1 month. CP with exertion and at rest. Non-radiating. Not pleuritic. Described as heaviness and pressure, sometimes has to grab her chest. Pt woke up with 8/10 pain last night, now 3/10. Pt is now admitted for cardiac catheretization. (23 Jan 2019 19:36)    Hospital Course  Patient admitted to telemetry. hsT 9-->8. EKG: Sinus bradycardia, Rate: 57bpm, No ST/T wave changes. CXR: Normal chest. Left heart cardiac catheterization performed and noted LAD mild dz, LCx patent stent, mRCA 40%, RRA accessed. Patient remained hemodynamically stable throughout admission and deemed appropriate for discharge. HPI:  69 y/o female with pmhx of HTN, HLD, CAD with 6 stents on ASA and Plavix presents to ED c/o CP and SOB x 1 month. CP with exertion and at rest. Non-radiating. Not pleuritic. Described as heaviness and pressure, sometimes has to grab her chest. Pt woke up with 8/10 pain last night, now 3/10. Pt is now admitted for cardiac catheretization. (23 Jan 2019 19:36)    Hospital Course  Patient admitted to telemetry. hsT 9-->8. EKG: Sinus bradycardia, Rate: 57bpm, No ST/T wave changes. CXR: Normal chest. Left heart cardiac catheterization performed and noted LAD mild dz, LCx patent stent, mRCA 40%, RRA accessed. Patient remained hemodynamically stable throughout admission and deemed appropriate for discharge on 1/24/19. HPI:  67 y/o female with pmhx of HTN, HLD, CAD with 6 stents on ASA and Plavix presents to ED c/o CP and SOB x 1 month. CP with exertion and at rest. Non-radiating. Not pleuritic. Described as heaviness and pressure, sometimes has to grab her chest. Pt woke up with 8/10 pain last night, now 3/10. Pt is now admitted for cardiac catheretization. (23 Jan 2019 19:36)    Hospital Course  Patient admitted to telemetry. hsT 9-->8. EKG: Sinus bradycardia, Rate: 57bpm, No ST/T wave changes. CXR: Normal chest. Left heart cardiac catheterization performed and noted LAD mild dz, LCx patent stent, mRCA 40%, RRA accessed.  CTPA negative for PE patient stable for discharge to home . Patient remained hemodynamically stable throughout admission and deemed appropriate for discharge on 1/24/19.

## 2019-01-24 NOTE — PROGRESS NOTE ADULT - SUBJECTIVE AND OBJECTIVE BOX
INTERVAL HPI/OVERNIGHT EVENTS: NO new concerns.   Vital Signs Last 24 Hrs  T(C): 36.7 (24 Jan 2019 14:38), Max: 36.9 (24 Jan 2019 08:03)  T(F): 98.1 (24 Jan 2019 14:38), Max: 98.4 (24 Jan 2019 08:03)  HR: 56 (24 Jan 2019 14:38) (50 - 56)  BP: 129/60 (24 Jan 2019 14:38) (116/55 - 149/79)  BP(mean): --  RR: 17 (24 Jan 2019 14:38) (17 - 18)  SpO2: 99% (24 Jan 2019 14:38) (97% - 99%)  I&O's Summary    MEDICATIONS  (STANDING):  amLODIPine   Tablet 10 milliGRAM(s) Oral daily  aspirin enteric coated 81 milliGRAM(s) Oral daily  atorvastatin 40 milliGRAM(s) Oral at bedtime  carvedilol 6.25 milliGRAM(s) Oral every 12 hours  citalopram 20 milliGRAM(s) Oral daily  clopidogrel Tablet 75 milliGRAM(s) Oral daily  heparin  Injectable 5000 Unit(s) SubCutaneous every 12 hours  hydrochlorothiazide 25 milliGRAM(s) Oral daily  influenza   Vaccine 0.5 milliLiter(s) IntraMuscular once  sodium chloride 0.9% lock flush 3 milliLiter(s) IV Push every 8 hours  valsartan 320 milliGRAM(s) Oral daily    MEDICATIONS  (PRN):    LABS:                        13.2   8.07  )-----------( 247      ( 24 Jan 2019 10:45 )             38.7     01-24    138  |  101  |  15  ----------------------------<  148<H>  3.9   |  24  |  0.59    Ca    9.3      24 Jan 2019 10:45    TPro  7.3  /  Alb  4.2  /  TBili  0.5  /  DBili  x   /  AST  22  /  ALT  20  /  AlkPhos  95  01-23    PT/INR - ( 23 Jan 2019 12:00 )   PT: 11.1 SEC;   INR: 1.00          PTT - ( 23 Jan 2019 12:00 )  PTT:30.7 SEC    CAPILLARY BLOOD GLUCOSE              REVIEW OF SYSTEMS:  CONSTITUTIONAL: No fever, weight loss, or fatigue  EYES: No eye pain, visual disturbances, or discharge  ENMT:  No difficulty hearing, tinnitus, vertigo; No sinus or throat pain  NECK: No pain or stiffness  RESPIRATORY: No cough, wheezing, chills or hemoptysis; No shortness of breath  CARDIOVASCULAR: No chest pain, palpitations, dizziness, or leg swelling  GASTROINTESTINAL: No abdominal or epigastric pain. No nausea, vomiting, or hematemesis; No diarrhea or constipation. No melena or hematochezia.  GENITOURINARY: No dysuria, frequency, hematuria, or incontinence  NEUROLOGICAL: No headaches, memory loss, loss of strength, numbness, or tremors  SKIN: No itching, burning, rashes, or lesions       Consultant(s) Notes Reviewed:  [x ] YES  [ ] NO    PHYSICAL EXAM:  GENERAL: NAD, well-groomed, well-developed, not in any distress ,  HEAD:  Atraumatic, Normocephalic  EYES: EOMI, PERRLA, conjunctiva and sclera clear  NECK: Supple, No JVD, Normal thyroid  NERVOUS SYSTEM:  Alert & Oriented X3, No focal deficit   CHEST/LUNG: Good air entry bilateral with no  rales, rhonchi, wheezing, or rubs  HEART: Regular rate and rhythm; No murmurs, rubs, or gallops  ABDOMEN: Soft, Nontender, Nondistended; Bowel sounds present  EXTREMITIES:  2+ Peripheral Pulses, No clubbing, cyanosis, or edema      Care Discussed with Consultants/Other Providers [ x] YES  [ ] NO

## 2019-01-24 NOTE — PROGRESS NOTE ADULT - SUBJECTIVE AND OBJECTIVE BOX
Subjective: no chest pain or sob   	  MEDICATIONS:  MEDICATIONS  (STANDING):  amLODIPine   Tablet 10 milliGRAM(s) Oral daily  aspirin enteric coated 81 milliGRAM(s) Oral daily  atorvastatin 40 milliGRAM(s) Oral at bedtime  carvedilol 6.25 milliGRAM(s) Oral every 12 hours  citalopram 20 milliGRAM(s) Oral daily  clopidogrel Tablet 75 milliGRAM(s) Oral daily  heparin  Injectable 5000 Unit(s) SubCutaneous every 12 hours  hydrochlorothiazide 25 milliGRAM(s) Oral daily  influenza   Vaccine 0.5 milliLiter(s) IntraMuscular once  sodium chloride 0.9% lock flush 3 milliLiter(s) IV Push every 8 hours  valsartan 320 milliGRAM(s) Oral daily      LABS:	 	    CARDIAC MARKERS:                                13.7   8.28  )-----------( 277      ( 23 Jan 2019 12:00 )             40.4     01-23    137  |  99  |  11  ----------------------------<  90  4.1   |  22  |  0.63    Ca    9.1      23 Jan 2019 12:00    TPro  7.3  /  Alb  4.2  /  TBili  0.5  /  DBili  x   /  AST  22  /  ALT  20  /  AlkPhos  95  01-23    proBNP: Serum Pro-Brain Natriuretic Peptide: 114.2 pg/mL (01-23 @ 13:20)  Serum Pro-Brain Natriuretic Peptide: 117.9 pg/mL (01-23 @ 12:00)    Lipid Profile:   HgA1c:   TSH:       PHYSICAL EXAM:  T(C): 36.9 (01-24-19 @ 08:03), Max: 36.9 (01-24-19 @ 08:03)  HR: 50 (01-24-19 @ 08:03) (50 - 61)  BP: 147/74 (01-24-19 @ 08:03) (128/74 - 149/79)  RR: 18 (01-24-19 @ 08:03) (16 - 19)  SpO2: 98% (01-24-19 @ 08:03) (97% - 100%)  Wt(kg): --  I&O's Summary    Height (cm): 170.18 (01-23 @ 21:00)  Weight (kg): 82 (01-23 @ 21:00)  BMI (kg/m2): 28.3 (01-23 @ 21:00)  BSA (m2): 1.94 (01-23 @ 21:00)    	  Lymphatic: No lymphadenopathy , no edema  Cardiovascular: Normal S1 S2, No JVD, No murmurs , Peripheral pulses palpable 2+ bilaterally  Respiratory: Lungs clear to auscultation, normal effort 	  Gastrointestinal:  Soft, Non-tender, + BS	  Skin: No rashes, No ecchymoses, No cyanosis, warm to touch      TELEMETRY: SR    ECG:  	  RADIOLOGY:   DIAGNOSTIC TESTING:  [ ] Echocardiogram:  [ ]  Catheterization: < from: Cardiac Cath Lab - Adult (01.23.19 @ 15:41) >  CORONARY VESSELS: The coronary circulation is right dominant.  LM:   --  LM: Normal.  LAD:   --  Proximal LAD: There was a diffuse 30 % stenosis.  --  Mid LAD: Angiography showed minor luminal irregularities with no flow  limiting lesions.  --  Distal LAD: Angiography showed minor luminal irregularities with no  flow limiting lesions.  --  D1: Angiography showed minor luminal irregularities with no flow  limiting lesions.  CX:   --  Circumflex: Angiography showed minor luminal irregularities with  no flow limiting lesions.  --  Proximal circumflex: There was no significant restenosis in proximal Cx  stent.  --  OM1: Angiography showed minor luminal irregularities with no flow  limiting lesions.  --  OM2: Angiography showed minor luminal irregularities with no flow  limiting lesions.  RCA:   --  Proximal RCA: Angiography showed minor luminal irregularities  with no flow limiting lesions. There was no significant restenosis in  proximal RCA stent.  --  Mid RCA: There was a kyfowudt21 % stenosis at the site of a prior  stent.  --  Distal RCA: Angiography showed mild atherosclerosis with no flow  limiting lesions.  --  RPDA: Angiography showed mild atherosclerosis with no flow limiting  lesions.  --  RPLS: Angiography showed minor luminal irregularities with no flow  limiting lesions.  COMPLICATIONS: There were no complications.  DIAGNOSTIC RECOMMENDATIONS: Coronary angiogram demonstrates patent stents  and mild nonobstructive CAD. Medical management is recommended.  Prepared and signed by  Payam Urbano M.D.    < end of copied text >    [ ] Stress Test:    OTHER: 	      ASSESSMENT/PLAN: 	68y Female with history of HTN, CAD s/p LCx stent in July of 2018 s/p stent to RCA admitted with chest pain.   -Cath performed showing patent stents and mild non-obstructive cad  -medical management of cad recommended  -no further cardiac workup needed  -check labs today; if cr normal, check ctpa to rule out pe  -dc planning if above studies normal    Payam Urbano MD

## 2019-01-24 NOTE — DISCHARGE NOTE ADULT - NS AS ACTIVITY OBS
No heavy lifting greater than 5-10 pounds with right wrist x one week. No strenuous activity x 3 weeks. Monitor site of procedure and notify your doctor for any redness, swelling, discharge

## 2019-03-07 ENCOUNTER — OUTPATIENT (OUTPATIENT)
Dept: OUTPATIENT SERVICES | Facility: HOSPITAL | Age: 69
LOS: 1 days | End: 2019-03-07
Payer: COMMERCIAL

## 2019-03-07 VITALS
WEIGHT: 182.98 LBS | RESPIRATION RATE: 18 BRPM | HEART RATE: 50 BPM | OXYGEN SATURATION: 97 % | DIASTOLIC BLOOD PRESSURE: 81 MMHG | HEIGHT: 67.32 IN | SYSTOLIC BLOOD PRESSURE: 165 MMHG | TEMPERATURE: 98 F

## 2019-03-07 DIAGNOSIS — Z98.890 OTHER SPECIFIED POSTPROCEDURAL STATES: Chronic | ICD-10-CM

## 2019-03-07 DIAGNOSIS — I10 ESSENTIAL (PRIMARY) HYPERTENSION: ICD-10-CM

## 2019-03-07 DIAGNOSIS — M20.41 OTHER HAMMER TOE(S) (ACQUIRED), RIGHT FOOT: ICD-10-CM

## 2019-03-07 DIAGNOSIS — M79.673 PAIN IN UNSPECIFIED FOOT: ICD-10-CM

## 2019-03-07 DIAGNOSIS — T84.84XD PAIN DUE TO INTERNAL ORTHOPEDIC PROSTHETIC DEVICES, IMPLANTS AND GRAFTS, SUBSEQUENT ENCOUNTER: ICD-10-CM

## 2019-03-07 DIAGNOSIS — Z95.5 PRESENCE OF CORONARY ANGIOPLASTY IMPLANT AND GRAFT: Chronic | ICD-10-CM

## 2019-03-07 DIAGNOSIS — Z01.818 ENCOUNTER FOR OTHER PREPROCEDURAL EXAMINATION: ICD-10-CM

## 2019-03-07 DIAGNOSIS — G56.01 CARPAL TUNNEL SYNDROME, RIGHT UPPER LIMB: Chronic | ICD-10-CM

## 2019-03-07 DIAGNOSIS — S91.104D UNSPECIFIED OPEN WOUND OF RIGHT LESSER TOE(S) WITHOUT DAMAGE TO NAIL, SUBSEQUENT ENCOUNTER: ICD-10-CM

## 2019-03-07 DIAGNOSIS — I25.10 ATHEROSCLEROTIC HEART DISEASE OF NATIVE CORONARY ARTERY WITHOUT ANGINA PECTORIS: ICD-10-CM

## 2019-03-07 DIAGNOSIS — L03.031 CELLULITIS OF RIGHT TOE: ICD-10-CM

## 2019-03-07 PROCEDURE — G0463: CPT

## 2019-03-07 NOTE — H&P PST ADULT - PSH
Carpal tunnel syndrome, bilateral  b/l wrist surgery for carpal tunnel syndrome   delivery delivered  x2  S/P foot surgery, right  Bunionectomy and hammer toe with multiple revisions  Stented coronary artery  x 6 most recent 18

## 2019-03-07 NOTE — H&P PST ADULT - PROBLEM SELECTOR PLAN 2
Recent drug eluding stent placed 7/24/18, pt. instructed by surgeon to remain on daily aspirin and plavix

## 2019-03-07 NOTE — H&P PST ADULT - ATTENDING COMMENTS
Agree with above. Patient was instructed to quit smoking prior to surgery. Potential risks due to smoking discussed including increase risk for complications and decrease healing potential. Patient understands this

## 2019-03-07 NOTE — H&P PST ADULT - HISTORY OF PRESENT ILLNESS
68 year old female with a history of recurrent right foot pain presents for removal of hardware, excision/debridement of wound. Pt. reports having a bunionectomy with complications requiring multiple surgical procedures over the past few years Currently reports experiencing pain when she steps and states her toes are contracted.    Pt. with multiple coronary stents, most recent stent placed 7/24/18, and reports Dr. Julio has instructed her to remain on daily aspirin and plavix.

## 2019-03-07 NOTE — H&P PST ADULT - PMH
Anxiety    Bradycardia    CAD (coronary artery disease)  6 stents, most recent 7/24/18  Depression    GERD (gastroesophageal reflux disease)    Hyperlipidemia    Hypertension    Other chronic osteomyelitis of foot  right  Reflux gastritis    Smoker Anxiety    Bradycardia    CAD (coronary artery disease)  6 stents, most recent 7/24/18  Depression    GERD (gastroesophageal reflux disease)    Hyperlipidemia    Hypertension    Other chronic osteomyelitis of foot  right  Reflux gastritis    Smoker  Current smoker 40 pack years Anxiety    Bradycardia    CAD (coronary artery disease)  6 stents, most recent 7/24/18  Depression    GERD (gastroesophageal reflux disease)    Hyperlipidemia    Hypertension    Macular hole  s/p vitrectomy  Other chronic osteomyelitis of foot  right  Reflux gastritis    Smoker  Current smoker 40 pack years

## 2019-03-07 NOTE — H&P PST ADULT - NEUROLOGICAL DETAILS
alert and oriented x 3/responds to verbal commands/normal strength/responds to pain/sensation intact

## 2019-03-20 ENCOUNTER — TRANSCRIPTION ENCOUNTER (OUTPATIENT)
Age: 69
End: 2019-03-20

## 2019-03-20 RX ORDER — ONDANSETRON 8 MG/1
4 TABLET, FILM COATED ORAL ONCE
Qty: 0 | Refills: 0 | Status: DISCONTINUED | OUTPATIENT
Start: 2019-03-21 | End: 2019-04-05

## 2019-03-20 RX ORDER — OXYCODONE HYDROCHLORIDE 5 MG/1
5 TABLET ORAL ONCE
Qty: 0 | Refills: 0 | Status: DISCONTINUED | OUTPATIENT
Start: 2019-03-21 | End: 2019-03-21

## 2019-03-20 RX ORDER — CELECOXIB 200 MG/1
200 CAPSULE ORAL ONCE
Qty: 0 | Refills: 0 | Status: DISCONTINUED | OUTPATIENT
Start: 2019-03-21 | End: 2019-04-05

## 2019-03-20 RX ORDER — SODIUM CHLORIDE 9 MG/ML
1000 INJECTION, SOLUTION INTRAVENOUS
Qty: 0 | Refills: 0 | Status: DISCONTINUED | OUTPATIENT
Start: 2019-03-21 | End: 2019-04-05

## 2019-03-21 ENCOUNTER — OUTPATIENT (OUTPATIENT)
Dept: OUTPATIENT SERVICES | Facility: HOSPITAL | Age: 69
LOS: 1 days | End: 2019-03-21
Payer: COMMERCIAL

## 2019-03-21 ENCOUNTER — RESULT REVIEW (OUTPATIENT)
Age: 69
End: 2019-03-21

## 2019-03-21 VITALS
TEMPERATURE: 99 F | DIASTOLIC BLOOD PRESSURE: 71 MMHG | HEIGHT: 67 IN | OXYGEN SATURATION: 97 % | RESPIRATION RATE: 16 BRPM | HEART RATE: 49 BPM | WEIGHT: 182.98 LBS | SYSTOLIC BLOOD PRESSURE: 111 MMHG

## 2019-03-21 VITALS
OXYGEN SATURATION: 99 % | SYSTOLIC BLOOD PRESSURE: 126 MMHG | DIASTOLIC BLOOD PRESSURE: 59 MMHG | HEART RATE: 51 BPM | TEMPERATURE: 97 F | RESPIRATION RATE: 14 BRPM

## 2019-03-21 DIAGNOSIS — Z95.5 PRESENCE OF CORONARY ANGIOPLASTY IMPLANT AND GRAFT: Chronic | ICD-10-CM

## 2019-03-21 DIAGNOSIS — T84.84XD PAIN DUE TO INTERNAL ORTHOPEDIC PROSTHETIC DEVICES, IMPLANTS AND GRAFTS, SUBSEQUENT ENCOUNTER: ICD-10-CM

## 2019-03-21 DIAGNOSIS — L03.031 CELLULITIS OF RIGHT TOE: ICD-10-CM

## 2019-03-21 DIAGNOSIS — S91.104D UNSPECIFIED OPEN WOUND OF RIGHT LESSER TOE(S) WITHOUT DAMAGE TO NAIL, SUBSEQUENT ENCOUNTER: ICD-10-CM

## 2019-03-21 DIAGNOSIS — Z01.818 ENCOUNTER FOR OTHER PREPROCEDURAL EXAMINATION: ICD-10-CM

## 2019-03-21 DIAGNOSIS — M20.41 OTHER HAMMER TOE(S) (ACQUIRED), RIGHT FOOT: ICD-10-CM

## 2019-03-21 DIAGNOSIS — G56.01 CARPAL TUNNEL SYNDROME, RIGHT UPPER LIMB: Chronic | ICD-10-CM

## 2019-03-21 DIAGNOSIS — Z98.890 OTHER SPECIFIED POSTPROCEDURAL STATES: Chronic | ICD-10-CM

## 2019-03-21 PROCEDURE — 11042 DBRDMT SUBQ TIS 1ST 20SQCM/<: CPT

## 2019-03-21 PROCEDURE — 20680 REMOVAL OF IMPLANT DEEP: CPT

## 2019-03-21 PROCEDURE — 88300 SURGICAL PATH GROSS: CPT | Mod: 26

## 2019-03-21 PROCEDURE — 88300 SURGICAL PATH GROSS: CPT

## 2019-03-21 RX ORDER — CITALOPRAM 10 MG/1
20 TABLET, FILM COATED ORAL DAILY
Qty: 0 | Refills: 0 | Status: DISCONTINUED | OUTPATIENT
Start: 2019-03-21 | End: 2019-04-05

## 2019-03-21 RX ORDER — ASPIRIN/CALCIUM CARB/MAGNESIUM 324 MG
81 TABLET ORAL DAILY
Qty: 0 | Refills: 0 | Status: DISCONTINUED | OUTPATIENT
Start: 2019-03-21 | End: 2019-04-05

## 2019-03-21 NOTE — ASU PATIENT PROFILE, ADULT - PMH
Anxiety    Bradycardia    CAD (coronary artery disease)  6 stents, most recent 7/24/18  Depression    GERD (gastroesophageal reflux disease)    Hyperlipidemia    Hypertension    Macular hole  s/p vitrectomy  Other chronic osteomyelitis of foot  right  Reflux gastritis    Smoker  Current smoker 40 pack years

## 2019-03-21 NOTE — ASU DISCHARGE PLAN (ADULT/PEDIATRIC) - ASU DC SPECIAL INSTRUCTIONSFT
Weight bearing as tolerated to right foot with post op shoe on at all times  Leave right foot dressing clean, dry, and intact until follow up visit.

## 2019-03-21 NOTE — BRIEF OPERATIVE NOTE - OPERATION/FINDINGS
Pre op Dx: ulcer of right 4th lateral toe with painful, and prominent circlage wire  Post op: same  Procedure: excision of right 4th toe ulcer with removal of circlage wire

## 2019-03-21 NOTE — ASU DISCHARGE PLAN (ADULT/PEDIATRIC) - CALL YOUR DOCTOR IF YOU HAVE ANY OF THE FOLLOWING:
Nausea and vomiting that does not stop/Numbness, tingling, color or temperature change to extremity/Unable to urinate/Inability to tolerate liquids or foods/Wound/Surgical Site with redness, or foul smelling discharge or pus/Bleeding that does not stop/Swelling that gets worse

## 2019-06-02 ENCOUNTER — INPATIENT (INPATIENT)
Facility: HOSPITAL | Age: 69
LOS: 2 days | Discharge: HOME | End: 2019-06-05
Attending: INTERNAL MEDICINE | Admitting: INTERNAL MEDICINE
Payer: COMMERCIAL

## 2019-06-02 VITALS
TEMPERATURE: 99 F | RESPIRATION RATE: 16 BRPM | SYSTOLIC BLOOD PRESSURE: 141 MMHG | OXYGEN SATURATION: 97 % | DIASTOLIC BLOOD PRESSURE: 59 MMHG | HEART RATE: 51 BPM

## 2019-06-02 DIAGNOSIS — R07.9 CHEST PAIN, UNSPECIFIED: ICD-10-CM

## 2019-06-02 DIAGNOSIS — E78.5 HYPERLIPIDEMIA, UNSPECIFIED: ICD-10-CM

## 2019-06-02 DIAGNOSIS — K21.9 GASTRO-ESOPHAGEAL REFLUX DISEASE WITHOUT ESOPHAGITIS: ICD-10-CM

## 2019-06-02 DIAGNOSIS — F32.9 MAJOR DEPRESSIVE DISORDER, SINGLE EPISODE, UNSPECIFIED: ICD-10-CM

## 2019-06-02 DIAGNOSIS — F17.200 NICOTINE DEPENDENCE, UNSPECIFIED, UNCOMPLICATED: ICD-10-CM

## 2019-06-02 DIAGNOSIS — I10 ESSENTIAL (PRIMARY) HYPERTENSION: ICD-10-CM

## 2019-06-02 DIAGNOSIS — G56.01 CARPAL TUNNEL SYNDROME, RIGHT UPPER LIMB: Chronic | ICD-10-CM

## 2019-06-02 DIAGNOSIS — Z95.5 PRESENCE OF CORONARY ANGIOPLASTY IMPLANT AND GRAFT: Chronic | ICD-10-CM

## 2019-06-02 DIAGNOSIS — Z98.890 OTHER SPECIFIED POSTPROCEDURAL STATES: Chronic | ICD-10-CM

## 2019-06-02 LAB
ALBUMIN SERPL ELPH-MCNC: 4.1 G/DL — SIGNIFICANT CHANGE UP (ref 3.3–5)
ALP SERPL-CCNC: 77 U/L — SIGNIFICANT CHANGE UP (ref 40–120)
ALT FLD-CCNC: 21 U/L — SIGNIFICANT CHANGE UP (ref 4–33)
ANION GAP SERPL CALC-SCNC: 13 MMO/L — SIGNIFICANT CHANGE UP (ref 7–14)
APTT BLD: 28.7 SEC — SIGNIFICANT CHANGE UP (ref 27.5–36.3)
AST SERPL-CCNC: 22 U/L — SIGNIFICANT CHANGE UP (ref 4–32)
BASOPHILS # BLD AUTO: 0.04 K/UL — SIGNIFICANT CHANGE UP (ref 0–0.2)
BASOPHILS NFR BLD AUTO: 0.5 % — SIGNIFICANT CHANGE UP (ref 0–2)
BILIRUB SERPL-MCNC: 0.3 MG/DL — SIGNIFICANT CHANGE UP (ref 0.2–1.2)
BUN SERPL-MCNC: 17 MG/DL — SIGNIFICANT CHANGE UP (ref 7–23)
CALCIUM SERPL-MCNC: 9.1 MG/DL — SIGNIFICANT CHANGE UP (ref 8.4–10.5)
CHLORIDE SERPL-SCNC: 100 MMOL/L — SIGNIFICANT CHANGE UP (ref 98–107)
CO2 SERPL-SCNC: 23 MMOL/L — SIGNIFICANT CHANGE UP (ref 22–31)
CREAT SERPL-MCNC: 0.69 MG/DL — SIGNIFICANT CHANGE UP (ref 0.5–1.3)
D DIMER BLD IA.RAPID-MCNC: 266 NG/ML — SIGNIFICANT CHANGE UP
EOSINOPHIL # BLD AUTO: 0.43 K/UL — SIGNIFICANT CHANGE UP (ref 0–0.5)
EOSINOPHIL NFR BLD AUTO: 5.1 % — SIGNIFICANT CHANGE UP (ref 0–6)
GLUCOSE SERPL-MCNC: 91 MG/DL — SIGNIFICANT CHANGE UP (ref 70–99)
HCT VFR BLD CALC: 38.9 % — SIGNIFICANT CHANGE UP (ref 34.5–45)
HGB BLD-MCNC: 12.9 G/DL — SIGNIFICANT CHANGE UP (ref 11.5–15.5)
IMM GRANULOCYTES NFR BLD AUTO: 0.5 % — SIGNIFICANT CHANGE UP (ref 0–1.5)
INR BLD: 1.07 — SIGNIFICANT CHANGE UP (ref 0.88–1.17)
LIDOCAIN IGE QN: 24.2 U/L — SIGNIFICANT CHANGE UP (ref 7–60)
LYMPHOCYTES # BLD AUTO: 2.51 K/UL — SIGNIFICANT CHANGE UP (ref 1–3.3)
LYMPHOCYTES # BLD AUTO: 29.8 % — SIGNIFICANT CHANGE UP (ref 13–44)
MCHC RBC-ENTMCNC: 32.8 PG — SIGNIFICANT CHANGE UP (ref 27–34)
MCHC RBC-ENTMCNC: 33.2 % — SIGNIFICANT CHANGE UP (ref 32–36)
MCV RBC AUTO: 99 FL — SIGNIFICANT CHANGE UP (ref 80–100)
MONOCYTES # BLD AUTO: 0.9 K/UL — SIGNIFICANT CHANGE UP (ref 0–0.9)
MONOCYTES NFR BLD AUTO: 10.7 % — SIGNIFICANT CHANGE UP (ref 2–14)
NEUTROPHILS # BLD AUTO: 4.51 K/UL — SIGNIFICANT CHANGE UP (ref 1.8–7.4)
NEUTROPHILS NFR BLD AUTO: 53.4 % — SIGNIFICANT CHANGE UP (ref 43–77)
NRBC # FLD: 0 K/UL — SIGNIFICANT CHANGE UP (ref 0–0)
NT-PROBNP SERPL-SCNC: 244.9 PG/ML — SIGNIFICANT CHANGE UP
PLATELET # BLD AUTO: 252 K/UL — SIGNIFICANT CHANGE UP (ref 150–400)
PMV BLD: 11.5 FL — SIGNIFICANT CHANGE UP (ref 7–13)
POTASSIUM SERPL-MCNC: 3.8 MMOL/L — SIGNIFICANT CHANGE UP (ref 3.5–5.3)
POTASSIUM SERPL-SCNC: 3.8 MMOL/L — SIGNIFICANT CHANGE UP (ref 3.5–5.3)
PROT SERPL-MCNC: 7.1 G/DL — SIGNIFICANT CHANGE UP (ref 6–8.3)
PROTHROM AB SERPL-ACNC: 11.9 SEC — SIGNIFICANT CHANGE UP (ref 9.8–13.1)
RBC # BLD: 3.93 M/UL — SIGNIFICANT CHANGE UP (ref 3.8–5.2)
RBC # FLD: 12.5 % — SIGNIFICANT CHANGE UP (ref 10.3–14.5)
SODIUM SERPL-SCNC: 136 MMOL/L — SIGNIFICANT CHANGE UP (ref 135–145)
TROPONIN T, HIGH SENSITIVITY: 10 NG/L — SIGNIFICANT CHANGE UP (ref ?–14)
TROPONIN T, HIGH SENSITIVITY: 11 NG/L — SIGNIFICANT CHANGE UP (ref ?–14)
WBC # BLD: 8.43 K/UL — SIGNIFICANT CHANGE UP (ref 3.8–10.5)
WBC # FLD AUTO: 8.43 K/UL — SIGNIFICANT CHANGE UP (ref 3.8–10.5)

## 2019-06-02 PROCEDURE — 71046 X-RAY EXAM CHEST 2 VIEWS: CPT | Mod: 26

## 2019-06-02 RX ORDER — NITROGLYCERIN 6.5 MG
0.4 CAPSULE, EXTENDED RELEASE ORAL ONCE
Refills: 0 | Status: COMPLETED | OUTPATIENT
Start: 2019-06-02 | End: 2019-06-02

## 2019-06-02 RX ORDER — ATORVASTATIN CALCIUM 80 MG/1
40 TABLET, FILM COATED ORAL AT BEDTIME
Refills: 0 | Status: DISCONTINUED | OUTPATIENT
Start: 2019-06-02 | End: 2019-06-05

## 2019-06-02 RX ORDER — CARVEDILOL PHOSPHATE 80 MG/1
6.25 CAPSULE, EXTENDED RELEASE ORAL EVERY 12 HOURS
Refills: 0 | Status: DISCONTINUED | OUTPATIENT
Start: 2019-06-02 | End: 2019-06-02

## 2019-06-02 RX ORDER — CARVEDILOL PHOSPHATE 80 MG/1
12.5 CAPSULE, EXTENDED RELEASE ORAL DAILY
Refills: 0 | Status: DISCONTINUED | OUTPATIENT
Start: 2019-06-03 | End: 2019-06-05

## 2019-06-02 RX ORDER — FAMOTIDINE 10 MG/ML
20 INJECTION INTRAVENOUS
Refills: 0 | Status: DISCONTINUED | OUTPATIENT
Start: 2019-06-02 | End: 2019-06-03

## 2019-06-02 RX ORDER — ASPIRIN/CALCIUM CARB/MAGNESIUM 324 MG
325 TABLET ORAL ONCE
Refills: 0 | Status: COMPLETED | OUTPATIENT
Start: 2019-06-02 | End: 2019-06-02

## 2019-06-02 RX ORDER — CLOPIDOGREL BISULFATE 75 MG/1
75 TABLET, FILM COATED ORAL DAILY
Refills: 0 | Status: DISCONTINUED | OUTPATIENT
Start: 2019-06-02 | End: 2019-06-05

## 2019-06-02 RX ORDER — ASPIRIN/CALCIUM CARB/MAGNESIUM 324 MG
81 TABLET ORAL DAILY
Refills: 0 | Status: DISCONTINUED | OUTPATIENT
Start: 2019-06-03 | End: 2019-06-05

## 2019-06-02 RX ORDER — CITALOPRAM 10 MG/1
20 TABLET, FILM COATED ORAL DAILY
Refills: 0 | Status: DISCONTINUED | OUTPATIENT
Start: 2019-06-02 | End: 2019-06-05

## 2019-06-02 RX ORDER — CARVEDILOL PHOSPHATE 80 MG/1
6.25 CAPSULE, EXTENDED RELEASE ORAL DAILY
Refills: 0 | Status: DISCONTINUED | OUTPATIENT
Start: 2019-06-03 | End: 2019-06-05

## 2019-06-02 RX ORDER — LOSARTAN POTASSIUM 100 MG/1
100 TABLET, FILM COATED ORAL DAILY
Refills: 0 | Status: DISCONTINUED | OUTPATIENT
Start: 2019-06-02 | End: 2019-06-05

## 2019-06-02 RX ORDER — AMLODIPINE BESYLATE 2.5 MG/1
10 TABLET ORAL DAILY
Refills: 0 | Status: DISCONTINUED | OUTPATIENT
Start: 2019-06-02 | End: 2019-06-05

## 2019-06-02 RX ORDER — HYDROCHLOROTHIAZIDE 25 MG
25 TABLET ORAL DAILY
Refills: 0 | Status: DISCONTINUED | OUTPATIENT
Start: 2019-06-02 | End: 2019-06-05

## 2019-06-02 RX ADMIN — Medication 0.4 MILLIGRAM(S): at 18:28

## 2019-06-02 RX ADMIN — Medication 30 MILLILITER(S): at 18:28

## 2019-06-02 RX ADMIN — Medication 325 MILLIGRAM(S): at 18:28

## 2019-06-02 NOTE — H&P ADULT - HISTORY OF PRESENT ILLNESS
This is a 67yo F with  h/o HTN, CAD w/ multiple stents, current smoker, GERD, HLD, presents with Chest pain. Pt states  Initially had episode of cp yesterday that resolved, but woke up this morning with midsternal/ epigastric chest pain, described as squeezing, 8/10, radiates to the back and upwards, + nausea, + SOB, + diaphoresis, no vomiting, has been constant, not worse with exertion. + chronic cough, no fever, worse than prior Chest pain, Pt took 1 asa and her usual dose of Plavix prior to coming to ed. Pain was relieved with Maalox and Nitro given in ED. Currently pt appears comfortable NAD, CP free.

## 2019-06-02 NOTE — H&P ADULT - NSICDXFAMILYHX_GEN_ALL_CORE_FT
FAMILY HISTORY:  Family history of Alzheimer's disease, Father    Sibling  Still living? Yes, Estimated age: Age Unknown  Family history of heart disease, Age at diagnosis: Age Unknown  Family history of prostate cancer, Age at diagnosis: Age Unknown    Grandparent  Still living? No  Family history of breast cancer, Age at diagnosis: Age Unknown  Family history of myocardial infarction, Age at diagnosis: Age Unknown

## 2019-06-02 NOTE — ED ADULT NURSE NOTE - OBJECTIVE STATEMENT
pt alert,oriented x3. reports ch pains since yesterday reports with diff breathing. states pain increases upon lying down,iv access,labs sent,md to evaluate.will continue to monitor

## 2019-06-02 NOTE — H&P ADULT - NSICDXPASTSURGICALHX_GEN_ALL_CORE_FT
PAST SURGICAL HISTORY:  Carpal tunnel syndrome, bilateral b/l wrist surgery for carpal tunnel syndrome     delivery delivered x2    S/P foot surgery, right Bunionectomy and hammer toe with multiple revisions    Stented coronary artery x 6 most recent 18

## 2019-06-02 NOTE — H&P ADULT - ASSESSMENT
67yo F with  h/o HTN, CAD w/ multiple stents, current smoker, GERD, HLD, presents with Chest pain, r/o ACS

## 2019-06-02 NOTE — ED PROVIDER NOTE - OBJECTIVE STATEMENT
69yo F with htn, cad with multiple stents, current smoker, p.w chest pain. Initially had episode of cp yesterday that resolved, but woke up this morning with midsternal chest pain, described as squeezing, 8/10, radiates upwards, + nausea, + SOB, no vomiting, no diaphoresis, has been constant, not worse with exertion. + chronic cough, no fever, worse than prior cp. took 1 asa and her dose of plavix prior to coming to ed

## 2019-06-02 NOTE — PATIENT PROFILE ADULT - NSTOBACCOCESSATIONEDU3_GEN_A_NUR
(0) swallows foods/liquids without difficulty
Learning behavioral activities to cope with urges.  For example, distraction and changing routines

## 2019-06-02 NOTE — ED PROVIDER NOTE - ATTENDING CONTRIBUTION TO CARE
MD Baker:  patient seen and evaluated with the resident.  I was present for key portions of the History & Physical, and I agree with the Impression & Plan.  MD Baker:  69 yo F, c/o CP and SOB x 1day.  Onset: yesterday, but the pain has been crescendoing through until she couldn't bear it.  Quality:  epigastric pressure.  Associated Sx: difficulty breathing.  Worse:  exertion.  Better: rest.  Quality:  like prior ACS.  Context: known CAD w/6stents; last stent 11 months ago.  Last cath 6 months ago - "clean."  Primary cardiologist = Dr. Mendoza.  VS: shade rate, +HTN, afebrile.  Physical Exam: adult F, anxious appearing, NCAT, PERRL, EOMI, neck supple, RRR, CTA B, Abd: s/nd/nt, Ext: no edema no calf pain, Neuro: AAOx3, ambulates w/o diff, strength 5/5 & symmetric throughout.  Impression:  Cp in patient with known ACS.    Plan:  likely to be admitted for serial enzymes.  No CDU beds.  Dr. Mendoza paged to discuss dispo and plan.

## 2019-06-02 NOTE — H&P ADULT - NSHPLABSRESULTS_GEN_ALL_CORE
12.9   8.43  )-----------( 252      ( 02 Jun 2019 18:20 )             38.9   06-02    136  |  100  |  17  ----------------------------<  91  3.8   |  23  |  0.69    Ca    9.1      02 Jun 2019 18:20    TPro  7.1  /  Alb  4.1  /  TBili  0.3  /  DBili  x   /  AST  22  /  ALT  21  /  AlkPhos  77  06-02

## 2019-06-02 NOTE — H&P ADULT - NSICDXPASTMEDICALHX_GEN_ALL_CORE_FT
PAST MEDICAL HISTORY:  Anxiety     Bradycardia     CAD (coronary artery disease) 6 stents, most recent 7/24/18    Depression     GERD (gastroesophageal reflux disease)     Hyperlipidemia     Hypertension     Macular hole s/p vitrectomy    Other chronic osteomyelitis of foot right    Reflux gastritis     Smoker Current smoker 40 pack years

## 2019-06-02 NOTE — ED PROVIDER NOTE - PROGRESS NOTE DETAILS
Cata: spoke with [facility], requesting dimer if rpt trop is negative, will admit to dr taylor

## 2019-06-02 NOTE — ED ADULT NURSE NOTE - NSIMPLEMENTINTERV_GEN_ALL_ED
Implemented All Universal Safety Interventions:  [place] to call system. Call bell, personal items and telephone within reach. Instruct patient to call for assistance. Room bathroom lighting operational. Non-slip footwear when patient is off stretcher. Physically safe environment: no spills, clutter or unnecessary equipment. Stretcher in lowest position, wheels locked, appropriate side rails in place.

## 2019-06-02 NOTE — ED PROVIDER NOTE - CLINICAL SUMMARY MEDICAL DECISION MAKING FREE TEXT BOX
Impression:  Cp in patient with known ACS.    Plan:  likely to be admitted for serial enzymes.  No CDU beds.  Dr. Mendoza paged to discuss dispo and plan.

## 2019-06-02 NOTE — ED ADULT NURSE REASSESSMENT NOTE - NS ED NURSE REASSESS COMMENT FT1
reports decreased pain,still present 6/10,repeat trop sent. remains alert,oriented x3,sleeping at intervals. will continue to monitor

## 2019-06-02 NOTE — H&P ADULT - ATTENDING COMMENTS
Patient seen and examined.  Agree with above.   -Admitted with chest pain and epigastric pain  -CE negative thus far  -check NST  -further workup pending above  -see progress note for full a/p    Payam Urbano MD

## 2019-06-02 NOTE — ED ADULT TRIAGE NOTE - CHIEF COMPLAINT QUOTE
pt having cp since yesterday  states it comes and goes  pt endorses 6 stents   8/10  with some sob painnot going away

## 2019-06-02 NOTE — H&P ADULT - PROBLEM SELECTOR PLAN 1
- h/o CAD w/ stents  - tele  - cardiac eval  - Asa, Plavix, coreg, losartan  - EKG  - ECHO  - possible cardiac cath on monday

## 2019-06-03 DIAGNOSIS — R10.13 EPIGASTRIC PAIN: ICD-10-CM

## 2019-06-03 DIAGNOSIS — Z71.89 OTHER SPECIFIED COUNSELING: ICD-10-CM

## 2019-06-03 PROBLEM — H35.349 MACULAR CYST, HOLE, OR PSEUDOHOLE, UNSPECIFIED EYE: Chronic | Status: ACTIVE | Noted: 2019-03-07

## 2019-06-03 LAB
ANION GAP SERPL CALC-SCNC: 15 MMO/L — HIGH (ref 7–14)
BUN SERPL-MCNC: 15 MG/DL — SIGNIFICANT CHANGE UP (ref 7–23)
CALCIUM SERPL-MCNC: 9.2 MG/DL — SIGNIFICANT CHANGE UP (ref 8.4–10.5)
CHLORIDE SERPL-SCNC: 101 MMOL/L — SIGNIFICANT CHANGE UP (ref 98–107)
CHOLEST SERPL-MCNC: 124 MG/DL — SIGNIFICANT CHANGE UP (ref 120–199)
CO2 SERPL-SCNC: 21 MMOL/L — LOW (ref 22–31)
CREAT SERPL-MCNC: 0.46 MG/DL — LOW (ref 0.5–1.3)
GLUCOSE SERPL-MCNC: 78 MG/DL — SIGNIFICANT CHANGE UP (ref 70–99)
HCT VFR BLD CALC: 39.9 % — SIGNIFICANT CHANGE UP (ref 34.5–45)
HDLC SERPL-MCNC: 54 MG/DL — SIGNIFICANT CHANGE UP (ref 45–65)
HGB BLD-MCNC: 13.3 G/DL — SIGNIFICANT CHANGE UP (ref 11.5–15.5)
LIPID PNL WITH DIRECT LDL SERPL: 67 MG/DL — SIGNIFICANT CHANGE UP
MAGNESIUM SERPL-MCNC: 2.1 MG/DL — SIGNIFICANT CHANGE UP (ref 1.6–2.6)
MCHC RBC-ENTMCNC: 33.3 % — SIGNIFICANT CHANGE UP (ref 32–36)
MCHC RBC-ENTMCNC: 33.3 PG — SIGNIFICANT CHANGE UP (ref 27–34)
MCV RBC AUTO: 100 FL — SIGNIFICANT CHANGE UP (ref 80–100)
NRBC # FLD: 0 K/UL — SIGNIFICANT CHANGE UP (ref 0–0)
PLATELET # BLD AUTO: 231 K/UL — SIGNIFICANT CHANGE UP (ref 150–400)
PMV BLD: 11.6 FL — SIGNIFICANT CHANGE UP (ref 7–13)
POTASSIUM SERPL-MCNC: 4.2 MMOL/L — SIGNIFICANT CHANGE UP (ref 3.5–5.3)
POTASSIUM SERPL-SCNC: 4.2 MMOL/L — SIGNIFICANT CHANGE UP (ref 3.5–5.3)
RBC # BLD: 3.99 M/UL — SIGNIFICANT CHANGE UP (ref 3.8–5.2)
RBC # FLD: 12.7 % — SIGNIFICANT CHANGE UP (ref 10.3–14.5)
SODIUM SERPL-SCNC: 137 MMOL/L — SIGNIFICANT CHANGE UP (ref 135–145)
TRIGL SERPL-MCNC: 48 MG/DL — SIGNIFICANT CHANGE UP (ref 10–149)
TSH SERPL-MCNC: 1.62 UIU/ML — SIGNIFICANT CHANGE UP (ref 0.27–4.2)
WBC # BLD: 7.21 K/UL — SIGNIFICANT CHANGE UP (ref 3.8–10.5)
WBC # FLD AUTO: 7.21 K/UL — SIGNIFICANT CHANGE UP (ref 3.8–10.5)

## 2019-06-03 PROCEDURE — 93306 TTE W/DOPPLER COMPLETE: CPT | Mod: 26

## 2019-06-03 RX ORDER — DOCUSATE SODIUM 100 MG
100 CAPSULE ORAL
Refills: 0 | Status: DISCONTINUED | OUTPATIENT
Start: 2019-06-03 | End: 2019-06-05

## 2019-06-03 RX ORDER — SUCRALFATE 1 G
1 TABLET ORAL EVERY 6 HOURS
Refills: 0 | Status: DISCONTINUED | OUTPATIENT
Start: 2019-06-03 | End: 2019-06-05

## 2019-06-03 RX ORDER — HEPARIN SODIUM 5000 [USP'U]/ML
5000 INJECTION INTRAVENOUS; SUBCUTANEOUS EVERY 12 HOURS
Refills: 0 | Status: DISCONTINUED | OUTPATIENT
Start: 2019-06-03 | End: 2019-06-05

## 2019-06-03 RX ORDER — PANTOPRAZOLE SODIUM 20 MG/1
40 TABLET, DELAYED RELEASE ORAL
Refills: 0 | Status: DISCONTINUED | OUTPATIENT
Start: 2019-06-03 | End: 2019-06-05

## 2019-06-03 RX ORDER — SODIUM CHLORIDE 9 MG/ML
3 INJECTION INTRAMUSCULAR; INTRAVENOUS; SUBCUTANEOUS EVERY 8 HOURS
Refills: 0 | Status: DISCONTINUED | OUTPATIENT
Start: 2019-06-03 | End: 2019-06-05

## 2019-06-03 RX ORDER — PANTOPRAZOLE SODIUM 20 MG/1
40 TABLET, DELAYED RELEASE ORAL
Refills: 0 | Status: DISCONTINUED | OUTPATIENT
Start: 2019-06-03 | End: 2019-06-03

## 2019-06-03 RX ADMIN — SODIUM CHLORIDE 3 MILLILITER(S): 9 INJECTION INTRAMUSCULAR; INTRAVENOUS; SUBCUTANEOUS at 21:32

## 2019-06-03 RX ADMIN — Medication 1 GRAM(S): at 17:23

## 2019-06-03 RX ADMIN — Medication 1 GRAM(S): at 21:33

## 2019-06-03 RX ADMIN — ATORVASTATIN CALCIUM 40 MILLIGRAM(S): 80 TABLET, FILM COATED ORAL at 21:33

## 2019-06-03 RX ADMIN — LOSARTAN POTASSIUM 100 MILLIGRAM(S): 100 TABLET, FILM COATED ORAL at 05:24

## 2019-06-03 RX ADMIN — PANTOPRAZOLE SODIUM 40 MILLIGRAM(S): 20 TABLET, DELAYED RELEASE ORAL at 17:23

## 2019-06-03 RX ADMIN — FAMOTIDINE 20 MILLIGRAM(S): 10 INJECTION INTRAVENOUS at 05:24

## 2019-06-03 RX ADMIN — HEPARIN SODIUM 5000 UNIT(S): 5000 INJECTION INTRAVENOUS; SUBCUTANEOUS at 05:24

## 2019-06-03 RX ADMIN — CITALOPRAM 20 MILLIGRAM(S): 10 TABLET, FILM COATED ORAL at 11:22

## 2019-06-03 RX ADMIN — SODIUM CHLORIDE 3 MILLILITER(S): 9 INJECTION INTRAMUSCULAR; INTRAVENOUS; SUBCUTANEOUS at 13:17

## 2019-06-03 RX ADMIN — CLOPIDOGREL BISULFATE 75 MILLIGRAM(S): 75 TABLET, FILM COATED ORAL at 11:21

## 2019-06-03 RX ADMIN — AMLODIPINE BESYLATE 10 MILLIGRAM(S): 2.5 TABLET ORAL at 05:24

## 2019-06-03 RX ADMIN — SODIUM CHLORIDE 3 MILLILITER(S): 9 INJECTION INTRAMUSCULAR; INTRAVENOUS; SUBCUTANEOUS at 07:13

## 2019-06-03 RX ADMIN — Medication 81 MILLIGRAM(S): at 11:21

## 2019-06-03 RX ADMIN — Medication 25 MILLIGRAM(S): at 05:24

## 2019-06-03 NOTE — CONSULT NOTE ADULT - PROBLEM SELECTOR RECOMMENDATION 9
- r/o PUD vs worsening GERD   - trend h/h  - hgb in normal range  - no overt gi bleeding   - rec protonix 40mg PO BID  - rec carafate 1g PO QID  - maalox prn   - patient would benefit from endoscopic evaluation after completion of cardiac workup - r/o PUD vs worsening GERD   - trend h/h  - hgb in normal range  - no overt gi bleeding   - rec protonix 40mg PO BID  - rec carafate 1g PO QID  - maalox prn   - patient would benefit from endoscopic evaluation after completion of cardiac workup; possibly Wednesday

## 2019-06-04 LAB
ALBUMIN SERPL ELPH-MCNC: 3.8 G/DL — SIGNIFICANT CHANGE UP (ref 3.3–5)
ALP SERPL-CCNC: 74 U/L — SIGNIFICANT CHANGE UP (ref 40–120)
ALT FLD-CCNC: 17 U/L — SIGNIFICANT CHANGE UP (ref 4–33)
ANION GAP SERPL CALC-SCNC: 12 MMO/L — SIGNIFICANT CHANGE UP (ref 7–14)
AST SERPL-CCNC: 21 U/L — SIGNIFICANT CHANGE UP (ref 4–32)
BILIRUB SERPL-MCNC: 0.7 MG/DL — SIGNIFICANT CHANGE UP (ref 0.2–1.2)
BUN SERPL-MCNC: 14 MG/DL — SIGNIFICANT CHANGE UP (ref 7–23)
CALCIUM SERPL-MCNC: 9.5 MG/DL — SIGNIFICANT CHANGE UP (ref 8.4–10.5)
CHLORIDE SERPL-SCNC: 100 MMOL/L — SIGNIFICANT CHANGE UP (ref 98–107)
CO2 SERPL-SCNC: 25 MMOL/L — SIGNIFICANT CHANGE UP (ref 22–31)
CREAT SERPL-MCNC: 0.53 MG/DL — SIGNIFICANT CHANGE UP (ref 0.5–1.3)
GLUCOSE SERPL-MCNC: 102 MG/DL — HIGH (ref 70–99)
HCT VFR BLD CALC: 39.3 % — SIGNIFICANT CHANGE UP (ref 34.5–45)
HCV AB S/CO SERPL IA: 0.14 S/CO — SIGNIFICANT CHANGE UP (ref 0–0.99)
HCV AB SERPL-IMP: SIGNIFICANT CHANGE UP
HGB BLD-MCNC: 13.3 G/DL — SIGNIFICANT CHANGE UP (ref 11.5–15.5)
MAGNESIUM SERPL-MCNC: 1.9 MG/DL — SIGNIFICANT CHANGE UP (ref 1.6–2.6)
MCHC RBC-ENTMCNC: 33.6 PG — SIGNIFICANT CHANGE UP (ref 27–34)
MCHC RBC-ENTMCNC: 33.8 % — SIGNIFICANT CHANGE UP (ref 32–36)
MCV RBC AUTO: 99.2 FL — SIGNIFICANT CHANGE UP (ref 80–100)
NRBC # FLD: 0 K/UL — SIGNIFICANT CHANGE UP (ref 0–0)
PHOSPHATE SERPL-MCNC: 3.5 MG/DL — SIGNIFICANT CHANGE UP (ref 2.5–4.5)
PLATELET # BLD AUTO: 229 K/UL — SIGNIFICANT CHANGE UP (ref 150–400)
PMV BLD: 11.5 FL — SIGNIFICANT CHANGE UP (ref 7–13)
POTASSIUM SERPL-MCNC: 4 MMOL/L — SIGNIFICANT CHANGE UP (ref 3.5–5.3)
POTASSIUM SERPL-SCNC: 4 MMOL/L — SIGNIFICANT CHANGE UP (ref 3.5–5.3)
PROT SERPL-MCNC: 6.8 G/DL — SIGNIFICANT CHANGE UP (ref 6–8.3)
RBC # BLD: 3.96 M/UL — SIGNIFICANT CHANGE UP (ref 3.8–5.2)
RBC # FLD: 12.8 % — SIGNIFICANT CHANGE UP (ref 10.3–14.5)
SODIUM SERPL-SCNC: 137 MMOL/L — SIGNIFICANT CHANGE UP (ref 135–145)
WBC # BLD: 6.16 K/UL — SIGNIFICANT CHANGE UP (ref 3.8–10.5)
WBC # FLD AUTO: 6.16 K/UL — SIGNIFICANT CHANGE UP (ref 3.8–10.5)

## 2019-06-04 PROCEDURE — 93016 CV STRESS TEST SUPVJ ONLY: CPT | Mod: GC

## 2019-06-04 PROCEDURE — 78452 HT MUSCLE IMAGE SPECT MULT: CPT | Mod: 26

## 2019-06-04 PROCEDURE — 93018 CV STRESS TEST I&R ONLY: CPT | Mod: GC

## 2019-06-04 RX ADMIN — PANTOPRAZOLE SODIUM 40 MILLIGRAM(S): 20 TABLET, DELAYED RELEASE ORAL at 06:01

## 2019-06-04 RX ADMIN — CLOPIDOGREL BISULFATE 75 MILLIGRAM(S): 75 TABLET, FILM COATED ORAL at 11:48

## 2019-06-04 RX ADMIN — Medication 81 MILLIGRAM(S): at 11:48

## 2019-06-04 RX ADMIN — LOSARTAN POTASSIUM 100 MILLIGRAM(S): 100 TABLET, FILM COATED ORAL at 06:01

## 2019-06-04 RX ADMIN — Medication 1 GRAM(S): at 11:48

## 2019-06-04 RX ADMIN — Medication 1 GRAM(S): at 21:29

## 2019-06-04 RX ADMIN — Medication 25 MILLIGRAM(S): at 06:01

## 2019-06-04 RX ADMIN — ATORVASTATIN CALCIUM 40 MILLIGRAM(S): 80 TABLET, FILM COATED ORAL at 21:29

## 2019-06-04 RX ADMIN — SODIUM CHLORIDE 3 MILLILITER(S): 9 INJECTION INTRAMUSCULAR; INTRAVENOUS; SUBCUTANEOUS at 11:50

## 2019-06-04 RX ADMIN — AMLODIPINE BESYLATE 10 MILLIGRAM(S): 2.5 TABLET ORAL at 06:01

## 2019-06-04 RX ADMIN — SODIUM CHLORIDE 3 MILLILITER(S): 9 INJECTION INTRAMUSCULAR; INTRAVENOUS; SUBCUTANEOUS at 05:24

## 2019-06-04 RX ADMIN — Medication 1 GRAM(S): at 06:01

## 2019-06-04 RX ADMIN — SODIUM CHLORIDE 3 MILLILITER(S): 9 INJECTION INTRAMUSCULAR; INTRAVENOUS; SUBCUTANEOUS at 21:28

## 2019-06-04 RX ADMIN — CITALOPRAM 20 MILLIGRAM(S): 10 TABLET, FILM COATED ORAL at 11:50

## 2019-06-04 NOTE — PROGRESS NOTE ADULT - ATTENDING COMMENTS
Patient seen and examined.  Agree with above.   -NST results noted  -plan for cardiac cath today to rule out progression of cad  -tobacco cessation counseling provided  -pt. currently with no hypoxia or dyspnea - pulmonary consultation with Dr. Burgess called for elevated d-dimer    Payam Urbano MD

## 2019-06-04 NOTE — CHART NOTE - NSCHARTNOTEFT_GEN_A_CORE
Pt is s/p 6/4/19 Cardiac cath - RCA 50%, LAD 30%, RRA access.  Denies nigel CP or SOB or RRA site discomfort  However, states she has right knee pain and it hurts when she walks    ICU Vital Signs Last 24 Hrs  T(C): 36.6 (04 Jun 2019 21:27), Max: 36.6 (04 Jun 2019 05:37)  T(F): 97.9 (04 Jun 2019 21:27), Max: 97.9 (04 Jun 2019 21:27)  HR: 58 (04 Jun 2019 21:27) (47 - 58)  BP: 137/64 (04 Jun 2019 21:27) (137/64 - 155/98)  BP(mean): --  ABP: --  ABP(mean): --  RR: 18 (04 Jun 2019 21:27) (17 - 18)  SpO2: 97% (04 Jun 2019 21:27) (97% - 98%)      RRA site with clean, dry bandage in place, good finger sensation and hand grasp  Right knee does not appear swollen  No signs of trauma  No calf tenderness  She walks tentatively on the right foot because her knee hurts    A/P:  Tylenol for knee pain  Xrays of right knee can be done if discomfort persists

## 2019-06-05 ENCOUNTER — TRANSCRIPTION ENCOUNTER (OUTPATIENT)
Age: 69
End: 2019-06-05

## 2019-06-05 VITALS
RESPIRATION RATE: 17 BRPM | DIASTOLIC BLOOD PRESSURE: 83 MMHG | HEART RATE: 51 BPM | OXYGEN SATURATION: 100 % | TEMPERATURE: 98 F | SYSTOLIC BLOOD PRESSURE: 163 MMHG

## 2019-06-05 DIAGNOSIS — J44.9 CHRONIC OBSTRUCTIVE PULMONARY DISEASE, UNSPECIFIED: ICD-10-CM

## 2019-06-05 LAB
ANION GAP SERPL CALC-SCNC: 14 MMO/L — SIGNIFICANT CHANGE UP (ref 7–14)
BUN SERPL-MCNC: 14 MG/DL — SIGNIFICANT CHANGE UP (ref 7–23)
CALCIUM SERPL-MCNC: 9.7 MG/DL — SIGNIFICANT CHANGE UP (ref 8.4–10.5)
CHLORIDE SERPL-SCNC: 100 MMOL/L — SIGNIFICANT CHANGE UP (ref 98–107)
CO2 SERPL-SCNC: 25 MMOL/L — SIGNIFICANT CHANGE UP (ref 22–31)
CREAT SERPL-MCNC: 0.58 MG/DL — SIGNIFICANT CHANGE UP (ref 0.5–1.3)
GLUCOSE SERPL-MCNC: 116 MG/DL — HIGH (ref 70–99)
HCT VFR BLD CALC: 39.8 % — SIGNIFICANT CHANGE UP (ref 34.5–45)
HGB BLD-MCNC: 13.1 G/DL — SIGNIFICANT CHANGE UP (ref 11.5–15.5)
MAGNESIUM SERPL-MCNC: 1.8 MG/DL — SIGNIFICANT CHANGE UP (ref 1.6–2.6)
MCHC RBC-ENTMCNC: 32.9 % — SIGNIFICANT CHANGE UP (ref 32–36)
MCHC RBC-ENTMCNC: 33.3 PG — SIGNIFICANT CHANGE UP (ref 27–34)
MCV RBC AUTO: 101.3 FL — HIGH (ref 80–100)
NRBC # FLD: 0 K/UL — SIGNIFICANT CHANGE UP (ref 0–0)
PLATELET # BLD AUTO: 235 K/UL — SIGNIFICANT CHANGE UP (ref 150–400)
PMV BLD: 11.9 FL — SIGNIFICANT CHANGE UP (ref 7–13)
POTASSIUM SERPL-MCNC: 4.2 MMOL/L — SIGNIFICANT CHANGE UP (ref 3.5–5.3)
POTASSIUM SERPL-SCNC: 4.2 MMOL/L — SIGNIFICANT CHANGE UP (ref 3.5–5.3)
RBC # BLD: 3.93 M/UL — SIGNIFICANT CHANGE UP (ref 3.8–5.2)
RBC # FLD: 12.7 % — SIGNIFICANT CHANGE UP (ref 10.3–14.5)
SODIUM SERPL-SCNC: 139 MMOL/L — SIGNIFICANT CHANGE UP (ref 135–145)
WBC # BLD: 6.42 K/UL — SIGNIFICANT CHANGE UP (ref 3.8–10.5)
WBC # FLD AUTO: 6.42 K/UL — SIGNIFICANT CHANGE UP (ref 3.8–10.5)

## 2019-06-05 PROCEDURE — 93970 EXTREMITY STUDY: CPT | Mod: 26

## 2019-06-05 PROCEDURE — 73562 X-RAY EXAM OF KNEE 3: CPT | Mod: 26,RT

## 2019-06-05 PROCEDURE — 71275 CT ANGIOGRAPHY CHEST: CPT | Mod: 26

## 2019-06-05 RX ORDER — ACETAMINOPHEN 500 MG
650 TABLET ORAL ONCE
Refills: 0 | Status: COMPLETED | OUTPATIENT
Start: 2019-06-05 | End: 2019-06-05

## 2019-06-05 RX ORDER — DOCUSATE SODIUM 100 MG
1 CAPSULE ORAL
Qty: 60 | Refills: 0
Start: 2019-06-05 | End: 2019-07-04

## 2019-06-05 RX ORDER — BUDESONIDE AND FORMOTEROL FUMARATE DIHYDRATE 160; 4.5 UG/1; UG/1
2 AEROSOL RESPIRATORY (INHALATION)
Refills: 0 | Status: DISCONTINUED | OUTPATIENT
Start: 2019-06-05 | End: 2019-06-05

## 2019-06-05 RX ORDER — PANTOPRAZOLE SODIUM 20 MG/1
1 TABLET, DELAYED RELEASE ORAL
Qty: 60 | Refills: 0
Start: 2019-06-05 | End: 2019-07-04

## 2019-06-05 RX ORDER — ACETAMINOPHEN 500 MG
650 TABLET ORAL EVERY 6 HOURS
Refills: 0 | Status: DISCONTINUED | OUTPATIENT
Start: 2019-06-05 | End: 2019-06-05

## 2019-06-05 RX ADMIN — Medication 650 MILLIGRAM(S): at 18:04

## 2019-06-05 RX ADMIN — CLOPIDOGREL BISULFATE 75 MILLIGRAM(S): 75 TABLET, FILM COATED ORAL at 13:18

## 2019-06-05 RX ADMIN — Medication 650 MILLIGRAM(S): at 01:37

## 2019-06-05 RX ADMIN — CITALOPRAM 20 MILLIGRAM(S): 10 TABLET, FILM COATED ORAL at 13:18

## 2019-06-05 RX ADMIN — AMLODIPINE BESYLATE 10 MILLIGRAM(S): 2.5 TABLET ORAL at 13:18

## 2019-06-05 RX ADMIN — SODIUM CHLORIDE 3 MILLILITER(S): 9 INJECTION INTRAMUSCULAR; INTRAVENOUS; SUBCUTANEOUS at 13:20

## 2019-06-05 RX ADMIN — LOSARTAN POTASSIUM 100 MILLIGRAM(S): 100 TABLET, FILM COATED ORAL at 13:20

## 2019-06-05 RX ADMIN — SODIUM CHLORIDE 3 MILLILITER(S): 9 INJECTION INTRAMUSCULAR; INTRAVENOUS; SUBCUTANEOUS at 04:03

## 2019-06-05 RX ADMIN — PANTOPRAZOLE SODIUM 40 MILLIGRAM(S): 20 TABLET, DELAYED RELEASE ORAL at 17:02

## 2019-06-05 RX ADMIN — Medication 81 MILLIGRAM(S): at 13:18

## 2019-06-05 RX ADMIN — Medication 650 MILLIGRAM(S): at 02:37

## 2019-06-05 NOTE — DISCHARGE NOTE PROVIDER - CARE PROVIDER_API CALL
Kirk Khan)  Cardiovascular Disease  [address], [address]  [place] [address]  Phone: [phone]  Fax: [phone]  Follow Up Time:     Marcos Burgess)  Critical Care Medicine; Internal Medicine; Pulmonary Disease  [address] [place] [address]  Phone: [phone]  Fax: [phone]  Follow Up Time:     Appointment: Cardio,   Follow up with Dr. Mendoza 6/10 at 1145 as scheduled  Phone: (   )    -  Fax: (   )    -  Follow Up Time:

## 2019-06-05 NOTE — CONSULT NOTE ADULT - PROBLEM SELECTOR RECOMMENDATION 2
- h/o CAD with last stent 7/2018 on Plavix   - cardiac workup in progress  - TTE pending   - NST 6/4
for cta now: s/p cath

## 2019-06-05 NOTE — DISCHARGE NOTE NURSING/CASE MANAGEMENT/SOCIAL WORK - NSDCPEWEB_GEN_ALL_CORE
[place] website --- [email or web address] [email or web address] Tobacco Control website --- [email or web address]

## 2019-06-05 NOTE — DISCHARGE NOTE PROVIDER - PROVIDER TOKENS
PROVIDER:[TOKEN:[[id number]]],PROVIDER:[TOKEN:[[id number]]],FREE:[LAST:[Appointment: Cardio],PHONE:[(   )    -],FAX:[(   )    -],ADDRESS:[Follow up with Dr. Mendoza 6/10 at 1145 as scheduled]]

## 2019-06-05 NOTE — DISCHARGE NOTE NURSING/CASE MANAGEMENT/SOCIAL WORK - NSDCPEEMAIL_GEN_ALL_CORE
[facility] for Tobacco Control email [email or web address]

## 2019-06-05 NOTE — PROGRESS NOTE ADULT - PROBLEM SELECTOR PLAN 3
- Advanced care planning was discussed with patient and family.  Advanced care planning forms were reviewed and discussed.  Risks, benefits and alternatives of gastroenterologic procedures were discussed in detail and all questions were answered. 30 minutes spent.
- Advanced care planning was discussed with patient and family.  Advanced care planning forms were reviewed and discussed.  Risks, benefits and alternatives of gastroenterologic procedures were discussed in detail and all questions were answered. 30 minutes spent.

## 2019-06-05 NOTE — PROGRESS NOTE ADULT - ATTENDING COMMENTS
Patient seen and examined.  Agree with above.   -Medical management of cad recommended  -ctpa negative for pe  -dc home  -outpatient egd per joshua Urbano MD

## 2019-06-05 NOTE — DISCHARGE NOTE PROVIDER - HOSPITAL COURSE
69 YO F with  h/o HTN, CAD w/ multiple stents, current smoker, GERD, HLD, Anxiety/Depression (worse since passing of her Son in Dec 2018), w/ multiple stents (most recent 7/2018 on Plavix), Diverticulosis, presents with chest pain             Problem/Recommendation - 1:    Problem: COPD (chronic obstructive pulmonary disease). Recommendation: She likely has copd: Awaiting cta: Her last cta in January was negative: she coughs in the morning, she has JAVED and smokes and at times wheezes: Clinical copd: start Symbicort as well as duoneb prn.        Problem/Recommendation - 2:    ·  Problem: Chest pain.  Recommendation: for cta now: s/p cath.     -ACS ruled out    -TTE with preserved LV function    -Abnormal Stress Test s/p LHC on 6/4/19    -CTPA with no PE    -B/L LE US: negative for DVT     -Right Knee XR: negative      -Follow up with Lyandres 6/10 at 1145 as scheduled        Problem/Recommendation - 3:    ·  Problem: Hyperlipidemia.  Recommendation: statins!         Problem/Recommendation - 4:    ·  Problem: GERD (gastroesophageal reflux disease).  Recommendation: PPI.     Epigastric pain. r/o PUD vs worsening GERD     -NO GI bleed     -Protonix 40mg PO BID    -Will plan for outpatient Endoscopy         Problem/Recommendation - 5:    ·  Problem: Hypertension.  Recommendation: Controlled.         Problem/Recommendation - 6:    Problem: Depression.        Problem/Recommendation - 7:    Problem: Smoker. Recommendation: Adv strongly to stop smoking        6/5: Patient stable for discharge as per Cardiology Team, JENI Jon

## 2019-06-05 NOTE — CONSULT NOTE ADULT - ASSESSMENT
67yo F with  h/o HTN, CAD w/ multiple stents, current smoker, GERD, HLD, presents with Chest pain, r/o ACS
67yo F with  h/o HTN, CAD w/ multiple stents, current smoker, GERD, HLD, presents with Chest pain. Pt states  Initially had episode of cp yesterday that resolved, but woke up this morning with midsternal/ epigastric chest pain, described as squeezing, 8/10, radiates to the back and upwards, + nausea, + SOB, + diaphoresis, no vomiting, has been constant, not worse with exertion. + chronic cough, no fever, worse than prior Chest pain, Pt took 1 asa and her usual dose of Plavix prior to coming to ed. Pain was relieved with Maalox and Nitro given in ED. Currently pt appears comfortable NAD, CP free    Problem/Plan - 1:  ·  Problem: Chest pain.  Plan: - h/o CAD w/ stents  Awaiting TTE and NST   Cardiology helping.      Problem/Plan - 2:  ·  Problem: Epigastric pain .  Plan: - GI consult noted .  Possible EGD once cardiology clears .      Problem/Plan - 3:  ·  Problem: GERD (gastroesophageal reflux disease).  Plan: - c/w PPI ( pepcid bid)  - maalox prn  - consider GI eval.      Problem/Plan - 4:  ·  Problem: Hypertension.  Plan: - c/w norvasc, losartan, coreg, HCTZ.      Problem/Plan - 5:  ·  Problem: Depression.  Plan: - c/w citalopram.      Problem/Plan - 6:  Problem: Smoker. Plan: - smoking cessation  - consider nicotine patch.
67yo F with  h/o Anxiety/Depression (worse since passing of her Son in Dec 2018), HTN, CAD w/ multiple stents (most recent 7/2018 on Plavix), current smoker, GERD (multiple EGDs in past), Diverticulosis and HLD, presents with Chest pain

## 2019-06-05 NOTE — DISCHARGE NOTE NURSING/CASE MANAGEMENT/SOCIAL WORK - NSDCDPATPORTLINK_GEN_ALL_CORE
You can access the [facility] Patient Portal, offered by [facility], by registering with the following website: [email or web address]

## 2019-06-05 NOTE — PROGRESS NOTE ADULT - PROBLEM SELECTOR PLAN 1
- r/o PUD vs worsening GERD   - trend h/h  - hgb in normal range  - no overt gi bleeding   - cont protonix 40mg PO BID  - maalox prn   - will plan for outpatient Endoscopy after cardiac issues resolve
- r/o PUD vs worsening GERD   - trend h/h  - hgb in normal range  - no overt gi bleeding   - rec protonix 40mg PO BID  - rec carafate 1g PO QID  - maalox prn   - pending outcome of NST, will plan for possible EGD tomorrow; npo p mn

## 2019-06-05 NOTE — PROGRESS NOTE ADULT - PROBLEM SELECTOR PLAN 2
- h/o CAD with last stent 7/2018 on Plavix   - cardiac workup in progress  - TTE reviewed  - NST 6/4 noted; s/p cath without interventions   - cont to follow cardio recs
- h/o CAD with last stent 7/2018 on Plavix   - cardiac workup in progress  - TTE reviewed  - NST 6/4 pending   - fu cardio recs

## 2019-06-05 NOTE — CONSULT NOTE ADULT - PROBLEM SELECTOR RECOMMENDATION 9
She likely has copd: Awaiting cta: Her last cta in January was negative: she coughs in the morning, she has JAVED and smokes and at times wheezes: Clinical copd: start Symbicort as well as duoneb prn

## 2019-06-05 NOTE — PROGRESS NOTE ADULT - SUBJECTIVE AND OBJECTIVE BOX
INTERVAL HPI/OVERNIGHT EVENTS:    c/o lower extremity pain overnight into this morning  no abd pain this am; no n/v      MEDICATIONS  (STANDING):  amLODIPine   Tablet 10 milliGRAM(s) Oral daily  aspirin enteric coated 81 milliGRAM(s) Oral daily  atorvastatin 40 milliGRAM(s) Oral at bedtime  carvedilol 6.25 milliGRAM(s) Oral daily  carvedilol 12.5 milliGRAM(s) Oral daily  citalopram 20 milliGRAM(s) Oral daily  clopidogrel Tablet 75 milliGRAM(s) Oral daily  docusate sodium 100 milliGRAM(s) Oral two times a day  heparin  Injectable 5000 Unit(s) SubCutaneous every 12 hours  hydrochlorothiazide 25 milliGRAM(s) Oral daily  losartan 100 milliGRAM(s) Oral daily  pantoprazole    Tablet 40 milliGRAM(s) Oral two times a day  sodium chloride 0.9% lock flush 3 milliLiter(s) IV Push every 8 hours  sucralfate 1 Gram(s) Oral every 6 hours    MEDICATIONS  (PRN):  aluminum hydroxide/magnesium hydroxide/simethicone Suspension 30 milliLiter(s) Oral every 6 hours PRN Dyspepsia      Allergies    No Known Allergies    Intolerances        Review of Systems:    General:  No wt loss, fevers, chills, night sweats, fatigue   Eyes:  Good vision, no reported pain  ENT:  No sore throat, pain, runny nose, dysphagia  CV:  No pain, palpitations, hypo/hypertension  Resp:  No dyspnea, cough, tachypnea, wheezing  GI:  No pain, No nausea, No vomiting, No diarrhea, No constipation, No weight loss, No fever, No pruritis, No rectal bleeding, No melena, No dysphagia  :  No pain, bleeding, incontinence, nocturia  Muscle:  No pain, weakness  Neuro:  No weakness, tingling, memory problems  Psych:  No fatigue, insomnia, mood problems, depression  Endocrine:  No polyuria, polydypsia, cold/heat intolerance  Heme:  No petechiae, ecchymosis, easy bruisability  Skin:  No rash, tattoos, scars, edema      Vital Signs Last 24 Hrs  T(C): 36.1 (05 Jun 2019 05:22), Max: 36.6 (04 Jun 2019 12:23)  T(F): 97 (05 Jun 2019 05:22), Max: 97.9 (04 Jun 2019 21:27)  HR: 59 (05 Jun 2019 05:22) (53 - 59)  BP: 149/82 (05 Jun 2019 05:22) (137/64 - 149/82)  BP(mean): --  RR: 18 (05 Jun 2019 05:22) (17 - 18)  SpO2: 98% (05 Jun 2019 05:22) (97% - 98%)    PHYSICAL EXAM:    Constitutional: NAD  HEENT: EOMI, throat clear  Neck: No LAD, supple  Respiratory: CTA and P  Cardiovascular: S1 and S2, RRR, no M  Gastrointestinal: BS+, soft, NT/ND, neg HSM,  Extremities: No peripheral edema, neg clubbing, cyanosis  Vascular: 2+ peripheral pulses  Neurological: A/O x 3, no focal deficits  Psychiatric: Normal mood, normal affect  Skin: No rashes      LABS:                        13.1   6.42  )-----------( 235      ( 05 Jun 2019 06:20 )             39.8     06-05    139  |  100  |  14  ----------------------------<  116<H>  4.2   |  25  |  0.58    Ca    9.7      05 Jun 2019 06:20  Phos  3.5     06-04  Mg     1.8     06-05    TPro  6.8  /  Alb  3.8  /  TBili  0.7  /  DBili  x   /  AST  21  /  ALT  17  /  AlkPhos  74  06-04          RADIOLOGY & ADDITIONAL TESTS:
INTERVAL HPI/OVERNIGHT EVENTS:    pain slightly present but improved from yesterday and prior day   no associated n/v    MEDICATIONS  (STANDING):  amLODIPine   Tablet 10 milliGRAM(s) Oral daily  aspirin enteric coated 81 milliGRAM(s) Oral daily  atorvastatin 40 milliGRAM(s) Oral at bedtime  carvedilol 6.25 milliGRAM(s) Oral daily  carvedilol 12.5 milliGRAM(s) Oral daily  citalopram 20 milliGRAM(s) Oral daily  clopidogrel Tablet 75 milliGRAM(s) Oral daily  docusate sodium 100 milliGRAM(s) Oral two times a day  heparin  Injectable 5000 Unit(s) SubCutaneous every 12 hours  hydrochlorothiazide 25 milliGRAM(s) Oral daily  losartan 100 milliGRAM(s) Oral daily  pantoprazole    Tablet 40 milliGRAM(s) Oral two times a day  sodium chloride 0.9% lock flush 3 milliLiter(s) IV Push every 8 hours  sucralfate 1 Gram(s) Oral every 6 hours    MEDICATIONS  (PRN):  aluminum hydroxide/magnesium hydroxide/simethicone Suspension 30 milliLiter(s) Oral every 6 hours PRN Dyspepsia      Allergies    No Known Allergies    Intolerances        Review of Systems:    General:  No wt loss, fevers, chills, night sweats, fatigue   Eyes:  Good vision, no reported pain  ENT:  No sore throat, pain, runny nose, dysphagia  CV:  No pain, palpitations, hypo/hypertension  Resp:  No dyspnea, cough, tachypnea, wheezing  GI:  +pain, No nausea, No vomiting, No diarrhea, No constipation, No weight loss, No fever, No pruritis, No rectal bleeding, No melena, No dysphagia  :  No pain, bleeding, incontinence, nocturia  Muscle:  No pain, weakness  Neuro:  No weakness, tingling, memory problems  Psych:  No fatigue, insomnia, mood problems, depression  Endocrine:  No polyuria, polydypsia, cold/heat intolerance  Heme:  No petechiae, ecchymosis, easy bruisability  Skin:  No rash, tattoos, scars, edema      Vital Signs Last 24 Hrs  T(C): 36.6 (04 Jun 2019 05:37), Max: 36.7 (03 Jun 2019 21:31)  T(F): 97.8 (04 Jun 2019 05:37), Max: 98 (03 Jun 2019 21:31)  HR: 47 (04 Jun 2019 05:37) (47 - 52)  BP: 155/98 (04 Jun 2019 05:37) (133/72 - 155/98)  BP(mean): --  RR: 18 (04 Jun 2019 05:37) (18 - 19)  SpO2: 98% (04 Jun 2019 05:37) (98% - 100%)    PHYSICAL EXAM:    Constitutional: NAD  HEENT: EOMI, throat clear  Neck: No LAD, supple  Respiratory: CTA and P  Cardiovascular: S1 and S2, RRR, no M  Gastrointestinal: BS+, soft, NT/ND, neg HSM,  Extremities: No peripheral edema, neg clubbing, cyanosis  Vascular: 2+ peripheral pulses  Neurological: A/O x 3, no focal deficits  Psychiatric: Normal mood, normal affect  Skin: No rashes      LABS:                        13.3   6.16  )-----------( 229      ( 04 Jun 2019 07:33 )             39.3     06-04    137  |  100  |  14  ----------------------------<  102<H>  4.0   |  25  |  0.53    Ca    9.5      04 Jun 2019 07:33  Phos  3.5     06-04  Mg     1.9     06-04    TPro  6.8  /  Alb  3.8  /  TBili  0.7  /  DBili  x   /  AST  21  /  ALT  17  /  AlkPhos  74  06-04    PT/INR - ( 02 Jun 2019 18:20 )   PT: 11.9 SEC;   INR: 1.07          PTT - ( 02 Jun 2019 18:20 )  PTT:28.7 SEC      RADIOLOGY & ADDITIONAL TESTS:
INTERVAL HPI/OVERNIGHT EVENTS: I feel fine . Seen and examined .  Vital Signs Last 24 Hrs  T(C): 36.6 (04 Jun 2019 12:23), Max: 36.7 (03 Jun 2019 21:31)  T(F): 97.8 (04 Jun 2019 12:23), Max: 98 (03 Jun 2019 21:31)  HR: 53 (04 Jun 2019 12:23) (47 - 53)  BP: 145/77 (04 Jun 2019 12:23) (133/72 - 155/98)  BP(mean): --  RR: 17 (04 Jun 2019 12:23) (17 - 18)  SpO2: 98% (04 Jun 2019 12:23) (98% - 99%)  I&O's Summary    MEDICATIONS  (STANDING):  amLODIPine   Tablet 10 milliGRAM(s) Oral daily  aspirin enteric coated 81 milliGRAM(s) Oral daily  atorvastatin 40 milliGRAM(s) Oral at bedtime  carvedilol 6.25 milliGRAM(s) Oral daily  carvedilol 12.5 milliGRAM(s) Oral daily  citalopram 20 milliGRAM(s) Oral daily  clopidogrel Tablet 75 milliGRAM(s) Oral daily  docusate sodium 100 milliGRAM(s) Oral two times a day  heparin  Injectable 5000 Unit(s) SubCutaneous every 12 hours  hydrochlorothiazide 25 milliGRAM(s) Oral daily  losartan 100 milliGRAM(s) Oral daily  pantoprazole    Tablet 40 milliGRAM(s) Oral two times a day  sodium chloride 0.9% lock flush 3 milliLiter(s) IV Push every 8 hours  sucralfate 1 Gram(s) Oral every 6 hours    MEDICATIONS  (PRN):  aluminum hydroxide/magnesium hydroxide/simethicone Suspension 30 milliLiter(s) Oral every 6 hours PRN Dyspepsia    LABS:                        13.3   6.16  )-----------( 229      ( 04 Jun 2019 07:33 )             39.3     06-04    137  |  100  |  14  ----------------------------<  102<H>  4.0   |  25  |  0.53    Ca    9.5      04 Jun 2019 07:33  Phos  3.5     06-04  Mg     1.9     06-04    TPro  6.8  /  Alb  3.8  /  TBili  0.7  /  DBili  x   /  AST  21  /  ALT  17  /  AlkPhos  74  06-04        CAPILLARY BLOOD GLUCOSE              REVIEW OF SYSTEMS:  CONSTITUTIONAL: No fever, weight loss, or fatigue  EYES: No eye pain, visual disturbances, or discharge  ENMT:  No difficulty hearing, tinnitus, vertigo; No sinus or throat pain  NECK: No pain or stiffness  RESPIRATORY: No cough, wheezing, chills or hemoptysis; No shortness of breath  CARDIOVASCULAR: No chest pain, palpitations, dizziness, or leg swelling  GASTROINTESTINAL: No abdominal or epigastric pain. No nausea, vomiting, or hematemesis; No diarrhea or constipation. No melena or hematochezia.  GENITOURINARY: No dysuria, frequency, hematuria, or incontinence  NEUROLOGICAL: No headaches, memory loss, loss of strength, numbness, or tremors      Consultant(s) Notes Reviewed:  [x ] YES  [ ] NO    PHYSICAL EXAM:  GENERAL: NAD, well-groomed, well-developed,not in any distress ,  HEAD:  Atraumatic, Normocephalic  EYES: EOMI, PERRLA, conjunctiva and sclera clear  ENMT: No tonsillar erythema, exudates, or enlargement; Moist mucous membranes, Good dentition, No lesions  NECK: Supple, No JVD, Normal thyroid  NERVOUS SYSTEM:  Alert & Oriented X3, No focal deficit   CHEST/LUNG: Good air entry bilateral with no  rales, rhonchi, wheezing, or rubs  HEART: Regular rate and rhythm; No murmurs, rubs, or gallops  ABDOMEN: Soft, Nontender, Nondistended; Bowel sounds present  EXTREMITIES:  2+ Peripheral Pulses, No clubbing, cyanosis, or edema  SKIN: No rashes or lesions    Care Discussed with Consultants/Other Providers [ x] YES  [ ] NO
Patient denies chest pain or shortness of breath.   Review of systems otherwise (-)  	  MEDICATIONS:  MEDICATIONS  (STANDING):  amLODIPine   Tablet 10 milliGRAM(s) Oral daily  aspirin enteric coated 81 milliGRAM(s) Oral daily  atorvastatin 40 milliGRAM(s) Oral at bedtime  carvedilol 6.25 milliGRAM(s) Oral daily  carvedilol 12.5 milliGRAM(s) Oral daily  citalopram 20 milliGRAM(s) Oral daily  clopidogrel Tablet 75 milliGRAM(s) Oral daily  famotidine    Tablet 20 milliGRAM(s) Oral two times a day  heparin  Injectable 5000 Unit(s) SubCutaneous every 12 hours  hydrochlorothiazide 25 milliGRAM(s) Oral daily  losartan 100 milliGRAM(s) Oral daily  sodium chloride 0.9% lock flush 3 milliLiter(s) IV Push every 8 hours      LABS:	 	    CARDIAC MARKERS:                                13.3   7.21  )-----------( 231      ( 03 Jun 2019 05:05 )             39.9     Hemoglobin: 13.3 g/dL (06-03 @ 05:05)  Hemoglobin: 12.9 g/dL (06-02 @ 18:20)      06-03    137  |  101  |  15  ----------------------------<  78  4.2   |  21<L>  |  0.46<L>    Ca    9.2      03 Jun 2019 05:05  Mg     2.1     06-03    TPro  7.1  /  Alb  4.1  /  TBili  0.3  /  DBili  x   /  AST  22  /  ALT  21  /  AlkPhos  77  06-02    Creatinine Trend: 0.46<--, 0.69<--    COAGS:   PT/INR - ( 02 Jun 2019 18:20 )   PT: 11.9 SEC;   INR: 1.07          PTT - ( 02 Jun 2019 18:20 )  PTT:28.7 SEC    proBNP: Serum Pro-Brain Natriuretic Peptide: 244.9 pg/mL (06-02 @ 18:20)    Lipid Profile:   HgA1c:   TSH: Thyroid Stimulating Hormone, Serum: 1.62 uIU/mL (06-03 @ 05:05)    PHYSICAL EXAM:  T(C): 36.6 (06-03-19 @ 10:31), Max: 37 (06-02-19 @ 17:28)  HR: 48 (06-03-19 @ 10:31) (48 - 61)  BP: 141/59 (06-03-19 @ 10:31) (132/52 - 163/63)  RR: 19 (06-03-19 @ 10:31) (6 - 19)  SpO2: 100% (06-03-19 @ 10:31) (97% - 100%)  Wt(kg): --  I&O's Summary    Height (cm): 170.18 (06-02 @ 23:16)  Weight (kg): 79.4 (06-02 @ 23:16)  BMI (kg/m2): 27.4 (06-02 @ 23:16)  BSA (m2): 1.91 (06-02 @ 23:16)    Gen: Appears well in NAD  HEENT:  (-)icterus (-)pallor  CV: N S1 S2 1/6 MARISABEL (+)2 Pulses B/l  Resp:  Clear to ausculatation B/L, normal effort  GI: (+) BS Soft, NT, ND  Lymph:  (-)Edema, (-)obvious lymphadenopathy  Skin: Warm to touch, Normal turgor  Psych: Appropriate mood and affect      TELEMETRY: 	  SB 53     DATA:     < from: Cardiac Cath Lab - Adult (01.23.19 @ 15:41) >  VENTRICLES: EF calculated by contrast ventriculography was 60 %.  CORONARY VESSELS: The coronary circulation is right dominant.  LM:   --  LM: Normal.  LAD:   --  Proximal LAD: There was a diffuse 30 % stenosis.  --  Mid LAD: Angiography showed minor luminal irregularities with no flow  limiting lesions.  --  Distal LAD: Angiography showed minor luminal irregularities with no  flow limiting lesions.  --  D1: Angiography showed minor luminal irregularities with no flow  limiting lesions.  CX:   --  Circumflex: Angiography showed minor luminal irregularities with  no flow limiting lesions.  --  Proximal circumflex: There was no significant restenosis in proximal Cx  stent.  --  OM1: Angiography showed minor luminal irregularities with no flow  limiting lesions.  --  OM2: Angiography showed minor luminal irregularities with no flow  limiting lesions.  RCA:   --  Proximal RCA: Angiography showed minor luminal irregularities  with no flow limiting lesions. There was no significant restenosis in  proximal RCA stent.  --  Mid RCA: There was a [id number] % stenosis at the site of a prior  stent.  --  Distal RCA: Angiography showed mild atherosclerosis with no flow  limiting lesions.  --  RPDA: Angiography showed mild atherosclerosis with no flow limiting  lesions.  --  RPLS: Angiography showed minor luminal irregularities with no flow  limiting lesions.  COMPLICATIONS: There were no complications.  DIAGNOSTIC RECOMMENDATIONS: Coronary angiogram demonstrates patent stents  and mild nonobstructive CAD. Medical management is recommended.  Prepared and signed by  Payam Urbano M.D.  Signed 01/23/2019 18:59:32    < end of copied text >    < from: Cardiac Cath Lab - Adult (07.24.18 @ 13:29) >  VENTRICLES: EF estimated was 60 %.  CORONARY VESSELS: The coronary circulation is right dominant.  LM:   --  LM: Normal.  LAD:   --  Proximal LAD: There was a diffuse 20 % stenosis.  --  Mid LAD: Angiography showed mild atherosclerosis with no flow limiting  lesions. --  Distal LAD: Angiography showed mild atherosclerosis with no flow  limiting lesions. --  D1: Angiography showed minor luminal irregularities with  no flow limiting lesions.  CX:   --  Proximal circumflex: There was a discrete 90 % stenosis. The lesion  was calcified.  --  Mid circumflex: There was a discrete 30 % stenosis.  --  Distal circumflex: Angiography showed minor luminal irregularities with no  flow limiting lesions. --  OM1: Angiography showed minor luminal irregularities  with no flow limiting lesions. --  OM2: Angiography showed minor luminal  irregularities with no flow limiting lesions.  RCA:   --  Proximal RCA: Angiography showed mild atherosclerosis with no flow  limiting lesions. Mild in-stent restenosis in the proximal RCA stent.  --  Mid RCA: There was a discrete 40 % stenosis at the site of a prior stent.  --  Distal RCA: Angiography showed mild atherosclerosis with noflow limiting  lesions. --  RPDA: Angiography showed mild atherosclerosis with no flow  limiting lesions. --  RPLS: Angiography showed minor luminal irregularities  with no flow limiting lesions.  COMPLICATIONS: There were no complications. No complications occurred during  the cath lab visit.  DIAGNOSTIC RECOMMENDATIONS: Coronary angiogram demonstrates a severe stenosis  in the proximal circumflex that is the culprit of the patient's symptoms. Will  perform PCI to the proximal circumflex.  INTERVENTIONAL RECOMMENDATIONS: S/p successful TG to the proximal circumflex.  Post stent IVUS imaging demonstrated good stent apposition. The patient should  continue with dual antiplatelet therapy for at least 12 months.  Prepared and signed by  Payam Urbano M.D.  Signed 07/25/2018 14:01:57    < end of copied text >    < from: Transthoracic Echocardiogram (07.25.18 @ 09:45) >  CONCLUSIONS:  1. Mitral annular calcification, otherwise normal mitral  valve. Minimal mitral regurgitation.  2. Mildly dilated left atrium.  LA volume index = 36 cc/m2.  3. Normal left ventricular internal dimensions and wall  thicknesses.  4.Endocardium not well visualized; grossly normal left  ventricular systolic function.  5. Normal right ventricular size and function.  6. Estimated right ventricular systolic pressure equals 37  mm Hg, assuming right atrial pressure equals 10 mm Hg,  consistent with borderline pulmonary hypertension.  ------------------------------------------------------------------------  Confirmed on  7/25/2018 - 12:31:57 by Nils Terry M.D.    < end of copied text >    ASSESSMENT/PLAN:     67yo F with  h/o HTN, CAD w/ multiple stents, current smoker, GERD, HLD, presents with Chest pain. Cardiology consults for chest pain, r/o ACS.     -- no chest pain or sob on assessment  -- CAD s/p multiple TG July 2018  -- Previous caths and echo as noted above  -- Recent cardiac cath January 2019 with patent stents  -- Plan for NST tomorrow  -- Repeat echo pending  -- Further cardiac work up pending above                                mer
SUBJECTIVE: No SOB or CP      MEDICATIONS  (STANDING):  amLODIPine   Tablet 10 milliGRAM(s) Oral daily  aspirin enteric coated 81 milliGRAM(s) Oral daily  atorvastatin 40 milliGRAM(s) Oral at bedtime  buDESOnide 160 MICROgram(s)/formoterol 4.5 MICROgram(s) Inhaler 2 Puff(s) Inhalation two times a day  carvedilol 6.25 milliGRAM(s) Oral daily  carvedilol 12.5 milliGRAM(s) Oral daily  citalopram 20 milliGRAM(s) Oral daily  clopidogrel Tablet 75 milliGRAM(s) Oral daily  docusate sodium 100 milliGRAM(s) Oral two times a day  heparin  Injectable 5000 Unit(s) SubCutaneous every 12 hours  hydrochlorothiazide 25 milliGRAM(s) Oral daily  losartan 100 milliGRAM(s) Oral daily  pantoprazole    Tablet 40 milliGRAM(s) Oral two times a day  sodium chloride 0.9% lock flush 3 milliLiter(s) IV Push every 8 hours    MEDICATIONS  (PRN):  aluminum hydroxide/magnesium hydroxide/simethicone Suspension 30 milliLiter(s) Oral every 6 hours PRN Dyspepsia      LABS:                            13.1   6.42  )-----------( 235      ( 05 Jun 2019 06:20 )             39.8     139  |  100  |  14  ----------------------------<  116<H>  4.2   |  25  |  0.58    Ca    9.7      05 Jun 2019 06:20  Phos  3.5     06-04  Mg     1.8     06-05    TPro  6.8  /  Alb  3.8  /  TBili  0.7  /  DBili  x   /  AST  21  /  ALT  17  /  AlkPhos  74  06-04    Creatinine Trend: 0.58<--, 0.53<--, 0.46<--, 0.69<--      PHYSICAL EXAM  Vital Signs Last 24 Hrs  T(C): 36.7 (05 Jun 2019 13:14), Max: 36.7 (05 Jun 2019 13:14)  T(F): 98 (05 Jun 2019 13:14), Max: 98 (05 Jun 2019 13:14)  HR: 51 (05 Jun 2019 13:14) (51 - 59)  BP: 163/83 (05 Jun 2019 13:14) (137/64 - 163/83)  RR: 17 (05 Jun 2019 13:14) (17 - 18)  SpO2: 100% (05 Jun 2019 13:14) (97% - 100%)    Cardiovascular:  S1S2 RRR, No JVD  Respiratory: Lungs clear to auscultation, normal effort  Gastrointestinal: Abdomen soft, ND, NT, +BS  Skin: Warm, dry, intact. No rash.  Musculoskeletal: Normal ROM, normal strength  Ext: No C/C/E B/L LE    DIAGNOSTIC DATA  TELEMETRY: SB/SR    < from: Transthoracic Echocardiogram (06.03.19 @ 15:45) >  CONCLUSIONS:  1. Mitral annular calcification, otherwise normal mitral  valve. Mild mitral regurgitation.  2. Moderately dilated left atrium.  LA volume index = 47  cc/m2.  3. Normal left ventricular internal dimensions and wall  thicknesses.  4. Normal left ventricular systolic function. No segmental  wall motion abnormalities.  5. Normal right ventricular size and function.  ------------------------------------------------------------------------  Confirmed on  6/3/2019 - 17:09:58 by Nils Terry M.D.    < end of copied text >    < from: Cardiac Cath Lab - Adult (01.23.19 @ 15:41) >  VENTRICLES: EF calculated by contrast ventriculography was 60 %.  CORONARY VESSELS: The coronary circulation is right dominant.  LM:   --  LM: Normal.  LAD:   --  Proximal LAD: There was a diffuse 30 % stenosis.  --  Mid LAD: Angiography showed minor luminal irregularities with no flow  limiting lesions.  --  Distal LAD: Angiography showed minor luminal irregularities with no  flow limiting lesions.  --  D1: Angiography showed minor luminal irregularities with no flow  limiting lesions.  CX:   --  Circumflex: Angiography showed minor luminal irregularities with  no flow limiting lesions.  --  Proximal circumflex: There was no significant restenosis in proximal Cx  stent.  --  OM1: Angiography showed minor luminal irregularities with no flow  limiting lesions.  --  OM2: Angiography showed minor luminal irregularities with no flow  limiting lesions.  RCA:   --  Proximal RCA: Angiography showed minor luminal irregularities  with no flow limiting lesions. There was no significant restenosis in  proximal RCA stent.  --  Mid RCA: There was a [id number] % stenosis at the site of a prior  stent.  --  Distal RCA: Angiography showed mild atherosclerosis with no flow  limiting lesions.  --  RPDA: Angiography showed mild atherosclerosis with no flow limiting  lesions.  --  RPLS: Angiography showed minor luminal irregularities with no flow  limiting lesions.  COMPLICATIONS: There were no complications.  DIAGNOSTIC RECOMMENDATIONS: Coronary angiogram demonstrates patent stents  and mild nonobstructive CAD. Medical management is recommended.  Prepared and signed by  Payam Urbano M.D.  Signed 01/23/2019 18:59:32    < end of copied text >        < from: Cardiac Cath Lab - Adult (07.24.18 @ 13:29) >  VENTRICLES: EF estimated was 60 %.    DIAGNOSTIC RECOMMENDATIONS: Coronary angiogram demonstrates a severe stenosis  in the proximal circumflex that is the culprit of the patient's symptoms. Will  perform PCI to the proximal circumflex.  INTERVENTIONAL RECOMMENDATIONS: S/p successful TG to the proximal circumflex.  Post stent IVUS imaging demonstrated good stent apposition. The patient should  continue with dual antiplatelet therapy for at least 12 months.  Prepared and signed by  Payam Urbano M.D.  Signed 07/25/2018 14:01:57    < end of copied text >      ASSESSMENT AND PLAN:      68y Female with history of HTN, CAD hx remote RCA stent,  s/p LCx stent in July of 2018, tobacco abuse, HLD, GERD, last C 1/2019 with patent stents, admitted with chest pain    --ACS ruled out  --TTE as above with preserved LV function  --ABnormal NST s/p C with non obs CAD 6/4/19  --CTPA with no PE  --Appreciate Pulm eval  --DC home with OP F/U with [facility] 6/10 at 1145 as scheduled
SUBJECTIVE: No SOB, reports mild chest pain this AM    MEDICATIONS  (STANDING):  amLODIPine   Tablet 10 milliGRAM(s) Oral daily  aspirin enteric coated 81 milliGRAM(s) Oral daily  atorvastatin 40 milliGRAM(s) Oral at bedtime  carvedilol 6.25 milliGRAM(s) Oral daily  carvedilol 12.5 milliGRAM(s) Oral daily  citalopram 20 milliGRAM(s) Oral daily  clopidogrel Tablet 75 milliGRAM(s) Oral daily  docusate sodium 100 milliGRAM(s) Oral two times a day  heparin  Injectable 5000 Unit(s) SubCutaneous every 12 hours  hydrochlorothiazide 25 milliGRAM(s) Oral daily  losartan 100 milliGRAM(s) Oral daily  pantoprazole    Tablet 40 milliGRAM(s) Oral two times a day  sodium chloride 0.9% lock flush 3 milliLiter(s) IV Push every 8 hours  sucralfate 1 Gram(s) Oral every 6 hours    MEDICATIONS  (PRN):  aluminum hydroxide/magnesium hydroxide/simethicone Suspension 30 milliLiter(s) Oral every 6 hours PRN Dyspepsia      LABS:                     13.3   6.16  )-----------( 229      ( 04 Jun 2019 07:33 )             39.3     137  |  100  |  14  ----------------------------<  102<H>  4.0   |  25  |  0.53    Ca    9.5      04 Jun 2019 07:33  Phos  3.5     06-04  Mg     1.9     06-04    TPro  6.8  /  Alb  3.8  /  TBili  0.7  /  DBili  x   /  AST  21  /  ALT  17  /  AlkPhos  74  06-04    Serum Pro-Brain Natriuretic Peptide: 244.9 pg/mL (06-02 @ 18:20)    PHYSICAL EXAM:  Vital Signs Last 24 Hrs  T(C): 36.6 (04 Jun 2019 05:37), Max: 36.7 (03 Jun 2019 21:31)  T(F): 97.8 (04 Jun 2019 05:37), Max: 98 (03 Jun 2019 21:31)  HR: 47 (04 Jun 2019 05:37) (47 - 52)  BP: 155/98 (04 Jun 2019 05:37) (133/72 - 155/98)  RR: 18 (04 Jun 2019 05:37) (18 - 18)  SpO2: 98% (04 Jun 2019 05:37) (98% - 100%)    Cardiovascular:  S1S2 RRR, No JVD  Respiratory: Lungs clear to auscultation, normal effort  Gastrointestinal: Abdomen soft, ND, NT, +BS  Skin: Warm, dry, intact. No rash.  Musculoskeletal: Normal ROM, normal strength  Ext: No C/C/E B/L LE    DIAGNOSTIC DATA  TELEMETRY: SB/SR    < from: Transthoracic Echocardiogram (06.03.19 @ 15:45) >  CONCLUSIONS:  1. Mitral annular calcification, otherwise normal mitral  valve. Mild mitral regurgitation.  2. Moderately dilated left atrium.  LA volume index = 47  cc/m2.  3. Normal left ventricular internal dimensions and wall  thicknesses.  4. Normal left ventricular systolic function. No segmental  wall motion abnormalities.  5. Normal right ventricular size and function.  ------------------------------------------------------------------------  Confirmed on  6/3/2019 - 17:09:58 by Nils Terry M.D.    < end of copied text >    < from: Cardiac Cath Lab - Adult (01.23.19 @ 15:41) >  VENTRICLES: EF calculated by contrast ventriculography was 60 %.  CORONARY VESSELS: The coronary circulation is right dominant.  LM:   --  LM: Normal.  LAD:   --  Proximal LAD: There was a diffuse 30 % stenosis.  --  Mid LAD: Angiography showed minor luminal irregularities with no flow  limiting lesions.  --  Distal LAD: Angiography showed minor luminal irregularities with no  flow limiting lesions.  --  D1: Angiography showed minor luminal irregularities with no flow  limiting lesions.  CX:   --  Circumflex: Angiography showed minor luminal irregularities with  no flow limiting lesions.  --  Proximal circumflex: There was no significant restenosis in proximal Cx  stent.  --  OM1: Angiography showed minor luminal irregularities with no flow  limiting lesions.  --  OM2: Angiography showed minor luminal irregularities with no flow  limiting lesions.  RCA:   --  Proximal RCA: Angiography showed minor luminal irregularities  with no flow limiting lesions. There was no significant restenosis in  proximal RCA stent.  --  Mid RCA: There was a [id number] % stenosis at the site of a prior  stent.  --  Distal RCA: Angiography showed mild atherosclerosis with no flow  limiting lesions.  --  RPDA: Angiography showed mild atherosclerosis with no flow limiting  lesions.  --  RPLS: Angiography showed minor luminal irregularities with no flow  limiting lesions.  COMPLICATIONS: There were no complications.  DIAGNOSTIC RECOMMENDATIONS: Coronary angiogram demonstrates patent stents  and mild nonobstructive CAD. Medical management is recommended.  Prepared and signed by  Payam Urbano M.D.  Signed 01/23/2019 18:59:32    < end of copied text >        < from: Cardiac Cath Lab - Adult (07.24.18 @ 13:29) >  VENTRICLES: EF estimated was 60 %.    DIAGNOSTIC RECOMMENDATIONS: Coronary angiogram demonstrates a severe stenosis  in the proximal circumflex that is the culprit of the patient's symptoms. Will  perform PCI to the proximal circumflex.  INTERVENTIONAL RECOMMENDATIONS: S/p successful TG to the proximal circumflex.  Post stent IVUS imaging demonstrated good stent apposition. The patient should  continue with dual antiplatelet therapy for at least 12 months.  Prepared and signed by  Payam Urbano M.D.  Signed 07/25/2018 14:01:57    < end of copied text >      ASSESSMENT AND PLAN:      68y Female with history of HTN, CAD hx remote RCA stent,  s/p LCx stent in July of 2018, tobacco abuse, HLD, GERD, last [facility] 1/2019 with patent stents, admitted with chest pain    --ACS ruled out  --TTE as above with preserved LV function  --F/u NST report  --GI plan pending NST results
INTERVAL HPI/OVERNIGHT EVENTS: My right knee is hurting.   Vital Signs Last 24 Hrs  T(C): 36.7 (05 Jun 2019 13:14), Max: 36.7 (05 Jun 2019 13:14)  T(F): 98 (05 Jun 2019 13:14), Max: 98 (05 Jun 2019 13:14)  HR: 51 (05 Jun 2019 13:14) (51 - 59)  BP: 163/83 (05 Jun 2019 13:14) (137/64 - 163/83)  BP(mean): --  RR: 17 (05 Jun 2019 13:14) (17 - 18)  SpO2: 100% (05 Jun 2019 13:14) (97% - 100%)  I&O's Summary    MEDICATIONS  (STANDING):  amLODIPine   Tablet 10 milliGRAM(s) Oral daily  aspirin enteric coated 81 milliGRAM(s) Oral daily  atorvastatin 40 milliGRAM(s) Oral at bedtime  buDESOnide 160 MICROgram(s)/formoterol 4.5 MICROgram(s) Inhaler 2 Puff(s) Inhalation two times a day  carvedilol 6.25 milliGRAM(s) Oral daily  carvedilol 12.5 milliGRAM(s) Oral daily  citalopram 20 milliGRAM(s) Oral daily  clopidogrel Tablet 75 milliGRAM(s) Oral daily  docusate sodium 100 milliGRAM(s) Oral two times a day  heparin  Injectable 5000 Unit(s) SubCutaneous every 12 hours  hydrochlorothiazide 25 milliGRAM(s) Oral daily  losartan 100 milliGRAM(s) Oral daily  pantoprazole    Tablet 40 milliGRAM(s) Oral two times a day  sodium chloride 0.9% lock flush 3 milliLiter(s) IV Push every 8 hours    MEDICATIONS  (PRN):  aluminum hydroxide/magnesium hydroxide/simethicone Suspension 30 milliLiter(s) Oral every 6 hours PRN Dyspepsia    LABS:                        13.1   6.42  )-----------( 235      ( 05 Jun 2019 06:20 )             39.8     06-05    139  |  100  |  14  ----------------------------<  116<H>  4.2   |  25  |  0.58    Ca    9.7      05 Jun 2019 06:20  Phos  3.5     06-04  Mg     1.8     06-05    TPro  6.8  /  Alb  3.8  /  TBili  0.7  /  DBili  x   /  AST  21  /  ALT  17  /  AlkPhos  74  06-04        CAPILLARY BLOOD GLUCOSE              REVIEW OF SYSTEMS:  CONSTITUTIONAL: No fever, weight loss, or fatigue  EYES: No eye pain, visual disturbances, or discharge  ENMT:  No difficulty hearing, tinnitus, vertigo; No sinus or throat pain  NECK: No pain or stiffness  RESPIRATORY: No cough, wheezing, chills or hemoptysis; No shortness of breath  CARDIOVASCULAR: No chest pain, palpitations, dizziness, or leg swelling  GASTROINTESTINAL: No abdominal or epigastric pain. No nausea, vomiting, or hematemesis; No diarrhea or constipation. No melena or hematochezia.  GENITOURINARY: No dysuria, frequency, hematuria, or incontinence  NEUROLOGICAL: No headaches, memory loss, loss of strength, numbness, or tremors    Consultant(s) Notes Reviewed:  [x ] YES  [ ] NO    PHYSICAL EXAM:  GENERAL: NAD, well-groomed, well-developed,not in any distress ,  HEAD:  Atraumatic, Normocephalic  EYES: EOMI, PERRLA, conjunctiva and sclera clear  ENMT: No tonsillar erythema, exudates, or enlargement; Moist mucous membranes, Good dentition, No lesions  NECK: Supple, No JVD, Normal thyroid  NERVOUS SYSTEM:  Alert & Oriented X3, No focal deficit   CHEST/LUNG: Good air entry bilateral with no  rales, rhonchi, wheezing, or rubs  HEART: Regular rate and rhythm; No murmurs, rubs, or gallops  ABDOMEN: Soft, Nontender, Nondistended; Bowel sounds present  EXTREMITIES:  2+ Peripheral Pulses, No clubbing, cyanosis, or edema  tenderness medial side of knee . ROM normal.     Care Discussed with Consultants/Other Providers [ x] YES  [ ] NO

## 2019-06-05 NOTE — CONSULT NOTE ADULT - SUBJECTIVE AND OBJECTIVE BOX
Patient is a 68y old  Female who presents with a chief complaint of Chest pain       HPI:  67yo F with  h/o HTN, CAD w/ multiple stents, current smoker, GERD, HLD, presents with Chest pain. Pt states  Initially had episode of cp yesterday that resolved, but woke up this morning with midsternal/ epigastric chest pain, described as squeezing, 8/10, radiates to the back and upwards, + nausea, + SOB, + diaphoresis, no vomiting, has been constant, not worse with exertion. + chronic cough, no fever, worse than prior Chest pain, Pt took 1 asa and her usual dose of Plavix prior to coming to ed. Pain was relieved with Maalox and Nitro given in ED. Currently pt appears comfortable NAD, CP free.       PAST MEDICAL & SURGICAL HISTORY:  Macular hole: s/p vitrectomy  Other chronic osteomyelitis of foot: right  Anxiety  Depression  Hyperlipidemia  Hypertension  Bradycardia  Smoker: Current smoker 40 pack years  GERD (gastroesophageal reflux disease)  Reflux gastritis  CAD (coronary artery disease): 6 stents, most recent 18  S/P foot surgery, right: Bunionectomy and hammer toe with multiple revisions  Carpal tunnel syndrome, bilateral: b/l wrist surgery for carpal tunnel syndrome  Stented coronary artery: x 6 most recent 18   delivery delivered: x2      Social History:    FAMILY HISTORY:  Family history of prostate cancer (Sibling): brother  Family history of Alzheimer's disease: Father  Family history of heart disease (Sibling): Twin sister has cardiac stents  Brother has cardiac stents  Family history of breast cancer (Grandparent): Maternal Grandmother  Family history of myocardial infarction (Grandparent): Paternal Grandmother diet in her 60&#x27;s of MI      Allergies    No Known Allergies    Intolerances        REVIEW OF SYSTEMS:    CONSTITUTIONAL: No fever, weight loss, or fatigue  EYES: No eye pain, visual disturbances, or discharge  RESPIRATORY: No cough, wheezing, chills or hemoptysis; No shortness of breath  CARDIOVASCULAR:  chest pain, palpitations,but no  dizziness, or leg swelling  GASTROINTESTINAL: No abdominal or epigastric pain.   Had nausea, but no vomiting, or hematemesis; No diarrhea or constipation. No melena or hematochezia.  GENITOURINARY: No dysuria, frequency, hematuria, or incontinence  NEUROLOGICAL: No headaches, memory loss, loss of strength, numbness, or tremors  SKIN: No itching, burning, rashes, or lesions   ENDOCRINE: No heat or cold intolerance; No hair loss  MUSCULOSKELETAL: No joint pain or swelling; No muscle, back, or extremity pain  PSYCHIATRIC: No depression, anxiety, mood swings, or difficulty sleeping      MEDICATIONS  (STANDING):  amLODIPine   Tablet 10 milliGRAM(s) Oral daily  aspirin enteric coated 81 milliGRAM(s) Oral daily  atorvastatin 40 milliGRAM(s) Oral at bedtime  carvedilol 6.25 milliGRAM(s) Oral daily  carvedilol 12.5 milliGRAM(s) Oral daily  citalopram 20 milliGRAM(s) Oral daily  clopidogrel Tablet 75 milliGRAM(s) Oral daily  docusate sodium 100 milliGRAM(s) Oral two times a day  heparin  Injectable 5000 Unit(s) SubCutaneous every 12 hours  hydrochlorothiazide 25 milliGRAM(s) Oral daily  losartan 100 milliGRAM(s) Oral daily  pantoprazole    Tablet 40 milliGRAM(s) Oral two times a day  sodium chloride 0.9% lock flush 3 milliLiter(s) IV Push every 8 hours  sucralfate 1 Gram(s) Oral every 6 hours    MEDICATIONS  (PRN):  aluminum hydroxide/magnesium hydroxide/simethicone Suspension 30 milliLiter(s) Oral every 6 hours PRN Dyspepsia      Vital Signs Last 24 Hrs  T(C): 36.6 (2019 10:31), Max: 37 (2019 17:28)  T(F): 97.9 (2019 10:31), Max: 98.6 (2019 17:28)  HR: 48 (2019 10:31) (48 - 61)  BP: 141/59 (2019 10:31) (132/52 - 163/63)  BP(mean): --  RR: 19 (2019 10:31) (6 - 19)  SpO2: 100% (2019 10:31) (97% - 100%)    PHYSICAL EXAM:    GENERAL: NAD, well-groomed, well-developed  HEAD:  Atraumatic, Normocephalic  EYES: EOMI, PERRLA, conjunctiva and sclera clear  ENMT: No tonsillar erythema, exudates, or enlargement; Moist mucous membranes, Good dentition, No lesions  NECK: Supple, No JVD, Normal thyroid  NERVOUS SYSTEM:  Alert & Oriented X3, No focal sign   CHEST/LUNG: Clear to percussion bilaterally; No rales, rhonchi, wheezing, or rubs  HEART: Regular rate and rhythm; No murmurs, rubs, or gallops  ABDOMEN: Soft, Nontender, Nondistended; Bowel sounds present  EXTREMITIES:  2+ Peripheral Pulses, No clubbing, cyanosis, or edema    LABS:                        13.3   7.21  )-----------( 231      ( 2019 05:05 )             39.9     06-03    137  |  101  |  15  ----------------------------<  78  4.2   |  21<L>  |  0.46<L>    Ca    9.2      2019 05:05  Mg     2.1     06-03    TPro  7.1  /  Alb  4.1  /  TBili  0.3  /  DBili  x   /  AST  22  /  ALT  21  /  AlkPhos  77  06-02    PT/INR - ( 2019 18:20 )   PT: 11.9 SEC;   INR: 1.07          PTT - ( 2019 18:20 )  PTT:28.7 SEC        RADIOLOGY & ADDITIONAL STUDIES:
Patient is a 68y old  Female who presents with a chief complaint of Chest pain (2019 12:07)      HPI:  This is a 67yo F with  h/o HTN, CAD w/ multiple stents, current smoker, GERD, HLD, presents with Chest pain. Pt states  Initially had episode of cp yesterday that resolved, but woke up this morning with midsternal/ epigastric chest pain, described as squeezing, 8/10, radiates to the back and upwards, + nausea, + SOB, + diaphoresis, no vomiting, has been constant, not worse with exertion. + chronic cough, no fever, worse than prior Chest pain, Pt took 1 asa and her usual dose of Plavix prior to coming to ed. Pain was relieved with Maalox and Nitro given in ED. Currently pt appears comfortable NAD, CP free. (2019 23:16)    she says she smokes actively and came in to hospital with chest pain: She was told by her PMD that she may have slight touch of copd. She is not using any inhalers nd at times she coughs in jameel morning: She wheezes at times too      ?FOLLOWING PRESENT  [x ] Hx of PE/DVT, [x ] Hx COPD, [ x] Hx of Asthma, [y ] Hx of Hospitalization, [ x]  Hx of BiPAP/CPAP use, [ x] Hx of MATILDE    Allergies    No Known Allergies    Intolerances        PAST MEDICAL & SURGICAL HISTORY:  Macular hole: s/p vitrectomy  Other chronic osteomyelitis of foot: right  Anxiety  Depression  Hyperlipidemia  Hypertension  Bradycardia  Smoker: Current smoker 40 pack years  GERD (gastroesophageal reflux disease)  Reflux gastritis  CAD (coronary artery disease): 6 stents, most recent 18  S/P foot surgery, right: Bunionectomy and hammer toe with multiple revisions  Carpal tunnel syndrome, bilateral: b/l wrist surgery for carpal tunnel syndrome  Stented coronary artery: x 6 most recent 18   delivery delivered: x2      FAMILY HISTORY:  Family history of prostate cancer (Sibling): brother  Family history of Alzheimer's disease: Father  Family history of heart disease (Sibling): Twin sister has cardiac stents  Brother has cardiac stents  Family history of breast cancer (Grandparent): Maternal Grandmother  Family history of myocardial infarction (Grandparent): Paternal Grandmother diet in her 60&#x27;s of MI      Social History: [400 pk years: active smoking  ] TOBACCO                  [  x] ETOH                                 [ x ] IVDA/DRUGS    REVIEW OF SYSTEMS      General:	x    Skin/Breast:x  	  Ophthalmologic:x  	  ENMT:	x    Respiratory and Thorax: sob, JAVED   	  Cardiovascular:	x    Gastrointestinal:	x    Genitourinary:	x    Musculoskeletal:	x    Neurological:	x    Psychiatric:x	    Hematology/Lymphatics:	x    Endocrine:	x    Allergic/Immunologic:	x    MEDICATIONS  (STANDING):  amLODIPine   Tablet 10 milliGRAM(s) Oral daily  aspirin enteric coated 81 milliGRAM(s) Oral daily  atorvastatin 40 milliGRAM(s) Oral at bedtime  carvedilol 6.25 milliGRAM(s) Oral daily  carvedilol 12.5 milliGRAM(s) Oral daily  citalopram 20 milliGRAM(s) Oral daily  clopidogrel Tablet 75 milliGRAM(s) Oral daily  docusate sodium 100 milliGRAM(s) Oral two times a day  heparin  Injectable 5000 Unit(s) SubCutaneous every 12 hours  hydrochlorothiazide 25 milliGRAM(s) Oral daily  losartan 100 milliGRAM(s) Oral daily  pantoprazole    Tablet 40 milliGRAM(s) Oral two times a day  sodium chloride 0.9% lock flush 3 milliLiter(s) IV Push every 8 hours    MEDICATIONS  (PRN):  aluminum hydroxide/magnesium hydroxide/simethicone Suspension 30 milliLiter(s) Oral every 6 hours PRN Dyspepsia       Vital Signs Last 24 Hrs  T(C): 36.1 (2019 05:22), Max: 36.6 (2019 21:27)  T(F): 97 (2019 05:22), Max: 97.9 (2019 21:27)  HR: 59 (2019 05:22) (58 - 59)  BP: 149/82 (2019 05:22) (137/64 - 149/82)  BP(mean): --  RR: 18 (2019 05:22) (18 - 18)  SpO2: 98% (2019 05:22) (97% - 98%)        I&O's Summary      Physical Exam:   GENERAL: NAD, well-groomed, well-developed  HEENT: FATIMAH/   Atraumatic, Normocephalic  ENMT: No tonsillar erythema, exudates, or enlargement; Moist mucous membranes, Good dentition, No lesions  NECK: Supple, No JVD, Normal thyroid  CHEST/LUNG: Clear to auscultation bilaterally; No rales, rhonchi, wheezing, or rubs  CVS: Regular rate and rhythm; No murmurs, rubs, or gallops  GI: : Soft, Nontender, Nondistended; Bowel sounds present  NERVOUS SYSTEM:  Alert & Oriented X3  EXTREMITIES:  2+ Peripheral Pulses, No clubbing, cyanosis, or edema  LYMPH: No lymphadenopathy noted  SKIN: No rashes or lesions  ENDOCRINOLOGY: No Thyromegaly  PSYCH: Appropriate    Labs:                              13.1   6.42  )-----------( 235      ( 2019 06:20 )             39.8                         13.3   6.16  )-----------( 229      ( 2019 07:33 )             39.3                         13.3   7.21  )-----------( 231      ( 2019 05:05 )             39.9                         12.9   8.43  )-----------( 252      ( 2019 18:20 )             38.9     06-05    139  |  100  |  14  ----------------------------<  116<H>  4.2   |  25  |  0.58  06-04    137  |  100  |  14  ----------------------------<  102<H>  4.0   |  25  |  0.53  06-03    137  |  101  |  15  ----------------------------<  78  4.2   |  21<L>  |  0.46<L>  06-02    136  |  100  |  17  ----------------------------<  91  3.8   |  23  |  0.69    Ca    9.7      2019 06:20  Ca    9.5      2019 07:33  Phos  3.5     06-04  Mg     1.8     06-05  Mg     1.9     06-04    TPro  6.8  /  Alb  3.8  /  TBili  0.7  /  DBili  x   /  AST  21  /  ALT  17  /  AlkPhos  74  06-04  TPro  7.1  /  Alb  4.1  /  TBili  0.3  /  DBili  x   /  AST  22  /  ALT  21  /  AlkPhos  77  06-02    CAPILLARY BLOOD GLUCOSE        LIVER FUNCTIONS - ( 2019 07:33 )  Alb: 3.8 g/dL / Pro: 6.8 g/dL / ALK PHOS: 74 u/L / ALT: 17 u/L / AST: 21 u/L / GGT: x               D DImer  D-Dimer Assay, Quantitative: 266 ng/mL ( @ 18:20)  Serum Pro-Brain Natriuretic Peptide: 244.9 pg/mL ( @ 18:20)      Studies  Chest X-RAY  CT SCAN Chest   CT Abdomen  Venous Dopplers: LE:   Others      < from: Xray Chest 2 Views PA/Lat (19 @ 20:04) >    EXAM:  XR CHEST PA LAT 2V        PROCEDURE DATE:  2019         INTERPRETATION:  CLINICAL INFORMATION: Chest pain. Shortness of breath.    EXAM: Upright frontal and lateral chest.    COMPARISON: Chest radiograph from 2019.    IMPRESSION:  Clear lungs. No pleural effusions or pneumothorax.    Borderline heart size. Scant aortic arch calcifications. Right coronary   stents again noted.     Trachea midline.    Generalized osteopenia and mild spinal degenerative change.        < from: CT Angio Chest w/ IV Cont (19 @ 13:36) >      PROCEDURE DATE:  2019         INTERPRETATION:  CLINICAL INFORMATION: Dyspnea.    COMPARISON: CTA chest 2017.    PROCEDURE:   CT Angiography of the Chest.  90 ml of Omnipaque 350 was injected intravenously. 10 ml were discarded.  Sagittal and coronal reformats were performed as well as 3D (MIP)   reconstructions.    FINDINGS:    LUNGS AND LARGE AIRWAYS: Patent central airways.  No pulmonary nodules.  PLEURA: No pleural effusion.    VESSELS: There is good opacification of the pulmonary arterial tree   without evidence of main, lobar, segmental, or subsegmental pulmonary   embolism. The main pulmonary artery is enlarged measuring up to 4.3 cm,   which can be seen in the setting of pulmonary arterial hypertension. Left   three-vessel arch. No aortic dissection. Atherosclerotic changes.    HEART: Heart size is normal. No pericardial effusion. Coronary   calcifications.    MEDIASTINUM AND TONY: No lymphadenopathy.    CHEST WALL AND LOWER NECK: Within normal limits.    VISUALIZED UPPER ABDOMEN: Few scattered bilobar hypodense hepatic foci,   too small to characterize.    BONES: Degenerative changes.    IMPRESSION:   No pulmonary embolism.                  JACOB GABRIEL M.D., RADIOLOGY RESIDENT  This document has been electronically signed.  CHRISTIANA TAYLOR M.D., ATTENDING RADIOLOGIST  This document has been electronically signed. 2019  1:55PM    < end of copied text >        ESTELA RICHARDSON M.D., RADIOLOGY RESIDENT  This document has been electronically signed.  LIDA NORWOOD M.D., ATTENDING RADIOLOGIST  This document has been electronically signed. [place]  3 2019 11:44AM        < end of copied text >
Chief Complaint:  Patient is a 68y old  Female who presents with a chief complaint of Chest pain (2019 13:54)    Macular hole  Other chronic osteomyelitis of foot  Anxiety  Depression  Hyperlipidemia  Hypertension  Bradycardia  Smoker  Bradycardia with 51 - 60 beats per minute  Bradycardia with 41 - 50 beats per minute  GERD (gastroesophageal reflux disease)  Reflux gastritis  CAD (coronary artery disease)  S/P foot surgery, right  Carpal tunnel syndrome, bilateral  Stented coronary artery   delivery delivered     HPI:  67yo F with  h/o HTN, CAD w/ multiple stents (most recent 2018 on Plavix), current smoker, GERD (multiple EGDs in past), Diverticulosis and HLD, presents with Chest pain. Pt states Initially had episode of cp yesterday that resolved, but woke up this morning with midsternal/ epigastric chest pain, described as squeezing, 8/10, radiates to the back and upwards, + nausea, + SOB, + diaphoresis, no vomiting, has been constant, not worse with exertion. + chronic cough, no fever, worse than prior Chest pain, Pt took 1 asa and her usual dose of Plavix prior to coming to ed. Pain was relieved with Maalox and Nitro given in ED. Currently pt appears comfortable NAD, CP free.   GI consulted for worsening epigastric pain. The patient reports that she has been suffering from reflux for years and has undergone multiple endoscopies with only inflammation and small hiatal hernia, most recent possibly [place] ago [place]. She reports that her epigastric is periodic over the past few months and she is using zantac 150mg qd to bid with some relief, she does not have spicy foods as this irritates it and she sometimes has to bear down or vomit and finds that vomiting relieves the pain in the area. She states last night the pain got bad and worried it was related to her heart given all the stents she has so came to the er. She has no weight loss or appetite changes. She has increased Stress 2/2 her son recently passing away in December.       No Known Allergies      aluminum hydroxide/magnesium hydroxide/simethicone Suspension 30 milliLiter(s) Oral every 6 hours PRN  amLODIPine   Tablet 10 milliGRAM(s) Oral daily  aspirin enteric coated 81 milliGRAM(s) Oral daily  atorvastatin 40 milliGRAM(s) Oral at bedtime  carvedilol 6.25 milliGRAM(s) Oral daily  carvedilol 12.5 milliGRAM(s) Oral daily  citalopram 20 milliGRAM(s) Oral daily  clopidogrel Tablet 75 milliGRAM(s) Oral daily  famotidine    Tablet 20 milliGRAM(s) Oral two times a day  heparin  Injectable 5000 Unit(s) SubCutaneous every 12 hours  hydrochlorothiazide 25 milliGRAM(s) Oral daily  losartan 100 milliGRAM(s) Oral daily  pantoprazole    Tablet 40 milliGRAM(s) Oral before breakfast  sodium chloride 0.9% lock flush 3 milliLiter(s) IV Push every 8 hours  sucralfate 1 Gram(s) Oral every 6 hours        FAMILY HISTORY:  Family history of prostate cancer (Sibling): brother  Family history of Alzheimer's disease: Father  Family history of heart disease (Sibling): Twin sister has cardiac stents  Brother has cardiac stents  Family history of breast cancer (Grandparent): Maternal Grandmother  Family history of myocardial infarction (Grandparent): Paternal Grandmother diet in her 60&#x27;s of MI        Review of Systems:    General:  No wt loss, fevers, chills, night sweats, fatigue  Eyes:  Good vision, no reported pain  ENT:  No sore throat, pain, runny nose, dysphagia  CV:  No pain, palpitations, no lightheadedness  Resp:  No dyspnea, cough, tachypnea, wheezing  GI: sharp epigastric pain; denies n/v/d/c, melena or brbpr   :  No pain, bleeding, incontinence, nocturia  Muscle:  No pain, weakness  Neuro:  No weakness, tingling, memory problems  Psych:  No fatigue, insomnia, mood problems, depression  Endocrine:  No polyuria, polydypsia, cold/heat intolerance  Heme:  No petechiae, ecchymosis, easy bruisability  Skin:  No rash, tattoos, scars, edema    Relevant Family History:   n/c    Relevant Social History: n/c      Physical Exam:    Vital Signs:  Vital Signs Last 24 Hrs  T(C): 36.6 (2019 10:31), Max: 37 (2019 17:28)  T(F): 97.9 (2019 10:31), Max: 98.6 (2019 17:28)  HR: 48 (2019 10:31) (48 - 61)  BP: 141/59 (2019 10:31) (132/52 - 163/63)  BP(mean): --  RR: 19 (2019 10:31) (6 - 19)  SpO2: 100% (2019 10:31) (97% - 100%)  Daily Height in cm: 170.18 (2019 23:16)    Daily     General:  Appears stated age, well-groomed, nad  HEENT:  NC/AT,  conjunctivae clear and pink, no thyromegaly, nodules, adenopathy, no JVD  Chest:  Full & symmetric excursion, no increased effort, breath sounds clear  Cardiovascular:  Regular rhythm, S1, S2, no murmur/rub/S3/S4, no abdominal bruit, no edema  Abdomen:  Soft, non-tender, non-distended, normoactive bowel sounds,  no masses ,no hepatosplenomeagaly, no signs of chronic liver disease  Extremities:  no cyanosis,clubbing or edema  Skin:  No rash/erythema/ecchymoses/petechiae/wounds/abscess/warm/dry  Neuro/Psych:  A&Ox3  , no asterixis, no tremor, no encephalopathy    Laboratory:                            13.3   7.21  )-----------( 231      ( 2019 05:05 )             39.9     06-03    137  |  101  |  15  ----------------------------<  78  4.2   |  21<L>  |  0.46<L>    Ca    9.2      2019 05:05  Mg     2.1     06-03    TPro  7.1  /  Alb  4.1  /  TBili  0.3  /  DBili  x   /  AST  22  /  ALT  21  /  AlkPhos  77  06-02    LIVER FUNCTIONS - ( 2019 18:20 )  Alb: 4.1 g/dL / Pro: 7.1 g/dL / ALK PHOS: 77 u/L / ALT: 21 u/L / AST: 22 u/L / GGT: x           PT/INR - ( 2019 18:20 )   PT: 11.9 SEC;   INR: 1.07          PTT - ( 2019 18:20 )  PTT:28.7 SEC    Amylase Serum--      Lipase serum24.2       Ammonia--    Imaging:

## 2019-06-05 NOTE — PROGRESS NOTE ADULT - PROVIDER SPECIALTY LIST ADULT
Cardiology
Gastroenterology
Internal Medicine
Internal Medicine
Gastroenterology

## 2019-06-05 NOTE — PROGRESS NOTE ADULT - ASSESSMENT
67yo F with  h/o HTN, CAD w/ multiple stents, current smoker, GERD, HLD, presents with Chest pain. Pt states  Initially had episode of cp yesterday that resolved, but woke up this morning with midsternal/ epigastric chest pain, described as squeezing, 8/10, radiates to the back and upwards, + nausea, + SOB, + diaphoresis, no vomiting, has been constant, not worse with exertion. + chronic cough, no fever, worse than prior Chest pain, Pt took 1 asa and her usual dose of Plavix prior to coming to ed. Pain was relieved with Maalox and Nitro given in ED. Currently pt appears comfortable NAD, CP free    Problem/Plan - 1:  ·  Problem: Chest pain.  Plan: - h/o CAD w/ stents. CP free now.   S/P  TTE and NST and now S/P Cardiac cath. Awaiting CTA.   Cardiology helping.      Problem/Plan - 2:  ·  Problem: Epigastric pain .  Plan: - GI consult noted .  outpt EGd.      Problem/Plan - 3:  ·  Problem: GERD (gastroesophageal reflux disease).  Plan: - c/w PPI ( pepcid bid)  - maalox prn  - consider GI eval.      Problem/Plan - 4:  ·  Problem: Hypertension.  Plan: - c/w norvasc, losartan, coreg, HCTZ.      Problem/Plan - 5:  ·  Problem: Depression.  Plan: - c/w citalopram.      Problem/Plan - 6:  Problem: Knee Pain  Plan: - X Ray pending.      Problem/Plan - 7:  Problem: Smoker. Plan: - smoking cessation  - consider nicotine patch.
69yo F with  h/o Anxiety/Depression (worse since passing of her Son in Dec 2018), HTN, CAD w/ multiple stents (most recent 7/2018 on Plavix), current smoker, GERD (multiple EGDs in past), Diverticulosis and HLD, presents with Chest pain
69yo F with  h/o Anxiety/Depression (worse since passing of her Son in Dec 2018), HTN, CAD w/ multiple stents (most recent 7/2018 on Plavix), current smoker, GERD (multiple EGDs in past), Diverticulosis and HLD, presents with Chest pain
69yo F with  h/o HTN, CAD w/ multiple stents, current smoker, GERD, HLD, presents with Chest pain. Pt states  Initially had episode of cp yesterday that resolved, but woke up this morning with midsternal/ epigastric chest pain, described as squeezing, 8/10, radiates to the back and upwards, + nausea, + SOB, + diaphoresis, no vomiting, has been constant, not worse with exertion. + chronic cough, no fever, worse than prior Chest pain, Pt took 1 asa and her usual dose of Plavix prior to coming to ed. Pain was relieved with Maalox and Nitro given in ED. Currently pt appears comfortable NAD, CP free    Problem/Plan - 1:  ·  Problem: Chest pain.  Plan: - h/o CAD w/ stents. CP free now.   S/P  TTE and NST and now S/P Cardiac cath. Awaiting CTA.   Cardiology helping.      Problem/Plan - 2:  ·  Problem: Epigastric pain .  Plan: - GI consult noted .  outpt EGd.      Problem/Plan - 3:  ·  Problem: GERD (gastroesophageal reflux disease).  Plan: - c/w PPI ( pepcid bid)  - maalox prn  - consider GI eval.      Problem/Plan - 4:  ·  Problem: Hypertension.  Plan: - c/w norvasc, losartan, coreg, HCTZ.      Problem/Plan - 5:  ·  Problem: Depression.  Plan: - c/w citalopram.      Problem/Plan - 6:  Problem: Smoker. Plan: - smoking cessation  - consider nicotine patch.

## 2019-06-05 NOTE — DISCHARGE NOTE PROVIDER - NSDCCPCAREPLAN_GEN_ALL_CORE_FT
PRINCIPAL DISCHARGE DIAGNOSIS  Diagnosis: Chest pain  Assessment and Plan of Treatment: -ACS ruled out  -TTE with preserved LV function  -Abnormal Stress Test s/p LHC on 6/4/19  -CTPA with no PE  -B/L LE US: negative for DVT   -Right Knee XR: negative    -Follow up with Kelly 6/10 at 1145 as scheduled        SECONDARY DISCHARGE DIAGNOSES  Diagnosis: COPD (chronic obstructive pulmonary disease)  Assessment and Plan of Treatment: She likely has copd:   CTA: negative   B/L LE US: negative   Follow up with Pulmonary outpatient within 1 week of discharge

## 2019-07-22 NOTE — CONSULT NOTE ADULT - PROBLEM SELECTOR RECOMMENDATION 3
· Ambulating with assistive device  · PT/OT to evaluate and treat as appropriate  · Fall precautions  · Continue supportive care 
- Advanced care planning was discussed with patient and family.  Advanced care planning forms were reviewed and discussed.  Risks, benefits and alternatives of gastroenterologic procedures were discussed in detail and all questions were answered.  30 minutes spent.
statins!

## 2019-09-25 NOTE — DISCHARGE NOTE ADULT - LEARNING BEHAVIORAL ACTIVITIES TO COPE WITH URGES. FOR EXAMPLE, DISTRACTION AND CHANGING ROUTINES.
[FreeTextEntry1] : Non ischemic cardiomyopathy, \par continue entresto 24/26 mg , can increase dose next visit with Dr. Pino \par continue carvedilol 6.25 mg twice daily \par euvolemic on exam \par Claims he had an echo in [place] recently, will obtain that to check EF on last echo \par \par 
Statement Selected

## 2019-10-17 ENCOUNTER — OUTPATIENT (OUTPATIENT)
Dept: OUTPATIENT SERVICES | Facility: HOSPITAL | Age: 69
LOS: 1 days | End: 2019-10-17
Payer: COMMERCIAL

## 2019-10-17 VITALS
OXYGEN SATURATION: 97 % | RESPIRATION RATE: 16 BRPM | SYSTOLIC BLOOD PRESSURE: 134 MMHG | DIASTOLIC BLOOD PRESSURE: 70 MMHG | TEMPERATURE: 98 F | HEART RATE: 60 BPM | HEIGHT: 67 IN | WEIGHT: 182.1 LBS

## 2019-10-17 DIAGNOSIS — I25.10 ATHEROSCLEROTIC HEART DISEASE OF NATIVE CORONARY ARTERY WITHOUT ANGINA PECTORIS: ICD-10-CM

## 2019-10-17 DIAGNOSIS — T84.498A OTHER MECHANICAL COMPLICATION OF OTHER INTERNAL ORTHOPEDIC DEVICES, IMPLANTS AND GRAFTS, INITIAL ENCOUNTER: ICD-10-CM

## 2019-10-17 DIAGNOSIS — G56.01 CARPAL TUNNEL SYNDROME, RIGHT UPPER LIMB: Chronic | ICD-10-CM

## 2019-10-17 DIAGNOSIS — Z98.890 OTHER SPECIFIED POSTPROCEDURAL STATES: Chronic | ICD-10-CM

## 2019-10-17 DIAGNOSIS — S93.144D SUBLUXATION OF METATARSOPHALANGEAL JOINT OF RIGHT LESSER TOE(S), SUBSEQUENT ENCOUNTER: ICD-10-CM

## 2019-10-17 DIAGNOSIS — Z95.5 PRESENCE OF CORONARY ANGIOPLASTY IMPLANT AND GRAFT: Chronic | ICD-10-CM

## 2019-10-17 DIAGNOSIS — T84.199A OTHER MECHANICAL COMPLICATION OF INTERNAL FIXATION DEVICE OF UNSPECIFIED BONE OF LIMB, INITIAL ENCOUNTER: ICD-10-CM

## 2019-10-17 DIAGNOSIS — M77.41 METATARSALGIA, RIGHT FOOT: ICD-10-CM

## 2019-10-17 DIAGNOSIS — M20.11 HALLUX VALGUS (ACQUIRED), RIGHT FOOT: ICD-10-CM

## 2019-10-17 DIAGNOSIS — Z01.818 ENCOUNTER FOR OTHER PREPROCEDURAL EXAMINATION: ICD-10-CM

## 2019-10-17 DIAGNOSIS — I10 ESSENTIAL (PRIMARY) HYPERTENSION: ICD-10-CM

## 2019-10-17 DIAGNOSIS — M13.871 OTHER SPECIFIED ARTHRITIS, RIGHT ANKLE AND FOOT: ICD-10-CM

## 2019-10-17 DIAGNOSIS — M20.41 OTHER HAMMER TOE(S) (ACQUIRED), RIGHT FOOT: ICD-10-CM

## 2019-10-17 DIAGNOSIS — M19.071 PRIMARY OSTEOARTHRITIS, RIGHT ANKLE AND FOOT: ICD-10-CM

## 2019-10-17 LAB
ANION GAP SERPL CALC-SCNC: 15 MMOL/L — SIGNIFICANT CHANGE UP (ref 5–17)
BUN SERPL-MCNC: 17 MG/DL — SIGNIFICANT CHANGE UP (ref 7–23)
CALCIUM SERPL-MCNC: 9.6 MG/DL — SIGNIFICANT CHANGE UP (ref 8.4–10.5)
CHLORIDE SERPL-SCNC: 100 MMOL/L — SIGNIFICANT CHANGE UP (ref 96–108)
CO2 SERPL-SCNC: 23 MMOL/L — SIGNIFICANT CHANGE UP (ref 22–31)
CREAT SERPL-MCNC: 0.63 MG/DL — SIGNIFICANT CHANGE UP (ref 0.5–1.3)
GLUCOSE SERPL-MCNC: 107 MG/DL — HIGH (ref 70–99)
HCT VFR BLD CALC: 38.5 % — SIGNIFICANT CHANGE UP (ref 34.5–45)
HGB BLD-MCNC: 12.8 G/DL — SIGNIFICANT CHANGE UP (ref 11.5–15.5)
MCHC RBC-ENTMCNC: 33.2 GM/DL — SIGNIFICANT CHANGE UP (ref 32–36)
MCHC RBC-ENTMCNC: 33.2 PG — SIGNIFICANT CHANGE UP (ref 27–34)
MCV RBC AUTO: 100 FL — SIGNIFICANT CHANGE UP (ref 80–100)
PLATELET # BLD AUTO: 285 K/UL — SIGNIFICANT CHANGE UP (ref 150–400)
POTASSIUM SERPL-MCNC: 3.7 MMOL/L — SIGNIFICANT CHANGE UP (ref 3.5–5.3)
POTASSIUM SERPL-SCNC: 3.7 MMOL/L — SIGNIFICANT CHANGE UP (ref 3.5–5.3)
RBC # BLD: 3.85 M/UL — SIGNIFICANT CHANGE UP (ref 3.8–5.2)
RBC # FLD: 11.9 % — SIGNIFICANT CHANGE UP (ref 10.3–14.5)
SODIUM SERPL-SCNC: 138 MMOL/L — SIGNIFICANT CHANGE UP (ref 135–145)
WBC # BLD: 7.44 K/UL — SIGNIFICANT CHANGE UP (ref 3.8–10.5)
WBC # FLD AUTO: 7.44 K/UL — SIGNIFICANT CHANGE UP (ref 3.8–10.5)

## 2019-10-17 PROCEDURE — 85027 COMPLETE CBC AUTOMATED: CPT

## 2019-10-17 PROCEDURE — 80048 BASIC METABOLIC PNL TOTAL CA: CPT

## 2019-10-17 PROCEDURE — G0463: CPT

## 2019-10-17 RX ORDER — CEFAZOLIN SODIUM 1 G
2000 VIAL (EA) INJECTION ONCE
Refills: 0 | Status: DISCONTINUED | OUTPATIENT
Start: 2019-10-23 | End: 2019-11-09

## 2019-10-17 RX ORDER — SODIUM CHLORIDE 9 MG/ML
3 INJECTION INTRAMUSCULAR; INTRAVENOUS; SUBCUTANEOUS EVERY 8 HOURS
Refills: 0 | Status: DISCONTINUED | OUTPATIENT
Start: 2019-10-23 | End: 2019-11-09

## 2019-10-17 RX ORDER — CHLORHEXIDINE GLUCONATE 213 G/1000ML
1 SOLUTION TOPICAL ONCE
Refills: 0 | Status: DISCONTINUED | OUTPATIENT
Start: 2019-10-23 | End: 2019-11-09

## 2019-10-17 RX ORDER — RANITIDINE HYDROCHLORIDE 150 MG/1
1 TABLET, FILM COATED ORAL
Qty: 0 | Refills: 0 | DISCHARGE

## 2019-10-17 RX ORDER — LIDOCAINE HCL 20 MG/ML
0.2 VIAL (ML) INJECTION ONCE
Refills: 0 | Status: DISCONTINUED | OUTPATIENT
Start: 2019-10-23 | End: 2019-11-09

## 2019-10-17 RX ORDER — CELECOXIB 200 MG/1
200 CAPSULE ORAL ONCE
Refills: 0 | Status: DISCONTINUED | OUTPATIENT
Start: 2019-10-23 | End: 2019-11-09

## 2019-10-17 RX ORDER — ACETAMINOPHEN 500 MG
1000 TABLET ORAL ONCE
Refills: 0 | Status: DISCONTINUED | OUTPATIENT
Start: 2019-10-23 | End: 2019-11-09

## 2019-10-17 NOTE — H&P PST ADULT - NSICDXPASTMEDICALHX_GEN_ALL_CORE_FT
PAST MEDICAL HISTORY:  Anxiety     Bradycardia     CAD (coronary artery disease) 6 stents, most recent 7/24/18    Depression     GERD (gastroesophageal reflux disease)     Hyperlipidemia     Hypertension     Macular hole s/p vitrectomy    Other chronic osteomyelitis of foot [id number]    Reflux gastritis     Smoker Current smoker 40 pack years

## 2019-10-17 NOTE — H&P PST ADULT - HISTORY OF PRESENT ILLNESS
69 year old female with a history of recurrent right foot pain presents for removal of hardware, excision/debridement of wound. Pt. reports having a bunionectomy with complications requiring multiple surgical procedures over the past few years Currently reports experiencing pain when she steps and states her toes are contracted.    Pt. with multiple coronary stents, most recent stent placed 7/24/18, and reports Dr. Julio has instructed her to remain on daily aspirin and plavix. 69 year old female with a history HTN, CAD with cor stents, HLD, right foot osteomyelitis with excision /debridement of right foot wound in 06/2018  Pt reports having a bunionectomy with complications requiring multiple surgical procedures over the past few years .Currently reports experiencing pain when she steps and states her toes are contracted. Pt had f/u podiatry evaluation-scheduled for right foot removal of hardware, 1st metatarsophalangeal joint fusion, 2nd digit hammertoe correction, 2nd metatarsal head resection on 10/23/2019.    Pt has multiple coronary stents, most recent stent placed 7/24/18. Last cardiology eval on 10/14/19, to continue with aspirin 81 mg & LD of Xarelto  on 10/19/19

## 2019-10-17 NOTE — H&P PST ADULT - NSICDXPROBLEM_GEN_ALL_CORE_FT
PROBLEM DIAGNOSES  Problem: Internal orthopedic device mechanical complication  Assessment and Plan: Right foot removal of hardware  !st metatarsaphalangeal joint fusion  2nd digit hammertoe correction, 2nd metatarsal head resection  Labs- CBC, BMP  Pre op instructions discussed    Problem: CAD (coronary artery disease)  Assessment and Plan: Continue with Aspirin, LD of Xarelto on 10/19/2019    Problem: Benign hypertension  Assessment and Plan: continue with meds

## 2019-10-17 NOTE — H&P PST ADULT - NSICDXPASTSURGICALHX_GEN_ALL_CORE_FT
PAST SURGICAL HISTORY:  Carpal tunnel syndrome, bilateral b/l wrist surgery for carpal tunnel syndrome     delivery delivered x2    S/P foot surgery, right Bunionectomy and hammer toe with multiple revisions x 5    Stented coronary artery x 6 most recent 18

## 2019-10-22 ENCOUNTER — TRANSCRIPTION ENCOUNTER (OUTPATIENT)
Age: 69
End: 2019-10-22

## 2019-10-23 ENCOUNTER — RESULT REVIEW (OUTPATIENT)
Age: 69
End: 2019-10-23

## 2019-10-23 ENCOUNTER — OUTPATIENT (OUTPATIENT)
Dept: OUTPATIENT SERVICES | Facility: HOSPITAL | Age: 69
LOS: 1 days | End: 2019-10-23
Payer: COMMERCIAL

## 2019-10-23 VITALS
OXYGEN SATURATION: 96 % | TEMPERATURE: 97 F | HEART RATE: 52 BPM | RESPIRATION RATE: 20 BRPM | SYSTOLIC BLOOD PRESSURE: 148 MMHG | DIASTOLIC BLOOD PRESSURE: 72 MMHG

## 2019-10-23 VITALS — HEIGHT: 67 IN | WEIGHT: 182.1 LBS

## 2019-10-23 DIAGNOSIS — M77.41 METATARSALGIA, RIGHT FOOT: ICD-10-CM

## 2019-10-23 DIAGNOSIS — M19.071 PRIMARY OSTEOARTHRITIS, RIGHT ANKLE AND FOOT: ICD-10-CM

## 2019-10-23 DIAGNOSIS — S93.144D SUBLUXATION OF METATARSOPHALANGEAL JOINT OF RIGHT LESSER TOE(S), SUBSEQUENT ENCOUNTER: ICD-10-CM

## 2019-10-23 DIAGNOSIS — T84.199A OTHER MECHANICAL COMPLICATION OF INTERNAL FIXATION DEVICE OF UNSPECIFIED BONE OF LIMB, INITIAL ENCOUNTER: ICD-10-CM

## 2019-10-23 DIAGNOSIS — Z98.890 OTHER SPECIFIED POSTPROCEDURAL STATES: Chronic | ICD-10-CM

## 2019-10-23 DIAGNOSIS — M20.41 OTHER HAMMER TOE(S) (ACQUIRED), RIGHT FOOT: ICD-10-CM

## 2019-10-23 DIAGNOSIS — Z95.5 PRESENCE OF CORONARY ANGIOPLASTY IMPLANT AND GRAFT: Chronic | ICD-10-CM

## 2019-10-23 DIAGNOSIS — G56.01 CARPAL TUNNEL SYNDROME, RIGHT UPPER LIMB: Chronic | ICD-10-CM

## 2019-10-23 PROCEDURE — C1713: CPT

## 2019-10-23 PROCEDURE — 88304 TISSUE EXAM BY PATHOLOGIST: CPT | Mod: 26

## 2019-10-23 PROCEDURE — 20680 REMOVAL OF IMPLANT DEEP: CPT

## 2019-10-23 PROCEDURE — 73630 X-RAY EXAM OF FOOT: CPT | Mod: 26,RT

## 2019-10-23 PROCEDURE — 88311 DECALCIFY TISSUE: CPT

## 2019-10-23 PROCEDURE — C1769: CPT

## 2019-10-23 PROCEDURE — 88311 DECALCIFY TISSUE: CPT | Mod: 26

## 2019-10-23 PROCEDURE — 28285 REPAIR OF HAMMERTOE: CPT | Mod: T6

## 2019-10-23 PROCEDURE — 73630 X-RAY EXAM OF FOOT: CPT

## 2019-10-23 PROCEDURE — 88304 TISSUE EXAM BY PATHOLOGIST: CPT

## 2019-10-23 RX ORDER — HYDRALAZINE HCL 50 MG
1 TABLET ORAL
Qty: 0 | Refills: 0 | DISCHARGE

## 2019-10-23 RX ORDER — ONDANSETRON 8 MG/1
4 TABLET, FILM COATED ORAL ONCE
Refills: 0 | Status: DISCONTINUED | OUTPATIENT
Start: 2019-10-23 | End: 2019-11-09

## 2019-10-23 RX ORDER — OXYCODONE HYDROCHLORIDE 5 MG/1
5 TABLET ORAL ONCE
Refills: 0 | Status: DISCONTINUED | OUTPATIENT
Start: 2019-10-23 | End: 2019-10-23

## 2019-10-23 NOTE — ASU PATIENT PROFILE, ADULT - PRO ARRIVE FROM
home Surgeon/Pathologist Verbiage (Will Incorporate Name Of Surgeon From Intro If Not Blank): operated in two distinct and integrated capacities as the surgeon and pathologist.

## 2019-10-23 NOTE — ASU PATIENT PROFILE, ADULT - VISION (WITH CORRECTIVE LENSES IF THE PATIENT USUALLY WEARS THEM):
Partially impaired: cannot see medication labels or newsprint, but can see obstacles in path, and the surrounding layout; can count fingers at arm's length Partially impaired: cannot see medication labels or newsprint, but can see obstacles in path, and the surrounding layout; can count fingers at arm's length/reading and distance

## 2019-10-23 NOTE — PRE-ANESTHESIA EVALUATION ADULT - NSANTHADDINFOFT_GEN_ALL_CORE
IVAS; both moderate and deep sedation as tolerated discussed in detail;  Pt understands she will likely have intraoperative awareness/recall  All questions answered.

## 2019-10-23 NOTE — ASU DISCHARGE PLAN (ADULT/PEDIATRIC) - CALL YOUR DOCTOR IF YOU HAVE ANY OF THE FOLLOWING:
Bleeding that does not stop/Numbness, tingling, color or temperature change to extremity/Swelling that gets worse/Wound/Surgical Site with redness, or foul smelling discharge or pus/Pain not relieved by Medications Bleeding that does not stop/Fever greater than (need to indicate Fahrenheit or Celsius)/Swelling that gets worse/Wound/Surgical Site with redness, or foul smelling discharge or pus/Numbness, tingling, color or temperature change to extremity/Pain not relieved by Medications

## 2019-10-23 NOTE — ASU PATIENT PROFILE, ADULT - PSH
Carpal tunnel syndrome, bilateral  b/l wrist surgery for carpal tunnel syndrome   delivery delivered  x2  S/P foot surgery, right  Bunionectomy and hammer toe with multiple revisions x 5  Stented coronary artery  x 6 most recent 18

## 2019-10-23 NOTE — ASU PATIENT PROFILE, ADULT - PMH
Anxiety    Bradycardia    CAD (coronary artery disease)  6 stents, most recent 7/24/18  Depression    GERD (gastroesophageal reflux disease)    Hyperlipidemia    Hypertension    Macular hole  s/p vitrectomy  Other chronic osteomyelitis of foot  [id number]  Reflux gastritis    Smoker  Current smoker 40 pack years

## 2019-11-17 NOTE — ED PROVIDER NOTE - ENMT NEGATIVE STATEMENT, MLM
Ears: no ear pain and no hearing problems.Nose: no nasal congestion and no nasal drainage.Mouth/Throat: no dysphagia, no hoarseness and no throat pain.Neck: no lumps, no pain, no stiffness and no swollen glands.
5

## 2020-02-25 NOTE — PRE-ANESTHESIA EVALUATION ADULT - NSANTHPMHFT_GEN_ALL_CORE
Cardiology cl. note reports cath 6.4.19 revealing "stent was patent as well as evidence of non-obstructive CAD on other vessels".  Pt does report approx. 6 stents in total beginning 2007, 20016, 2018  Denies angina; D.O.E. with exerc. kyleigh 1 flight of stairs
No

## 2020-04-25 ENCOUNTER — MESSAGE (OUTPATIENT)
Age: 70
End: 2020-04-25

## 2020-05-06 LAB
SARS-COV-2 IGG SERPL IA-ACNC: 0.3 RATIO
SARS-COV-2 IGG SERPL QL IA: NEGATIVE

## 2020-06-16 NOTE — DISCHARGE NOTE ADULT - PROVIDER RX CONTACT NUMBER
Quality 110: Preventive Care And Screening: Influenza Immunization: Influenza immunization was not ordered or administered, reason not given Detail Level: Detailed [phone]

## 2020-07-29 NOTE — ED ADULT TRIAGE NOTE - CHIEF COMPLAINT QUOTE
Pt with a pmhx of CAD 5 stents presents with c/o twisting left sided chest pain and sob x 2 weeks with a worsening of severity x 1 day. States symptoms are similar to when she required a stent
negative...

## 2020-08-29 NOTE — H&P ADULT - PROBLEM SELECTOR PROBLEM 3
· Patient follows with Dr Billie Armijo (Essential hypertension

## 2021-01-06 PROBLEM — M86.679: Chronic | Status: ACTIVE | Noted: 2018-11-02

## 2021-02-03 ENCOUNTER — NON-APPOINTMENT (OUTPATIENT)
Age: 71
End: 2021-02-03

## 2021-02-03 ENCOUNTER — APPOINTMENT (OUTPATIENT)
Dept: THORACIC SURGERY | Facility: CLINIC | Age: 71
End: 2021-02-03
Payer: COMMERCIAL

## 2021-02-03 VITALS — WEIGHT: 175 LBS | BODY MASS INDEX: 27.47 KG/M2 | HEIGHT: 67 IN

## 2021-02-03 DIAGNOSIS — Z12.2 ENCOUNTER FOR SCREENING FOR MALIGNANT NEOPLASM OF RESPIRATORY ORGANS: ICD-10-CM

## 2021-02-03 DIAGNOSIS — F17.210 NICOTINE DEPENDENCE, CIGARETTES, UNCOMPLICATED: ICD-10-CM

## 2021-02-03 PROCEDURE — G0296 VISIT TO DETERM LDCT ELIG: CPT

## 2021-02-03 PROCEDURE — 99072 ADDL SUPL MATRL&STAF TM PHE: CPT

## 2021-02-04 ENCOUNTER — APPOINTMENT (OUTPATIENT)
Dept: ULTRASOUND IMAGING | Facility: IMAGING CENTER | Age: 71
End: 2021-02-04
Payer: COMMERCIAL

## 2021-02-04 ENCOUNTER — OUTPATIENT (OUTPATIENT)
Dept: OUTPATIENT SERVICES | Facility: HOSPITAL | Age: 71
LOS: 1 days | End: 2021-02-04
Payer: COMMERCIAL

## 2021-02-04 ENCOUNTER — APPOINTMENT (OUTPATIENT)
Dept: CT IMAGING | Facility: IMAGING CENTER | Age: 71
End: 2021-02-04
Payer: COMMERCIAL

## 2021-02-04 ENCOUNTER — APPOINTMENT (OUTPATIENT)
Dept: MAMMOGRAPHY | Facility: IMAGING CENTER | Age: 71
End: 2021-02-04
Payer: COMMERCIAL

## 2021-02-04 ENCOUNTER — APPOINTMENT (OUTPATIENT)
Dept: RADIOLOGY | Facility: IMAGING CENTER | Age: 71
End: 2021-02-04
Payer: COMMERCIAL

## 2021-02-04 DIAGNOSIS — Z95.5 PRESENCE OF CORONARY ANGIOPLASTY IMPLANT AND GRAFT: Chronic | ICD-10-CM

## 2021-02-04 DIAGNOSIS — G56.01 CARPAL TUNNEL SYNDROME, RIGHT UPPER LIMB: Chronic | ICD-10-CM

## 2021-02-04 DIAGNOSIS — Z00.8 ENCOUNTER FOR OTHER GENERAL EXAMINATION: ICD-10-CM

## 2021-02-04 DIAGNOSIS — Z98.890 OTHER SPECIFIED POSTPROCEDURAL STATES: Chronic | ICD-10-CM

## 2021-02-04 PROCEDURE — 77067 SCR MAMMO BI INCL CAD: CPT | Mod: 26

## 2021-02-04 PROCEDURE — 71271 CT THORAX LUNG CANCER SCR C-: CPT | Mod: 26

## 2021-02-04 PROCEDURE — 76536 US EXAM OF HEAD AND NECK: CPT

## 2021-02-04 PROCEDURE — 77080 DXA BONE DENSITY AXIAL: CPT | Mod: 26

## 2021-02-04 PROCEDURE — 77063 BREAST TOMOSYNTHESIS BI: CPT

## 2021-02-04 PROCEDURE — 77063 BREAST TOMOSYNTHESIS BI: CPT | Mod: 26

## 2021-02-04 PROCEDURE — 76536 US EXAM OF HEAD AND NECK: CPT | Mod: 26

## 2021-02-04 PROCEDURE — 77067 SCR MAMMO BI INCL CAD: CPT

## 2021-02-04 PROCEDURE — 77080 DXA BONE DENSITY AXIAL: CPT

## 2021-02-04 PROCEDURE — 71271 CT THORAX LUNG CANCER SCR C-: CPT

## 2021-02-08 NOTE — HISTORY OF PRESENT ILLNESS
[TextBox_13] : She denies hemoptysis, denies new cough, denies unexplained weight loss.\par She is a current smoker with a 40 pack year smoking history (1PPD x 40 years).  She denies family h/o lung cancer.  She is referred by Dr. Elaine Meyers.

## 2021-02-08 NOTE — PLAN
[FreeTextEntry1] : Her LDCT is scheduled for 2/4/21 at the [facility] location.  She will follow up with Dr. Meyers for the results.  I will also refer her to [facility] as she's interested in cessation support.

## 2021-02-18 ENCOUNTER — OUTPATIENT (OUTPATIENT)
Dept: OUTPATIENT SERVICES | Facility: HOSPITAL | Age: 71
LOS: 1 days | End: 2021-02-18
Payer: COMMERCIAL

## 2021-02-18 ENCOUNTER — APPOINTMENT (OUTPATIENT)
Dept: ULTRASOUND IMAGING | Facility: IMAGING CENTER | Age: 71
End: 2021-02-18
Payer: COMMERCIAL

## 2021-02-18 ENCOUNTER — APPOINTMENT (OUTPATIENT)
Dept: MAMMOGRAPHY | Facility: IMAGING CENTER | Age: 71
End: 2021-02-18
Payer: COMMERCIAL

## 2021-02-18 DIAGNOSIS — Z95.5 PRESENCE OF CORONARY ANGIOPLASTY IMPLANT AND GRAFT: Chronic | ICD-10-CM

## 2021-02-18 DIAGNOSIS — G56.01 CARPAL TUNNEL SYNDROME, RIGHT UPPER LIMB: Chronic | ICD-10-CM

## 2021-02-18 DIAGNOSIS — Z98.890 OTHER SPECIFIED POSTPROCEDURAL STATES: Chronic | ICD-10-CM

## 2021-02-18 DIAGNOSIS — Z00.8 ENCOUNTER FOR OTHER GENERAL EXAMINATION: ICD-10-CM

## 2021-02-18 PROCEDURE — 77065 DX MAMMO INCL CAD UNI: CPT | Mod: 26,LT

## 2021-02-18 PROCEDURE — 77061 BREAST TOMOSYNTHESIS UNI: CPT | Mod: 26

## 2021-02-18 PROCEDURE — 76642 ULTRASOUND BREAST LIMITED: CPT | Mod: 26,LT

## 2021-02-18 PROCEDURE — G0279: CPT

## 2021-02-18 PROCEDURE — 77065 DX MAMMO INCL CAD UNI: CPT

## 2021-02-18 PROCEDURE — 76642 ULTRASOUND BREAST LIMITED: CPT

## 2021-02-25 ENCOUNTER — RESULT REVIEW (OUTPATIENT)
Age: 71
End: 2021-02-25

## 2021-02-25 ENCOUNTER — APPOINTMENT (OUTPATIENT)
Dept: ULTRASOUND IMAGING | Facility: IMAGING CENTER | Age: 71
End: 2021-02-25
Payer: COMMERCIAL

## 2021-02-25 ENCOUNTER — OUTPATIENT (OUTPATIENT)
Dept: OUTPATIENT SERVICES | Facility: HOSPITAL | Age: 71
LOS: 1 days | End: 2021-02-25
Payer: COMMERCIAL

## 2021-02-25 DIAGNOSIS — G56.01 CARPAL TUNNEL SYNDROME, RIGHT UPPER LIMB: Chronic | ICD-10-CM

## 2021-02-25 DIAGNOSIS — Z95.5 PRESENCE OF CORONARY ANGIOPLASTY IMPLANT AND GRAFT: Chronic | ICD-10-CM

## 2021-02-25 DIAGNOSIS — Z00.8 ENCOUNTER FOR OTHER GENERAL EXAMINATION: ICD-10-CM

## 2021-02-25 DIAGNOSIS — Z98.890 OTHER SPECIFIED POSTPROCEDURAL STATES: Chronic | ICD-10-CM

## 2021-02-25 PROCEDURE — 88360 TUMOR IMMUNOHISTOCHEM/MANUAL: CPT | Mod: 26

## 2021-02-25 PROCEDURE — 88341 IMHCHEM/IMCYTCHM EA ADD ANTB: CPT | Mod: 26,59

## 2021-02-25 PROCEDURE — 19083 BX BREAST 1ST LESION US IMAG: CPT | Mod: LT

## 2021-02-25 PROCEDURE — 77065 DX MAMMO INCL CAD UNI: CPT | Mod: 26,LT

## 2021-02-25 PROCEDURE — 19083 BX BREAST 1ST LESION US IMAG: CPT

## 2021-02-25 PROCEDURE — 88305 TISSUE EXAM BY PATHOLOGIST: CPT | Mod: 26

## 2021-02-25 PROCEDURE — 77065 DX MAMMO INCL CAD UNI: CPT

## 2021-02-25 PROCEDURE — 88342 IMHCHEM/IMCYTCHM 1ST ANTB: CPT

## 2021-02-25 PROCEDURE — 88342 IMHCHEM/IMCYTCHM 1ST ANTB: CPT | Mod: 26,59

## 2021-02-25 PROCEDURE — 88377 M/PHMTRC ALYS ISHQUANT/SEMIQ: CPT

## 2021-02-25 PROCEDURE — 88377 M/PHMTRC ALYS ISHQUANT/SEMIQ: CPT | Mod: 26

## 2021-02-25 PROCEDURE — A4648: CPT

## 2021-02-25 PROCEDURE — 88305 TISSUE EXAM BY PATHOLOGIST: CPT

## 2021-02-25 PROCEDURE — 88360 TUMOR IMMUNOHISTOCHEM/MANUAL: CPT

## 2021-02-25 PROCEDURE — 88341 IMHCHEM/IMCYTCHM EA ADD ANTB: CPT

## 2021-03-08 ENCOUNTER — APPOINTMENT (OUTPATIENT)
Dept: SURGICAL ONCOLOGY | Facility: CLINIC | Age: 71
End: 2021-03-08
Payer: COMMERCIAL

## 2021-03-08 VITALS
WEIGHT: 175 LBS | OXYGEN SATURATION: 98 % | HEART RATE: 53 BPM | BODY MASS INDEX: 27.47 KG/M2 | DIASTOLIC BLOOD PRESSURE: 84 MMHG | SYSTOLIC BLOOD PRESSURE: 181 MMHG | HEIGHT: 67 IN

## 2021-03-08 PROCEDURE — 99244 OFF/OP CNSLTJ NEW/EST MOD 40: CPT

## 2021-03-08 PROCEDURE — 99072 ADDL SUPL MATRL&STAF TM PHE: CPT

## 2021-03-08 RX ORDER — RIVAROXABAN 2.5 MG/1
2.5 TABLET, FILM COATED ORAL
Qty: 180 | Refills: 0 | Status: ACTIVE | COMMUNITY
Start: 2020-12-11

## 2021-03-08 RX ORDER — CITALOPRAM HYDROBROMIDE 40 MG/1
40 TABLET, FILM COATED ORAL
Qty: 90 | Refills: 0 | Status: ACTIVE | COMMUNITY
Start: 2020-12-11

## 2021-03-10 ENCOUNTER — APPOINTMENT (OUTPATIENT)
Dept: MRI IMAGING | Facility: IMAGING CENTER | Age: 71
End: 2021-03-10
Payer: COMMERCIAL

## 2021-03-10 ENCOUNTER — OUTPATIENT (OUTPATIENT)
Dept: OUTPATIENT SERVICES | Facility: HOSPITAL | Age: 71
LOS: 1 days | End: 2021-03-10
Payer: COMMERCIAL

## 2021-03-10 DIAGNOSIS — C50.912 MALIGNANT NEOPLASM OF UNSPECIFIED SITE OF LEFT FEMALE BREAST: ICD-10-CM

## 2021-03-10 DIAGNOSIS — Z00.8 ENCOUNTER FOR OTHER GENERAL EXAMINATION: ICD-10-CM

## 2021-03-10 DIAGNOSIS — G56.01 CARPAL TUNNEL SYNDROME, RIGHT UPPER LIMB: Chronic | ICD-10-CM

## 2021-03-10 DIAGNOSIS — Z98.890 OTHER SPECIFIED POSTPROCEDURAL STATES: Chronic | ICD-10-CM

## 2021-03-10 DIAGNOSIS — Z95.5 PRESENCE OF CORONARY ANGIOPLASTY IMPLANT AND GRAFT: Chronic | ICD-10-CM

## 2021-03-10 PROCEDURE — 77049 MRI BREAST C-+ W/CAD BI: CPT | Mod: 26

## 2021-03-10 PROCEDURE — C8908: CPT

## 2021-03-10 PROCEDURE — C8937: CPT

## 2021-03-10 PROCEDURE — A9585: CPT

## 2021-03-11 NOTE — ED PROVIDER NOTE - CADM POA PRESS ULCER
SUBJECTIVE:  Lacy is seen today for a hearing evaluation upon referral from Nishi Figueroa MD due to speech delay.    Parental concerns regarding hearing:  No concerns   Parental concerns regarding speech: Lacy's mother reported that he does not have many words. She stated that he did just get evaluated for speech at school.   Complications with pregnancy or birth: None   Passed  hearing screening:  yes   History of otitis media: yes   History of ear tubes: yes tubes were placed by Dr. Mclain at 8 months of age and are still visible.   Diagnosed health issues/syndromes: None   Family history of childhood hearing loss: no         OBJECTIVE:  AUDIOMETRIC RESULTS  Otoscopic Evaluation:  Examination reveals minimal cerumen bilaterally.    Audiogram:  Visual reinforcement audiometry was completed and showed responses to narrow band noise to be within normal limits from 500-4000 Hz in at least the better hearing ear.      Speech Audiometry:  Speech awareness threshold was within normal limits in at least the better hearing ear.      Acoustic Immittance:  Right Ear: Large ear canal volume consistent with a patent pressure equalization (PE) tube  Left Ear: Normal ear canal volume suggests questionable pressure equalization (PE) tube patency    IMPRESSION:  1. Speech developmental delay    Visual reinforcement audiometry was completed and showed responses to narrow band noise to be within normal limits from 500-4000 Hz in at least the better hearing ear.  Speech awareness threshold was within normal limits in at least the better hearing ear. Impedance testing was difficult to obtain due to the patient crying and moving. Impedance testing revealed a tympanogram in the right ear with a higher than normal ear canal volume, suggesting a patent tube, and a normal ear canal volume with no pressure peak in th left ear, suggesting a plugged tube.    RECOMMENDATIONS:  These results were reviewed with the patient's  parent(s) and will be forwarded to Nishi Figueroa MD. Follow up with an ENT was recommended to further evaluate the tubes. OAE's were not obtained today due to the possible plugged tube in the left ear. Norton should return to obtain more ear specific information following medical treatment or sooner if changes arise. All parental questions/concerns were answered regarding this screening this date.  The parent was advised to contact the child's physician with any future questions.   No

## 2021-03-16 ENCOUNTER — APPOINTMENT (OUTPATIENT)
Dept: ULTRASOUND IMAGING | Facility: IMAGING CENTER | Age: 71
End: 2021-03-16
Payer: COMMERCIAL

## 2021-03-16 ENCOUNTER — OUTPATIENT (OUTPATIENT)
Dept: OUTPATIENT SERVICES | Facility: HOSPITAL | Age: 71
LOS: 1 days | End: 2021-03-16
Payer: COMMERCIAL

## 2021-03-16 ENCOUNTER — RESULT REVIEW (OUTPATIENT)
Age: 71
End: 2021-03-16

## 2021-03-16 ENCOUNTER — APPOINTMENT (OUTPATIENT)
Dept: RADIOLOGY | Facility: IMAGING CENTER | Age: 71
End: 2021-03-16
Payer: COMMERCIAL

## 2021-03-16 DIAGNOSIS — Z98.890 OTHER SPECIFIED POSTPROCEDURAL STATES: Chronic | ICD-10-CM

## 2021-03-16 DIAGNOSIS — Z00.8 ENCOUNTER FOR OTHER GENERAL EXAMINATION: ICD-10-CM

## 2021-03-16 DIAGNOSIS — G56.01 CARPAL TUNNEL SYNDROME, RIGHT UPPER LIMB: Chronic | ICD-10-CM

## 2021-03-16 DIAGNOSIS — Z95.5 PRESENCE OF CORONARY ANGIOPLASTY IMPLANT AND GRAFT: Chronic | ICD-10-CM

## 2021-03-16 PROCEDURE — 76830 TRANSVAGINAL US NON-OB: CPT | Mod: 26

## 2021-03-16 PROCEDURE — 76856 US EXAM PELVIC COMPLETE: CPT

## 2021-03-16 PROCEDURE — 76856 US EXAM PELVIC COMPLETE: CPT | Mod: 26

## 2021-03-16 PROCEDURE — 76700 US EXAM ABDOM COMPLETE: CPT | Mod: 26

## 2021-03-16 PROCEDURE — 71046 X-RAY EXAM CHEST 2 VIEWS: CPT | Mod: 26

## 2021-03-16 PROCEDURE — 76700 US EXAM ABDOM COMPLETE: CPT

## 2021-03-16 PROCEDURE — 76830 TRANSVAGINAL US NON-OB: CPT

## 2021-03-16 PROCEDURE — 71046 X-RAY EXAM CHEST 2 VIEWS: CPT

## 2021-03-18 ENCOUNTER — RESULT REVIEW (OUTPATIENT)
Age: 71
End: 2021-03-18

## 2021-03-18 ENCOUNTER — OUTPATIENT (OUTPATIENT)
Dept: OUTPATIENT SERVICES | Facility: HOSPITAL | Age: 71
LOS: 1 days | End: 2021-03-18
Payer: COMMERCIAL

## 2021-03-18 ENCOUNTER — APPOINTMENT (OUTPATIENT)
Dept: ULTRASOUND IMAGING | Facility: IMAGING CENTER | Age: 71
End: 2021-03-18
Payer: COMMERCIAL

## 2021-03-18 DIAGNOSIS — Z00.8 ENCOUNTER FOR OTHER GENERAL EXAMINATION: ICD-10-CM

## 2021-03-18 DIAGNOSIS — Z95.5 PRESENCE OF CORONARY ANGIOPLASTY IMPLANT AND GRAFT: Chronic | ICD-10-CM

## 2021-03-18 DIAGNOSIS — G56.01 CARPAL TUNNEL SYNDROME, RIGHT UPPER LIMB: Chronic | ICD-10-CM

## 2021-03-18 DIAGNOSIS — C50.912 MALIGNANT NEOPLASM OF UNSPECIFIED SITE OF LEFT FEMALE BREAST: ICD-10-CM

## 2021-03-18 DIAGNOSIS — Z98.890 OTHER SPECIFIED POSTPROCEDURAL STATES: Chronic | ICD-10-CM

## 2021-03-18 PROCEDURE — 19084 BX BREAST ADD LESION US IMAG: CPT

## 2021-03-18 PROCEDURE — 19084 BX BREAST ADD LESION US IMAG: CPT | Mod: LT

## 2021-03-18 PROCEDURE — 88342 IMHCHEM/IMCYTCHM 1ST ANTB: CPT | Mod: 26

## 2021-03-18 PROCEDURE — 77065 DX MAMMO INCL CAD UNI: CPT | Mod: 26,LT

## 2021-03-18 PROCEDURE — 77065 DX MAMMO INCL CAD UNI: CPT

## 2021-03-18 PROCEDURE — 76641 ULTRASOUND BREAST COMPLETE: CPT | Mod: 26,50

## 2021-03-18 PROCEDURE — 19083 BX BREAST 1ST LESION US IMAG: CPT

## 2021-03-18 PROCEDURE — 19083 BX BREAST 1ST LESION US IMAG: CPT | Mod: LT

## 2021-03-18 PROCEDURE — 88342 IMHCHEM/IMCYTCHM 1ST ANTB: CPT

## 2021-03-18 PROCEDURE — 88305 TISSUE EXAM BY PATHOLOGIST: CPT | Mod: 26

## 2021-03-18 PROCEDURE — A4648: CPT

## 2021-03-18 PROCEDURE — 76641 ULTRASOUND BREAST COMPLETE: CPT

## 2021-03-18 PROCEDURE — 88305 TISSUE EXAM BY PATHOLOGIST: CPT

## 2021-03-19 ENCOUNTER — APPOINTMENT (OUTPATIENT)
Dept: NUCLEAR MEDICINE | Facility: IMAGING CENTER | Age: 71
End: 2021-03-19
Payer: COMMERCIAL

## 2021-03-19 ENCOUNTER — OUTPATIENT (OUTPATIENT)
Dept: OUTPATIENT SERVICES | Facility: HOSPITAL | Age: 71
LOS: 1 days | End: 2021-03-19
Payer: COMMERCIAL

## 2021-03-19 ENCOUNTER — RESULT REVIEW (OUTPATIENT)
Age: 71
End: 2021-03-19

## 2021-03-19 DIAGNOSIS — C50.912 MALIGNANT NEOPLASM OF UNSPECIFIED SITE OF LEFT FEMALE BREAST: ICD-10-CM

## 2021-03-19 DIAGNOSIS — Z98.890 OTHER SPECIFIED POSTPROCEDURAL STATES: Chronic | ICD-10-CM

## 2021-03-19 DIAGNOSIS — Z95.5 PRESENCE OF CORONARY ANGIOPLASTY IMPLANT AND GRAFT: Chronic | ICD-10-CM

## 2021-03-19 DIAGNOSIS — Z00.8 ENCOUNTER FOR OTHER GENERAL EXAMINATION: ICD-10-CM

## 2021-03-19 DIAGNOSIS — G56.01 CARPAL TUNNEL SYNDROME, RIGHT UPPER LIMB: Chronic | ICD-10-CM

## 2021-03-19 PROCEDURE — 78306 BONE IMAGING WHOLE BODY: CPT | Mod: 26

## 2021-03-19 PROCEDURE — A9561: CPT

## 2021-03-19 PROCEDURE — 78830 RP LOCLZJ TUM SPECT W/CT 1: CPT | Mod: 26

## 2021-03-19 PROCEDURE — 78830 RP LOCLZJ TUM SPECT W/CT 1: CPT

## 2021-03-19 PROCEDURE — 78306 BONE IMAGING WHOLE BODY: CPT

## 2021-03-31 ENCOUNTER — OUTPATIENT (OUTPATIENT)
Dept: OUTPATIENT SERVICES | Facility: HOSPITAL | Age: 71
LOS: 1 days | End: 2021-03-31

## 2021-03-31 ENCOUNTER — RESULT REVIEW (OUTPATIENT)
Age: 71
End: 2021-03-31

## 2021-03-31 DIAGNOSIS — C50.912 MALIGNANT NEOPLASM OF UNSPECIFIED SITE OF LEFT FEMALE BREAST: ICD-10-CM

## 2021-03-31 DIAGNOSIS — Z95.5 PRESENCE OF CORONARY ANGIOPLASTY IMPLANT AND GRAFT: Chronic | ICD-10-CM

## 2021-03-31 DIAGNOSIS — Z98.890 OTHER SPECIFIED POSTPROCEDURAL STATES: Chronic | ICD-10-CM

## 2021-03-31 DIAGNOSIS — G56.01 CARPAL TUNNEL SYNDROME, RIGHT UPPER LIMB: Chronic | ICD-10-CM

## 2021-04-01 ENCOUNTER — OUTPATIENT (OUTPATIENT)
Dept: OUTPATIENT SERVICES | Facility: HOSPITAL | Age: 71
LOS: 1 days | End: 2021-04-01
Payer: COMMERCIAL

## 2021-04-01 VITALS
HEART RATE: 57 BPM | HEIGHT: 67 IN | DIASTOLIC BLOOD PRESSURE: 65 MMHG | OXYGEN SATURATION: 98 % | TEMPERATURE: 98 F | WEIGHT: 187.39 LBS | RESPIRATION RATE: 16 BRPM | SYSTOLIC BLOOD PRESSURE: 119 MMHG

## 2021-04-01 DIAGNOSIS — C50.912 MALIGNANT NEOPLASM OF UNSPECIFIED SITE OF LEFT FEMALE BREAST: ICD-10-CM

## 2021-04-01 DIAGNOSIS — Z95.5 PRESENCE OF CORONARY ANGIOPLASTY IMPLANT AND GRAFT: Chronic | ICD-10-CM

## 2021-04-01 DIAGNOSIS — Z01.818 ENCOUNTER FOR OTHER PREPROCEDURAL EXAMINATION: ICD-10-CM

## 2021-04-01 DIAGNOSIS — G56.01 CARPAL TUNNEL SYNDROME, RIGHT UPPER LIMB: Chronic | ICD-10-CM

## 2021-04-01 DIAGNOSIS — Z98.890 OTHER SPECIFIED POSTPROCEDURAL STATES: Chronic | ICD-10-CM

## 2021-04-01 LAB
ANION GAP SERPL CALC-SCNC: 10 MMOL/L — SIGNIFICANT CHANGE UP (ref 5–17)
BUN SERPL-MCNC: 12 MG/DL — SIGNIFICANT CHANGE UP (ref 7–23)
CALCIUM SERPL-MCNC: 9 MG/DL — SIGNIFICANT CHANGE UP (ref 8.4–10.5)
CHLORIDE SERPL-SCNC: 99 MMOL/L — SIGNIFICANT CHANGE UP (ref 96–108)
CO2 SERPL-SCNC: 29 MMOL/L — SIGNIFICANT CHANGE UP (ref 22–31)
CREAT SERPL-MCNC: 0.54 MG/DL — SIGNIFICANT CHANGE UP (ref 0.5–1.3)
GLUCOSE SERPL-MCNC: 80 MG/DL — SIGNIFICANT CHANGE UP (ref 70–99)
HCT VFR BLD CALC: 41.9 % — SIGNIFICANT CHANGE UP (ref 34.5–45)
HGB BLD-MCNC: 14 G/DL — SIGNIFICANT CHANGE UP (ref 11.5–15.5)
MCHC RBC-ENTMCNC: 33.4 GM/DL — SIGNIFICANT CHANGE UP (ref 32–36)
MCHC RBC-ENTMCNC: 33.9 PG — SIGNIFICANT CHANGE UP (ref 27–34)
MCV RBC AUTO: 101.5 FL — HIGH (ref 80–100)
NRBC # BLD: 0 /100 WBCS — SIGNIFICANT CHANGE UP (ref 0–0)
PLATELET # BLD AUTO: 277 K/UL — SIGNIFICANT CHANGE UP (ref 150–400)
POTASSIUM SERPL-MCNC: 3.9 MMOL/L — SIGNIFICANT CHANGE UP (ref 3.5–5.3)
POTASSIUM SERPL-SCNC: 3.9 MMOL/L — SIGNIFICANT CHANGE UP (ref 3.5–5.3)
RBC # BLD: 4.13 M/UL — SIGNIFICANT CHANGE UP (ref 3.8–5.2)
RBC # FLD: 12.4 % — SIGNIFICANT CHANGE UP (ref 10.3–14.5)
SODIUM SERPL-SCNC: 138 MMOL/L — SIGNIFICANT CHANGE UP (ref 135–145)
WBC # BLD: 7.84 K/UL — SIGNIFICANT CHANGE UP (ref 3.8–10.5)
WBC # FLD AUTO: 7.84 K/UL — SIGNIFICANT CHANGE UP (ref 3.8–10.5)

## 2021-04-01 PROCEDURE — 36415 COLL VENOUS BLD VENIPUNCTURE: CPT

## 2021-04-01 PROCEDURE — G0463: CPT

## 2021-04-01 PROCEDURE — 85027 COMPLETE CBC AUTOMATED: CPT

## 2021-04-01 PROCEDURE — 80048 BASIC METABOLIC PNL TOTAL CA: CPT

## 2021-04-01 NOTE — H&P PST ADULT - NSICDXPASTSURGICALHX_GEN_ALL_CORE_FT
You are seen in the emergency department for your sore throat.  On our assessment there does not appear to be anything dangerous going on today, your oxygen levels are good, and you are able to breathe well, no have signs of a severe infection in the neck or throat.  You should follow up with the ENT doctor listed above for further evaluation, they may be able to use a video device to look down your throat.  Return to the emergency department if you have fevers, worsening sore throat, difficulty swallowing or breathing, or for any other reason.  
PAST SURGICAL HISTORY:  Carpal tunnel syndrome, bilateral b/l wrist surgery for carpal tunnel syndrome     delivery delivered x2    S/P foot surgery, right Bunionectomy and hammer toe with multiple revisions x 5    Stented coronary artery x 6 most recent 18

## 2021-04-01 NOTE — H&P PST ADULT - NSICDXPROBLEM_GEN_ALL_CORE_FT
PROBLEM DIAGNOSES  Problem: Malignant neoplasm of left female breast  Assessment and Plan: Patient schedule for excision of left breast cancer. Labs, Covid and cardiac clearance pending.

## 2021-04-01 NOTE — H&P PST ADULT - ATTENDING COMMENTS
70-year-old lady with left breast cancer (1:00) scheduled for excision, with preoperative localization, and central node biopsy.    Reviewed with her prior to surgery, and again the morning of operation.    All questions answered, consent on chart

## 2021-04-01 NOTE — H&P PST ADULT - NSICDXPASTMEDICALHX_GEN_ALL_CORE_FT
PAST MEDICAL HISTORY:  Anxiety     Bradycardia     CAD (coronary artery disease) 6 stents, most recent 7/24/18    Depression     GERD (gastroesophageal reflux disease)     Hyperlipidemia     Hypertension     Macular hole s/p vitrectomy    Malignant neoplasm of left breast     Other chronic osteomyelitis of foot [id number]    Smoker Current smoker 40 pack years

## 2021-04-01 NOTE — H&P PST ADULT - NSICDXFAMILYHX_GEN_ALL_CORE_FT
FAMILY HISTORY:  Family history of Alzheimer's disease, Father    Sibling  Still living? Yes, Estimated age: Age Unknown  Family history of heart disease, Age at diagnosis: Age Unknown  Family history of prostate cancer, Age at diagnosis: Age Unknown  FH: breast cancer, Age at diagnosis: Age Unknown  FH: prostate cancer, Age at diagnosis: Age Unknown    Grandparent  Still living? No  Family history of breast cancer, Age at diagnosis: Age Unknown  Family history of myocardial infarction, Age at diagnosis: Age Unknown

## 2021-04-01 NOTE — H&P PST ADULT - I HAVE PERSONALLY SEEN AND EXAMINED THE PATIENT. THERE HAVE NOT BEEN ANY CHANGES IN THE PATIENT'S HISTORY OR EXAM UNLESS COMMENTED BELOW
62F with catastrophic pontine hemorrhage, critically ill on vent going for tracheostomy tomorrow. Statement Selected

## 2021-04-01 NOTE — H&P PST ADULT - ADMIT DATE
Subjective:      Patient ID: Raul Herman is a 2 y.o. male. HPI   Bumps noted on the back of the left knee which was noted a few days ago. He has been scratching at them. No specific treatment has been used so far. Mom is unaware of any specific triggers or exposures. He has had no drainage or spreading redness. Review of Systems   Constitutional: Negative for activity change. Skin: Positive for rash. Objective:Blood pressure (!) 88/60, pulse 92, temperature 97 °F (36.1 °C), temperature source Tympanic, resp. rate 26, height 40.75\" (103.5 cm), weight 39 lb 3.2 oz (17.8 kg). Physical Exam  Vitals signs and nursing note reviewed. Constitutional:       General: He is active. Appearance: Normal appearance. HENT:      Head: Normocephalic. Cardiovascular:      Pulses: Normal pulses. Musculoskeletal: Normal range of motion. Skin:     General: Skin is warm and dry. Capillary Refill: Capillary refill takes less than 2 seconds. Comments: Scattered papules present in the back of the knees they are fleshy in appearance and have central umbilications, consistent with molluscum contagiosum   Neurological:      Mental Status: He is alert. Assessment:       Diagnosis Orders   1. Molluscum contagiosum            Plan:      Cryotherapy per procedure described below. Discussed expected course. Follow-up if no improvement in about 2 weeks. Procedure:  Risks, benefits and alternatives of treatment discussed. he was placed in prone position  No anesthesia required. Liquid nitrogen applied to verrucae lesion until area of freezing extends about 1-2 mm beyond the area of skin thickening. Procedure tolerated well           Miguel Angel Grimaldo MD     This note was completed using voice recognition software. Attempts to assure accurate transcription.   It is possible for inaccuries and mis-transcriptions to occur
01-Apr-2021

## 2021-04-01 NOTE — H&P PST ADULT - HISTORY OF PRESENT ILLNESS
69 y/o female presents for PST. Per patient routine mammo and sonogram about 02/2021 and abnormality was noted to left breast, biopsy done and resulted malignant neoplasm. Patient denies any pain or discomfort to left breast. Patient denies hx of covid or any recent travelling.

## 2021-04-06 ENCOUNTER — APPOINTMENT (OUTPATIENT)
Dept: DISASTER EMERGENCY | Facility: CLINIC | Age: 71
End: 2021-04-06

## 2021-04-06 DIAGNOSIS — Z01.818 ENCOUNTER FOR OTHER PREPROCEDURAL EXAMINATION: ICD-10-CM

## 2021-04-08 ENCOUNTER — TRANSCRIPTION ENCOUNTER (OUTPATIENT)
Age: 71
End: 2021-04-08

## 2021-04-08 LAB — SURGICAL PATHOLOGY STUDY: SIGNIFICANT CHANGE UP

## 2021-04-08 NOTE — ASU DISCHARGE PLAN (ADULT/PEDIATRIC) - ASU DC SPECIAL INSTRUCTIONSFT
Maintain mammary support for 48 hours after getting home.    At that time, may remove garment, surgical tape, and gauze.    Do not remove Steri-Strips.    May shower after above.    After showering, do not need to reapply a dressing.    Should wear mammary support, or own bra, even at night for the next 5 days.    Dr. Grijalva should call with pathology report in approximately 2 weeks, that conversation will determine further management

## 2021-04-08 NOTE — ASU DISCHARGE PLAN (ADULT/PEDIATRIC) - NURSING INSTRUCTIONS
All discharge information reviewed with patient including pain, safety, medication  and follow up care .Pt acknowledges understanding of discharge instructions

## 2021-04-09 ENCOUNTER — OUTPATIENT (OUTPATIENT)
Dept: INPATIENT UNIT | Facility: HOSPITAL | Age: 71
LOS: 1 days | End: 2021-04-09
Payer: COMMERCIAL

## 2021-04-09 ENCOUNTER — RESULT REVIEW (OUTPATIENT)
Age: 71
End: 2021-04-09

## 2021-04-09 ENCOUNTER — APPOINTMENT (OUTPATIENT)
Dept: SURGICAL ONCOLOGY | Facility: HOSPITAL | Age: 71
End: 2021-04-09

## 2021-04-09 VITALS
HEART RATE: 53 BPM | DIASTOLIC BLOOD PRESSURE: 70 MMHG | WEIGHT: 187.39 LBS | RESPIRATION RATE: 18 BRPM | OXYGEN SATURATION: 99 % | SYSTOLIC BLOOD PRESSURE: 145 MMHG | HEIGHT: 67 IN | TEMPERATURE: 97 F

## 2021-04-09 VITALS
RESPIRATION RATE: 16 BRPM | HEART RATE: 58 BPM | SYSTOLIC BLOOD PRESSURE: 143 MMHG | TEMPERATURE: 98 F | OXYGEN SATURATION: 96 % | DIASTOLIC BLOOD PRESSURE: 75 MMHG

## 2021-04-09 DIAGNOSIS — Z98.890 OTHER SPECIFIED POSTPROCEDURAL STATES: Chronic | ICD-10-CM

## 2021-04-09 DIAGNOSIS — C50.912 MALIGNANT NEOPLASM OF UNSPECIFIED SITE OF LEFT FEMALE BREAST: ICD-10-CM

## 2021-04-09 DIAGNOSIS — S98.139A COMPLETE TRAUMATIC AMPUTATION OF ONE UNSPECIFIED LESSER TOE, INITIAL ENCOUNTER: Chronic | ICD-10-CM

## 2021-04-09 DIAGNOSIS — Z95.5 PRESENCE OF CORONARY ANGIOPLASTY IMPLANT AND GRAFT: Chronic | ICD-10-CM

## 2021-04-09 DIAGNOSIS — G56.01 CARPAL TUNNEL SYNDROME, RIGHT UPPER LIMB: Chronic | ICD-10-CM

## 2021-04-09 PROCEDURE — 19125 EXCISION BREAST LESION: CPT | Mod: LT

## 2021-04-09 PROCEDURE — 38792 RA TRACER ID OF SENTINL NODE: CPT

## 2021-04-09 PROCEDURE — 88307 TISSUE EXAM BY PATHOLOGIST: CPT | Mod: 26

## 2021-04-09 PROCEDURE — 38525 BIOPSY/REMOVAL LYMPH NODES: CPT | Mod: LT

## 2021-04-09 PROCEDURE — A9541: CPT

## 2021-04-09 PROCEDURE — 76098 X-RAY EXAM SURGICAL SPECIMEN: CPT | Mod: 26

## 2021-04-09 PROCEDURE — 88307 TISSUE EXAM BY PATHOLOGIST: CPT

## 2021-04-09 PROCEDURE — 19125 EXCISION BREAST LESION: CPT

## 2021-04-09 PROCEDURE — C1889: CPT

## 2021-04-09 PROCEDURE — 76098 X-RAY EXAM SURGICAL SPECIMEN: CPT

## 2021-04-09 PROCEDURE — 38525 BIOPSY/REMOVAL LYMPH NODES: CPT

## 2021-04-09 PROCEDURE — 38900 IO MAP OF SENT LYMPH NODE: CPT | Mod: LT

## 2021-04-09 PROCEDURE — 19281 PERQ DEVICE BREAST 1ST IMAG: CPT

## 2021-04-09 PROCEDURE — 19281 PERQ DEVICE BREAST 1ST IMAG: CPT | Mod: LT

## 2021-04-09 RX ORDER — OMEPRAZOLE 10 MG/1
1 CAPSULE, DELAYED RELEASE ORAL
Qty: 0 | Refills: 0 | DISCHARGE

## 2021-04-09 RX ORDER — HYDROMORPHONE HYDROCHLORIDE 2 MG/ML
0.5 INJECTION INTRAMUSCULAR; INTRAVENOUS; SUBCUTANEOUS
Refills: 0 | Status: DISCONTINUED | OUTPATIENT
Start: 2021-04-09 | End: 2021-04-09

## 2021-04-09 RX ORDER — HEPARIN SODIUM 5000 [USP'U]/ML
5000 INJECTION INTRAVENOUS; SUBCUTANEOUS ONCE
Refills: 0 | Status: COMPLETED | OUTPATIENT
Start: 2021-04-09 | End: 2021-04-09

## 2021-04-09 RX ORDER — SODIUM CHLORIDE 9 MG/ML
1000 INJECTION, SOLUTION INTRAVENOUS
Refills: 0 | Status: DISCONTINUED | OUTPATIENT
Start: 2021-04-09 | End: 2021-04-09

## 2021-04-09 RX ORDER — IBUPROFEN 200 MG
2 TABLET ORAL
Qty: 0 | Refills: 0 | DISCHARGE

## 2021-04-09 RX ADMIN — HEPARIN SODIUM 5000 UNIT(S): 5000 INJECTION INTRAVENOUS; SUBCUTANEOUS at 12:48

## 2021-04-09 RX ADMIN — SODIUM CHLORIDE 50 MILLILITER(S): 9 INJECTION, SOLUTION INTRAVENOUS at 11:14

## 2021-04-09 NOTE — ASU PATIENT PROFILE, ADULT - PMH
Anxiety    Bradycardia    CAD (coronary artery disease)  6 stents, most recent 7/24/18  Depression    GERD (gastroesophageal reflux disease)    Hyperlipidemia    Hypertension    Macular hole  s/p vitrectomy  Malignant neoplasm of left breast    Other chronic osteomyelitis of foot  [id number]  Smoker  Current smoker 40 pack years   [Fatigue] : no fatigue [Decreased Appetite] : appetite not decreased [Recent Weight Gain (___ Lbs)] : no recent weight gain [Recent Weight Loss (___ Lbs)] : no recent weight loss [Visual Field Defect] : no visual field defect [Dry Eyes] : no dryness [Eye Pain] : no pain [Dysphagia] : no dysphagia [Neck Pain] : no neck pain [Dysphonia] : no dysphonia [Chest Pain] : no chest pain [Palpitations] : no palpitations [Lower Ext Edema] : no lower extremity edema [Shortness Of Breath] : no shortness of breath [Cough] : no cough [Orthopnea] : no orthopnea [Nausea] : no nausea [Constipation] : no constipation [Abdominal Pain] : no abdominal pain [Vomiting] : no vomiting [Diarrhea] : no diarrhea [Polyuria] : no polyuria [Dysuria] : no dysuria [Myalgia] : no myalgia  [Acanthosis] : no acanthosis  [Acne] : no acne [Dry Skin] : no dry skin [Headaches] : no headaches [Dizziness] : no dizziness [Tremors] : no tremors [Depression] : no depression [Insomnia] : no insomnia [Anxiety] : no anxiety [Polydipsia] : no polydipsia [Cold Intolerance] : no cold intolerance [Heat Intolerance] : no heat intolerance [Easy Bleeding] : no ~M tendency for easy bleeding [Easy Bruising] : no tendency for easy bruising [Swelling] : no swelling

## 2021-04-09 NOTE — ASU PATIENT PROFILE, ADULT - PSH
Carpal tunnel syndrome, bilateral  b/l wrist surgery for carpal tunnel syndrome   delivery delivered  x2  S/P foot surgery, right  Bunionectomy and hammer toe with multiple revisions x 5  Stented coronary artery  x 6 most recent 18   Amputation of toe  right middle toe  Carpal tunnel syndrome, bilateral  b/l wrist surgery for carpal tunnel syndrome   delivery delivered  x2  S/P foot surgery, right  Bunionectomy and hammer toe with multiple revisions x 5  Stented coronary artery  x 6 most recent 18

## 2021-04-10 ENCOUNTER — NON-APPOINTMENT (OUTPATIENT)
Age: 71
End: 2021-04-10

## 2021-04-12 NOTE — PATIENT PROFILE ADULT - NSTOBACCOCESSATIONEDU5_GEN_A_NUR
Withdrawal symptoms include negative mood, urges to smoke, and difficulty concentrating Melolabial Transposition Flap Text: The defect edges were debeveled with a #15 scalpel blade.  Given the location of the defect and the proximity to free margins a melolabial flap was deemed most appropriate.  Using a sterile surgical marker, an appropriate melolabial transposition flap was drawn incorporating the defect.    The area thus outlined was incised deep to adipose tissue with a #15 scalpel blade.  The skin margins were undermined to an appropriate distance in all directions utilizing iris scissors.

## 2021-04-18 LAB — SURGICAL PATHOLOGY STUDY: SIGNIFICANT CHANGE UP

## 2021-04-22 ENCOUNTER — NON-APPOINTMENT (OUTPATIENT)
Age: 71
End: 2021-04-22

## 2021-04-22 ENCOUNTER — APPOINTMENT (OUTPATIENT)
Dept: SURGICAL ONCOLOGY | Facility: CLINIC | Age: 71
End: 2021-04-22
Payer: COMMERCIAL

## 2021-04-22 VITALS
BODY MASS INDEX: 27.47 KG/M2 | SYSTOLIC BLOOD PRESSURE: 140 MMHG | TEMPERATURE: 98.1 F | DIASTOLIC BLOOD PRESSURE: 75 MMHG | HEART RATE: 60 BPM | WEIGHT: 175 LBS | OXYGEN SATURATION: 97 % | RESPIRATION RATE: 16 BRPM | HEIGHT: 67 IN

## 2021-04-22 PROCEDURE — 99024 POSTOP FOLLOW-UP VISIT: CPT

## 2021-04-22 NOTE — ASSESSMENT
[FreeTextEntry1] : We discussed her recently diagnosed left breast cancer.\par \par In addition, there were imaging findings which require further evaluation.\par 1. TWO additional ipsilateral (left) breast nodules which will be assessed on MRI.\par 2.  Ipsilateral axillary node, for which needle biopsy is being requested.\par \par In addition to her breast MRI, I have recommended:\par Screening bilateral breast ultrasounds.\par Chest x-ray.\par Sonogram of the abdomen.\par Sonogram of the pelvis.\par Bone scan.\par \par They understand and agree, prescriptions entered\par \par Offered genetic testing for further assessment.\par They understand and would like to proceed with this avenue of investigation, submitted today\par \par Further management will be based upon results from the above evaluations.\par \par Reviewed in detail, all questions answered.\par \par Referring physician updated through our internal communication system\par \par \par 03-10-21.\par The patient and I spoke.\par Earlier today she had her bilateral breast MRI at 450.\par 1.  No abnormalities in the opposite (right) breast.\par 2.  In the area around the biopsy clip in the left breast, there is a 2.7 x 2.5 x 2.7 cm asymmetric nonmass parenchymal enhancement, with a few enhancing foci, thought to likely represent the masses identified on her prior mammography and targeted ultrasound.\par 3.  Mildly prominent left axillary node, corresponding to the sonographically identified adenopathy.\par Although probably related to her recent Covid vaccine, since it is ipsilateral to her malignancy, a needle biopsy should be performed.\par That prescription was entered today.\par \par Further management based on the above results, and the results of her additional pending imaging.\par \par \par 3-18-21.\par I spoke with Dr. Guzmán, breast imaging.\par Patient is scheduled for her left axillary needle biopsy.\par She recommended additional sonogram guided core needle biopsies at the 1 and 2 o'clock position of the left breast to determine the extent of disease.\par I concur.\par Prescriptions entered.\par \par \par 3-24-21.\par She and I spoke.\par Regarding her preoperative imaging:\par 1.  Chest x-ray: Clear lungs\par 2.  Abdominal sonogram, incidental hepatic cyst.\par 3.  Pelvic ultrasound, no uterine abnormalities ovaries not well seen.\par 4.  Bone scan: No evidence of metastatic disease, degenerative changes noted.\par \par Her left breast needle biopsy x3 on March 18, 2021, are all benign and concordant with 1 year follow-up imaging recommended.\par \par She would prefer breast conservation surgery, paperwork submitted electronically today.\par \par \par 04-22-21.\par She and I spoke.\par April 9, 2021, she had left breast conserving surgery.\par Pathology:\par No residual breast cancer.\par 0/1 SLN's.\par She feels some "puffiness" in the axillary incision, so I am arranging to see her this evening.\par Separately, I have contacted medical oncology and radiation therapy to coordinate consultations.\par Ada will submit the Oncotype DX.\par Note dictated\par

## 2021-04-22 NOTE — PHYSICAL EXAM
[Normal] : supple, no neck mass and thyroid not enlarged [Normal Neck Lymph Nodes] : normal neck lymph nodes  [Normal Supraclavicular Lymph Nodes] : normal supraclavicular lymph nodes [Normal Axillary Lymph Nodes] : normal axillary lymph nodes [Normal] : normal appearance, no rash, nodules, vesicles, ulcers, erythema [de-identified] : Groins not examined [de-identified] : Below

## 2021-04-22 NOTE — REASON FOR VISIT
[Initial Consultation] : an initial consultation for [Other: _____] : [unfilled] [FreeTextEntry2] : Recently diagnosed left breast cancer, additional left breast and axillary imaging abnormalities

## 2021-04-22 NOTE — HISTORY OF PRESENT ILLNESS
[de-identified] : 70-year-old lady referred by her internist (Dr. Elaine IBARRA) with a recently diagnosed LEFT BREAST CANCER(1:00).\par This was an asymptomatic nonpalpable lesion identified on annual imaging.\par Subsequent core needle biopsy provided the above diagnosis.\par \par 2021:\par Bilateral mammography at 450: BI-RADS 0.\par Outer left breast asymmetry, additional imaging recommended.\par \par 2021:\par Diagnostic left mammogram and sonogram: BI-RADS 4.\par Persistent asymmetry in the outer left breast, corresponding with a 4 x 4 x 5 mm irregular nodule.\par \par 2021:\par Left breast core biopsy provided the above diagnosis.\par \par ADDITIONALLY, the following nodules need further evaluation by breast MRI:\par 1.  Left breast (1:00, 6 FN).\par 2.  Left breast (1:00, 5 FN)\par AND, left axillary adenopathy which requires FNA biopsy........... \par \par \par No other personal history of breast disease.\par No previous breast biopsies.\par \par No personal history of malignancy.\par \par + FH:\par Her identical twin sister had breast cancer in 2017, with a second metachronous ipsilateral primary in 2019.\par Her genetic testing identified no deleterious mutations.\par \par Her maternal grandmother also had breast cancer.\par \par No relatives with ovarian cancer.\par \par Not Ashkenazi.\par \par Other relatives with a history of malignancy:\par A different sister had cancer of the fallopian tubes.\par Brother with prostate cancer.\par A paternal uncle has bladder cancer.\par \par \par Menarche at 14.\par  3, para 3, first at 26.\par No HRT\par \par \par PMD: Dr. Elaine IBARRA.\par \par No pacemaker or defibrillator.\par \par +XARELTO AND ASPIRIN\par \par + CAD with PTCA at [place].\par + Palpitations.\par Cardiology: Dr. Irasema BUNCH\par \par Blood pressure control with amlodipine, carvedilol, and valsartan.\par Daily atorvastatin.\par \par Ex-smoker.\par Pulmonary: Dr. Anna ROSALES.\par \par Daily Celexa.\par She has been taking this for many years, the prescriptions are renewed by her internist.\par \par \par GYN: Dr. Luis OWUSU.\par She has not seen him since the summer .\par I suggested a follow-up visit.\par \par \par Colonoscopy:~2017 was normal, she does not recall any additional details.\par Concomitant EGD was also normal

## 2021-04-25 ENCOUNTER — EMERGENCY (EMERGENCY)
Facility: HOSPITAL | Age: 71
LOS: 1 days | Discharge: HOME | End: 2021-04-25
Attending: INTERNAL MEDICINE | Admitting: INTERNAL MEDICINE
Payer: COMMERCIAL

## 2021-04-25 VITALS
OXYGEN SATURATION: 100 % | DIASTOLIC BLOOD PRESSURE: 76 MMHG | RESPIRATION RATE: 16 BRPM | HEIGHT: 67 IN | SYSTOLIC BLOOD PRESSURE: 140 MMHG | HEART RATE: 57 BPM | TEMPERATURE: 98 F

## 2021-04-25 DIAGNOSIS — S98.139A COMPLETE TRAUMATIC AMPUTATION OF ONE UNSPECIFIED LESSER TOE, INITIAL ENCOUNTER: Chronic | ICD-10-CM

## 2021-04-25 DIAGNOSIS — Z95.5 PRESENCE OF CORONARY ANGIOPLASTY IMPLANT AND GRAFT: Chronic | ICD-10-CM

## 2021-04-25 DIAGNOSIS — G56.01 CARPAL TUNNEL SYNDROME, RIGHT UPPER LIMB: Chronic | ICD-10-CM

## 2021-04-25 DIAGNOSIS — Z98.890 OTHER SPECIFIED POSTPROCEDURAL STATES: Chronic | ICD-10-CM

## 2021-04-25 PROCEDURE — 99283 EMERGENCY DEPT VISIT LOW MDM: CPT

## 2021-04-25 RX ORDER — IBUPROFEN 200 MG
400 TABLET ORAL ONCE
Refills: 0 | Status: COMPLETED | OUTPATIENT
Start: 2021-04-25 | End: 2021-04-25

## 2021-04-25 RX ORDER — ACETAMINOPHEN 500 MG
650 TABLET ORAL ONCE
Refills: 0 | Status: COMPLETED | OUTPATIENT
Start: 2021-04-25 | End: 2021-04-25

## 2021-04-25 RX ADMIN — Medication 650 MILLIGRAM(S): at 12:38

## 2021-04-25 RX ADMIN — Medication 400 MILLIGRAM(S): at 12:38

## 2021-04-25 NOTE — ED PROVIDER NOTE - PMH
Anxiety    Bradycardia    CAD (coronary artery disease)  6 stents, most recent 7/24/18  Depression    GERD (gastroesophageal reflux disease)    Hyperlipidemia    Hypertension    Macular hole  s/p vitrectomy  Malignant neoplasm of left breast    Other chronic osteomyelitis of foot  [id number]  Smoker  Current smoker 40 pack years

## 2021-04-25 NOTE — ED PROVIDER NOTE - ATTENDING CONTRIBUTION TO CARE
Awake, Alert, Conversant.  Resting comfortably.  Breath sounds clear in all lung fields.  Normal and regular heart rate without murmurs, rubs, or gallops.  Normal S1/S2.  Abdomen soft and nontender.  No lower extremity swelling or tenderness.  Oriented and conversant with fluent speech, moving all extremities with good strength.    Dr. Albarado: I agree with the above provided history and exam and addend/modify it as follows.  69 y/o F Hx HLD, HTN, CAD, recent diagnosis of left breast cancer s/p lumpectomy 2 weeks ago P/W right should pain after heavy lifting.  No trauma.  no fever or chills.  No chest pain or SOB.  Consistent with musculoskeletal pain.  Plan for analgesia, re-evaluation, anticipate discharge to outpatient care once improved.    I Rafa Albarado MD performed a history and physical exam of the patient and discussed their management with the resident and /or advanced care provider. I reviewed the resident and /or ACP's note and agree with the documented findings and plan of care. My medical decision making and observations are found above.

## 2021-04-25 NOTE — ED PROVIDER NOTE - UPPER EXTREMITY EXAM, RIGHT
+muscular tenderness at region of right trapezius; no point bony tenderness; pain reproduced with ROM at right shoulder; no swelling; no redness; no warmth; no crepitus; RUE: 5/5 strength; sensation intact to light touch, < 2 sec capillary refill, 2+ pulses; no arm swelling; no palpable cords; soft compartments

## 2021-04-25 NOTE — ED PROVIDER NOTE - PROGRESS NOTE DETAILS
SYEDA ROGER: Pt medically stable for discharge.  Strict return precautions given.  Pt to follow up with PMD and ortho (referral list provided). Reassessment performed and plan for discharge discussed with Dr. Albarado who agrees with disposition and discharge plan.

## 2021-04-25 NOTE — ED PROVIDER NOTE - MUSCULOSKELETAL NECK EXAM
+right sided paraspinal muscle tenderness; no midline tenderness; no palpable deformities; FROM; no nuchal rigidity/PAIN ON MOVEMENT

## 2021-04-25 NOTE — ED PROVIDER NOTE - PATIENT PORTAL LINK FT
You can access the FollowMyHealth Patient Portal offered by [facility] by registering at the following website: [email or web address]. By joining [facility]’s FollowMyHealth portal, you will also be able to view your health information using other applications (apps) compatible with our system.

## 2021-04-25 NOTE — ED ADULT TRIAGE NOTE - CHIEF COMPLAINT QUOTE
pt works [place] [employer] [place], c/o right shoulder pain after lifting something heavy 3 weeks ago at work. pain radiates into the neck. pt notes decreased range of motion. pt had recent successful lumpectomy, about to start radiation therapy pt [place] [employer] [place] c/o right shoulder pain after lifting something heavy 3 weeks ago at work. pain radiates into the neck. pt notes decreased range of motion. had recent successful lumpectomy, about to start radiation therapy

## 2021-04-25 NOTE — ED PROVIDER NOTE - PSH
Amputation of toe  right middle toe  Carpal tunnel syndrome, bilateral  b/l wrist surgery for carpal tunnel syndrome   delivery delivered  x2  S/P foot surgery, right  Bunionectomy and hammer toe with multiple revisions x 5  Stented coronary artery  x 6 most recent 18

## 2021-04-25 NOTE — ED PROVIDER NOTE - OBJECTIVE STATEMENT
Pt is a 69 y/o F PMHx HLD, HTN, CAD, recent diagnosis of left breast cancer s/p lumpectomy 2 weeks ago (has appointment Friday to potentially start radiation/chemo) p/w right shoulder pain x 3 weeks.  Pt reports 3 weeks ago, upon lifting a heavy box, pt developed a "pulling pain" at right shoulder.  Since then, pain has been constant and would worsen with shrugging, lifting and ROM at right shoulder.  Pt reports recently began to feel pain radiating to right posterolateral neck and down right arm.  Pt reports taking ibuprofen 400 mg once a day with mild relief.  Pt denies any fevers, chills, nausea, vomiting, chest pain, SOB, numbness, weakness, trauma, falls, swelling, headache, dizziness, lightheadedness, changes in vision/hearing, illicit drug use, ETOH abuse or any other specific complaints.

## 2021-04-25 NOTE — ED ADULT NURSE NOTE - CHIEF COMPLAINT QUOTE
pt [place] [employer] [place], c/o right shoulder pain after lifting something heavy 3 weeks ago at work. pain radiates into the neck. pt notes decreased range of motion. had recent successful lumpectomy, about to start radiation therapy

## 2021-04-25 NOTE — ED PROVIDER NOTE - NSFOLLOWUPINSTRUCTIONS_ED_ALL_ED_FT
Advance activity as tolerated.  Continue all previously prescribed medications as directed unless otherwise instructed.  AVOID HEAVY LIFTING.  Take Motrin (also sold as Advil or Ibuprofen) 400 mg (two 200 mg over the counter pills) every 8 hours as needed for moderate pain or fevers-- take with food.  Take Tylenol 650mg (Two 325 mg pills) every 4-6 hours as needed for pain or fevers.  Apply lidocaine patch to affected area; this is available over the counter, follow instructions on its packaging.  Apply cool compresses for 15 minutes to affected area, 3-4 times per day.  Follow up with your primary care physician and orthopedics (referral list provided or call [phone] to make an appointment with the orthopedics clinic) in 48-72 hours- bring copies of your results.  Return to the ER for worsening or persistent symptoms, including but not limited to worsening/persistent pain, numbness, weakness, headache, dizziness, changes in vision or hearing, chest pain, shortness of breath, passing out, and/or ANY NEW OR CONCERNING SYMPTOMS. If you have issues obtaining follow up, please call: [phone] to obtain a doctor or specialist who takes your insurance in your area.  You may call [phone] to make an appointment with the internal medicine clinic.

## 2021-04-25 NOTE — ED PROVIDER NOTE - CLINICAL SUMMARY MEDICAL DECISION MAKING FREE TEXT BOX
Pt is a 69 y/o F PMHx HLD, HTN, CAD, recent diagnosis of left breast cancer s/p lumpectomy 2 weeks ago (has appointment Friday to potentially start radiation/chemo) p/w right shoulder pain x 3 weeks. -- likely musculoskeletal pain; not clinically concerning for septic joint; given neck pain and radiation down arm, possibly cervical radiculopathy, however no neurological deficits; not clinically concerning for cellulitis or DVT -- pain control, ortho follow up

## 2021-04-25 NOTE — ED PROVIDER NOTE - MUSCULOSKELETAL [+], MLM
----- Message from Amber Linton 
Fevers are gone, but per mom she is now coughing, and her left eye is leaking.  
Left message for parent to call back.    
Spoke to mother informed her of below message.  She will call if this persists.    
Well, it definitely sounds like it has progressed into a viral illness.  I would offer supportive care and monitor it for a week or two.  If her eye becomes red, or the discharge is thick and persists for more than 3 days, let us know.  
right shoulder pain/NECK PAIN

## 2021-04-29 ENCOUNTER — OUTPATIENT (OUTPATIENT)
Dept: OUTPATIENT SERVICES | Facility: HOSPITAL | Age: 71
LOS: 1 days | Discharge: HOME | End: 2021-04-29

## 2021-04-29 DIAGNOSIS — C50.911 MALIGNANT NEOPLASM OF UNSPECIFIED SITE OF RIGHT FEMALE BREAST: ICD-10-CM

## 2021-04-29 DIAGNOSIS — G56.01 CARPAL TUNNEL SYNDROME, RIGHT UPPER LIMB: Chronic | ICD-10-CM

## 2021-04-29 DIAGNOSIS — S98.139A COMPLETE TRAUMATIC AMPUTATION OF ONE UNSPECIFIED LESSER TOE, INITIAL ENCOUNTER: Chronic | ICD-10-CM

## 2021-04-29 DIAGNOSIS — Z98.890 OTHER SPECIFIED POSTPROCEDURAL STATES: Chronic | ICD-10-CM

## 2021-04-29 DIAGNOSIS — Z95.5 PRESENCE OF CORONARY ANGIOPLASTY IMPLANT AND GRAFT: Chronic | ICD-10-CM

## 2021-04-30 ENCOUNTER — OUTPATIENT (OUTPATIENT)
Dept: OUTPATIENT SERVICES | Facility: HOSPITAL | Age: 71
LOS: 1 days | Discharge: HOME | End: 2021-04-30
Payer: COMMERCIAL

## 2021-04-30 ENCOUNTER — APPOINTMENT (OUTPATIENT)
Dept: RADIATION ONCOLOGY | Facility: CLINIC | Age: 71
End: 2021-04-30
Payer: COMMERCIAL

## 2021-04-30 ENCOUNTER — APPOINTMENT (OUTPATIENT)
Dept: HEMATOLOGY ONCOLOGY | Facility: CLINIC | Age: 71
End: 2021-04-30
Payer: COMMERCIAL

## 2021-04-30 VITALS
HEIGHT: 66.54 IN | TEMPERATURE: 98 F | BODY MASS INDEX: 29.41 KG/M2 | DIASTOLIC BLOOD PRESSURE: 75 MMHG | HEART RATE: 54 BPM | OXYGEN SATURATION: 97 % | SYSTOLIC BLOOD PRESSURE: 175 MMHG | RESPIRATION RATE: 16 BRPM | WEIGHT: 185.19 LBS

## 2021-04-30 VITALS
OXYGEN SATURATION: 97 % | BODY MASS INDEX: 29.57 KG/M2 | SYSTOLIC BLOOD PRESSURE: 171 MMHG | HEIGHT: 67 IN | RESPIRATION RATE: 15 BRPM | TEMPERATURE: 97.9 F | DIASTOLIC BLOOD PRESSURE: 77 MMHG | HEART RATE: 57 BPM | WEIGHT: 188.39 LBS

## 2021-04-30 DIAGNOSIS — Z95.5 PRESENCE OF CORONARY ANGIOPLASTY IMPLANT AND GRAFT: Chronic | ICD-10-CM

## 2021-04-30 DIAGNOSIS — I25.10 ATHEROSCLEROTIC HEART DISEASE OF NATIVE CORONARY ARTERY W/OUT ANGINA PECTORIS: ICD-10-CM

## 2021-04-30 DIAGNOSIS — Z98.890 OTHER SPECIFIED POSTPROCEDURAL STATES: Chronic | ICD-10-CM

## 2021-04-30 DIAGNOSIS — S98.139A COMPLETE TRAUMATIC AMPUTATION OF ONE UNSPECIFIED LESSER TOE, INITIAL ENCOUNTER: Chronic | ICD-10-CM

## 2021-04-30 DIAGNOSIS — G56.01 CARPAL TUNNEL SYNDROME, RIGHT UPPER LIMB: Chronic | ICD-10-CM

## 2021-04-30 PROCEDURE — 99072 ADDL SUPL MATRL&STAF TM PHE: CPT

## 2021-04-30 PROCEDURE — 99204 OFFICE O/P NEW MOD 45 MIN: CPT | Mod: 25

## 2021-04-30 PROCEDURE — 99204 OFFICE O/P NEW MOD 45 MIN: CPT

## 2021-04-30 PROCEDURE — 77263 THER RADIOLOGY TX PLNG CPLX: CPT

## 2021-04-30 RX ORDER — ERGOCALCIFEROL 1.25 MG/1
1.25 MG CAPSULE, LIQUID FILLED ORAL
Qty: 13 | Refills: 0 | Status: DISCONTINUED | COMMUNITY
Start: 2021-01-14 | End: 2021-04-30

## 2021-04-30 NOTE — HISTORY OF PRESENT ILLNESS
[FreeTextEntry1] : 71 yo female  \par \par Diagnosis: left breast invasive ductal carcinoma 0.4cm, G1 pT1a N0 (0/1), ER 95% [place] 95%, HER2 negative\par \par Oncologic history/history of presenting complaint: Screening mammo 2/4/2021 showed subcm asymmetry with suggestion of distortion left lateral breast. Diagnostic left mammo/sono 2/18/202 showed 5mm asymmetry outer left breast with associated mild distortion, likely corresponding to the non-circumscribed nonparallel hypoechoic masses on US at 1:00 5cmfn and 2:00 axis. \par Additional indeterminant left breast hypoechoic mass at 1:00 5cmfn on US. New unilateral left axillary LAD on US, recent COVID vaccine in left arm within last one month. \par \par  2/25/2021:  left breast core biopsy 1:00 6 cmfn  showed invasive well differentiated ductal carcinoma, NS 4/9, invasive tumor at least 0.4cm, no LVI. ER 95%, [place] 95%, HER2 2+, CISH neg.\par \par 3/10/2021: MRI breast b/l: showed post biopsy hematoma in upper outer left breast 1.3cm.  Surrounding hematoma, there is asymmetric non-mass parenchymal enhancement and few small enhancing foci, including dominant 4mm enhancing focus with washout kinetics with overall area measuring approx 2.7cm. No definite discrete MRI findings correlating to the additional masses seen on US at 1:00 and 2:00 location. Mild prominent left axillary LN with small visible fatty hilum 1.9cm,\par \par 3/18/2021: US guided biopsy left breast: Final pathology: 1) left breast 1:00 5 cm FN  benign breast tissue with fibroadenomatoid change. 2) left breast axilla 1:00 13cm FN reactive lymph node. 3) left breast  2:00 4 cm FN fat necrosis.\par \par 4/9/2021: LUOQ lumpectomy and SLN biopsy with Dr. Grijalva . Pathology: no residual invasive carcinoma, no DCIS, no LVI, focal LCIS classic type, focal ALH, focal ADH, focal flat epithelial atypia, complex sclerosing lesion, nodular adenosis, intraductal papillomata, One SLN negative for metastatic carcinoma( 0/1). Margins negative. pT1a pN0\par \par Menarche: 14\par \par FHx: identical twin sister breast ca 2017with a second metachronous ipsilateral primary in 2019, maternal grandmother:breast ca sister: cancer of the fallopian tubes, brother:prostate cancer, paternal uncle: bladder cancer. Her sister's genetic testing negative for deleterious mutations.\par \par Today: Left axilla and breast discomfort 2/10 relieved with ibuprofen. Appt. to see Dr. Caballero today.\par

## 2021-04-30 NOTE — REVIEW OF SYSTEMS
[SOB on Exertion] : shortness of breath during exertion [Joint Pain] : joint pain [Anxiety] : anxiety [Negative] : Gastrointestinal [Visual Disturbances] : no visual disturbances [Loss of Hearing] : no loss of hearing [Chest Pain] : no chest pain [Palpitations] : no palpitations [Cough] : no cough [Urinary Frequency] : no change in urinary frequency [Confused] : no confusion [Hot Flashes] : no hot flashes [Swollen Glands] : no swollen glands [FreeTextEntry9] : chronic back and legs [de-identified] : left breast/axilla surgical incisions healed

## 2021-04-30 NOTE — PHYSICAL EXAM
[General Appearance - Alert] : alert [Sclera] : the sclera and conjunctiva were normal [Hearing Threshold Finger Rub Not Kings] : hearing was normal [] : no respiratory distress [Motor Tone] : muscle strength and tone were normal [Skin Color & Pigmentation] : normal skin color and pigmentation [No Focal Deficits] : no focal deficits [Oriented To Time, Place, And Person] : oriented to person, place, and time [Respiration, Rhythm And Depth] : normal respiratory rhythm and effort [Edema] : no peripheral edema present [Breast Palpation Mass] : no palpable masses [No UE Edema] : there is no upper extremity edema [Normal] : oriented to person, place and time, the affect was normal, the mood was normal and not anxious [de-identified] : left breast/axilla surgical incisions healed

## 2021-04-30 NOTE — VITALS
[Maximal Pain Intensity: 2/10] : 2/10 [Pain Location: ___] : Pain Location: [unfilled] [OTC] : OTC [90: Able to carry normal activity; minor signs or symptoms of disease.] : 90: Able to carry normal activity; minor signs or symptoms of disease.  [3 - Distress Level] : Distress Level: 3 [Least Pain Intensity: 0/10] : 0/10 [ECOG Performance Status: 0 - Fully active, able to carry on all pre-disease performance without restriction] : Performance Status: 0 - Fully active, able to carry on all pre-disease performance without restriction

## 2021-05-03 NOTE — PHYSICAL EXAM
[Normal] : supple, no neck mass and thyroid not enlarged [Normal Neck Lymph Nodes] : normal neck lymph nodes  [Normal Supraclavicular Lymph Nodes] : normal supraclavicular lymph nodes [Normal Axillary Lymph Nodes] : normal axillary lymph nodes [Normal] : normal appearance, no rash, nodules, vesicles, ulcers, erythema [de-identified] : Groins not examined [de-identified] : Below

## 2021-05-03 NOTE — ASSESSMENT
[FreeTextEntry1] : Recuperating well from her left breast conserving operation.\par \par Postoperative seroma associated with the lumpectomy.\par \par Medical oncology appointment is April 30, 2021.\par Radiation oncology consultation shortly thereafter.\par \par I have asked to see her after those 2 visits, sooner if needed.\par \par

## 2021-05-03 NOTE — HISTORY OF PRESENT ILLNESS
[de-identified] : MARTITA BARRY  is a 70 year old female here for initial consultation for management of LEFT breast cancer.\par \par Pt went for a screening mammogram sent by PCP on 2/4/2021 which showed subcm asymmetry with suggestion of distortion left lateral breast, BIRADS 0.\par \par On 2/18/2021, pt underwent diagnostic left mammogram with left breast US which showed 5mm asymmetry outer left breast with associated mild distortion, likely corresponding to the non-circumscribed nonparallel hypoechoic masses on US at 1:00 5cmfn and 2:00 axis.  Additional indeterminant left breast hypoechoic mass at 1:00 5cmfn on US.  Rec US guided biopsy.  New unilateral left axillary LAD on US, recent COVID vaccine in left arm within last one month.  Short term follow up vs biopsy if path malignant, BIRADS 4B.\par \par On 2/25/2021, pt had a left breast 1:00 6cmfn core biopsy which showed invasive well differentiated ductal carcinoma, edi score 4/9 (1+2+1), invasive tumor at least 0.4cm, no LVI.  ER 95%, [place] 95%, HER2 2+, CISH neg.\par \par Pt had an MRI breast b/l on 3/10/2021 showed post biopsy hematoma in upper outer left breast 1.3cm, surrounding hematoma, there is asymmetric non-mass parenchymal enhancement and few small enhancing foci, including dominant 4mm enhancing focus with washout kinetics with overall area measuring approx 2.7cm.  No definite discrete MRI findings correlating to the additional masses seen on US at 1:00 and 2:00 location.  US biopsy recommended.  No additional suspicious enhancing masses seen in left breast, mild prominent left axillary LN with small visible fatty hilum 1.9cm, no other significant axillary or internal mammary LAD, BIRADS 4A.\par \par Patient underwent a complete bilateral US breast on 3/18/2021.  Findings significant for:\par 1) 1:00 5cmfn 10mm irregular hypoechoic mass - biopsy showed benign breast tissue with fibroadenomatoid change\par 2) 2:00 4cmfn 5mm irregular hypoechoic mass - biopsy consistent with fat necrosis.\par 3) 2:00 6cmfn  compatible with hematoma \par 4) axilla 1:00 3cmfn prominent left axillary LN with cortical thickening up to 6mm - reactive lymph node\par Results concordant with imaging.\par \par Additional workup:\par CXR - clear lungs \par US pelvis on 3/16/2021 - no adnexal mass, normal uterus.\par US abdomen 3/16/2021 - small liver cyst, otherwise normal.\par Bone scan on 3/19/2021 - no evidence of osseous metastasis, degenerative disease in lumbar spine and major joints.\par \par Patient underwent a left upper outer quadrant lumpectomy and SLN biopsy with Dr. Alen Grijalva on 4/9/2021.  Pathology showed no residual invasive carcinoma, no DCIS, no LVI, skin neg for carcinoma, focal LCIS classic type, focal ALH, focal ADH, focal flat epithelial atypia, complex sclerosing lesion, nodular adenosis, intraductal papillomata, fibrocystic changes, columnar cell change and hyperplasia, usual ductal hyperplasia, sclerosing adenosis and benign epithelial calcifications, pseudoangiomatous stromal hyperplasia, focal vessels with medial calcific sclerosis, biopsy site changes.  One lymph node negative for metastatic carcinoma.  pT1a pN0(sn)\par \par Risk factors: no previous biopsies.  Menarche: age 14, post menopausal age 40, age of first preg 26, 3 pregnancies, 3 births, 3 sons, 2 passed away,  No breast feeding, no HRT.  No h/o OCP use.  Not Ashkenazi.  Family history includes her identical twin sister had breast cancer dx age 65, with a second metachronous ipsilateral primary in 2019, maternal grandmother also had breast cancer s/p mastectomy age dx 70's, other sister had cancer of the fallopian tubes age dx 65, also with skin cancer (unknown type), brother with prostate cancer age dx 50's, paternal uncle has bladder cancer age dx 70's.  [facility] 3/8/2021. [facility] genetic testing negative for 9 gene breast cancer panel.\par \par Pertinent PMH:\par -h/o CAD with PTCA, s/p 6 stents (in 2002, 3 stents at [place]) on prev on Plavix switched to Xarelto 2.5mg BID (~6mos ago) and aspirin 81mg daily\par -smoking 1/2 PPD/d x 30 years, ETOH 5 drinks/week\par \par HEALTH MAINTENANCE\par PCP Dr. Meyers following for HTN, HLD, CAD\par Pulm Dr. Anna Roche, current smoker\par Cardiologist Dr. Irasema Mendoza - CAD s/p PTCA\par Screening colonoscopy 2 years ago, polyp removed benign.  EGD also normal.\par Pap smear >3 years, normal. GYN Dr. Luis Nguyen.\par Bone density done 2/4/2021 - reportedly normal\par No previous skin exams [de-identified] : .

## 2021-05-03 NOTE — PHYSICAL EXAM
[Fully active, able to carry on all pre-disease performance without restriction] : Status 0 - Fully active, able to carry on all pre-disease performance without restriction [Normal] : affect appropriate [de-identified] : left lumpectomy and axillary scar healing well; right breast no masses

## 2021-05-03 NOTE — CONSULT LETTER
[Dear  ___] : Dear  [unfilled], [Consult Letter:] : I had the pleasure of evaluating your patient, [unfilled]. [Please see my note below.] : Please see my note below. [Consult Closing:] : Thank you very much for allowing me to participate in the care of this patient.  If you have any questions, please do not hesitate to contact me. [Sincerely,] : Sincerely, [DrMila  ___] : Dr. FAUSTIN [DrMila ___] : Dr. FAUSTIN [FreeTextEntry3] : Kathy Caballero

## 2021-05-03 NOTE — REVIEW OF SYSTEMS
[Negative] : Endocrine [FreeTextEntry5] : CAD, previous PTCA with stent placement. [FreeTextEntry6] : Ex-smoker [FreeTextEntry1] : Breast cancer

## 2021-05-03 NOTE — HISTORY OF PRESENT ILLNESS
[de-identified] : First postoperative visit for this 70-year-old lady who 2021, had left breast conserving surgery for left breast cancer.\par Pathology:\par No residual cancer, biopsy site changes were identified.\par 0/1 SLN's.\par \par \par Preoperative imaging:\par 1.  There were two additional left breast findings for which evaluation with MRI had been suggested.\par 2.  Normal ipsilateral axillary node.\par \par Preoperative breast MRI (3/10/2021:\par 1.  No right breast abnormalities.\par 2.  Area of enhancement around the biopsy clip 2.7 x 2.5 x 2.7 cm.\par 3.  All the abnormalities mentioned above had abnormal appearances on MRI; biopsies recommended.\par 4.  Re-demonstration of abnormal appearing left axillary node.\par \par 3-18-21:\par Left breast needle biopsy x2: Benign and concordant\par Left axillary needle biopsy also benign and concordant.\par \par The remainder of her preoperative imaging:\par Chest x-ray: Clear lungs.\par Abdominal ultrasound: Incidental liver cyst.\par Pelvic sonogram, no uterine abnormalities identified, ovaries poorly visualized.\par Bone scan: No evidence of metastatic disease.\par \par Preoperative genetic testing ([facility]): NO deleterious mutations.\par \par \par She and I spoke earlier today to discuss her surgical pathology.\par \par Oncotype DX is being submitted.\par \par Nurse navigators have been contacted to arrange for medical oncology and radiation therapy consultations.\par Presently her appointment with medical oncology is scheduled for 2021.\par Radiation oncology is the following week, but they are trying to move that up to coincide with the above visit.\par \par When I spoke to her, she was concerned about some "puffiness" in the axillary incision.\par No other specific or constitutional signs or symptoms.\par \par \par \par 2021, she was referred by her internist (Dr. Elaine IBARRA) with a recently diagnosed LEFT BREAST CANCER(1:00).\par This was an asymptomatic nonpalpable lesion identified on annual imaging.\par Subsequent core needle biopsy provided the above diagnosis.\par \par 2021:\par Bilateral mammography at 450: BI-RADS 0.\par Outer left breast asymmetry, additional imaging recommended.\par \par 2021:\par Diagnostic left mammogram and sonogram: BI-RADS 4.\par Persistent asymmetry in the outer left breast, corresponding with a 4 x 4 x 5 mm irregular nodule.\par \par 2021:\par Left breast core biopsy provided the above diagnosis.\par \par ADDITIONALLY, the following nodules need further evaluation by breast MRI:\par 1.  Left breast (1:00, 6 FN).\par 2.  Left breast (1:00, 5 FN)\par AND, left axillary adenopathy which requires FNA biopsy........... \par \par \par No other personal history of breast disease.\par No previous breast biopsies.\par \par No personal history of malignancy.\par \par + FH:\par Her identical twin sister had breast cancer in 2017, with a second metachronous ipsilateral primary in 2019.\par Her genetic testing identified no deleterious mutations.\par \par Her maternal grandmother also had breast cancer.\par \par No relatives with ovarian cancer.\par \par Not Ashkenazi.\par \par Other relatives with a history of malignancy:\par A different sister had cancer of the fallopian tubes.\par Brother with prostate cancer.\par A paternal uncle has bladder cancer.\par \par \par Menarche at 14.\par  3, para 3, first at 26.\par No HRT\par \par \par PMD: Dr. Elaine IBARRA.\par \par No pacemaker or defibrillator.\par \par +XARELTO AND ASPIRIN\par \par + CAD with PTCA at [place].\par + Palpitations.\par Cardiology: Dr. Irasema BUNCH\par \par Blood pressure control with amlodipine, carvedilol, and valsartan.\par Daily atorvastatin.\par \par Ex-smoker.\par Pulmonary: Dr. Anna ROSALES.\par \par Daily Celexa.\par She has been taking this for many years, the prescriptions are renewed by her internist.\par \par \par GYN: Dr. Luis OWUSU.\par She has not seen him since the summer 2018.\par I suggested a follow-up visit.\par \par \par Colonoscopy:~ was normal, she does not recall any additional details.\par Concomitant EGD was also normal

## 2021-05-03 NOTE — REASON FOR VISIT
[Post-Op] : a post-op for [Other: _____] : [unfilled] [FreeTextEntry2] : Stage I left breast malignancy treated with breast conservation surgery

## 2021-05-07 ENCOUNTER — NON-APPOINTMENT (OUTPATIENT)
Age: 71
End: 2021-05-07

## 2021-05-07 PROCEDURE — 77333 RADIATION TREATMENT AID(S): CPT | Mod: 26

## 2021-05-07 PROCEDURE — 77290 THER RAD SIMULAJ FIELD CPLX: CPT | Mod: 26

## 2021-05-13 ENCOUNTER — APPOINTMENT (OUTPATIENT)
Dept: SURGICAL ONCOLOGY | Facility: CLINIC | Age: 71
End: 2021-05-13

## 2021-05-13 PROCEDURE — 77295 3-D RADIOTHERAPY PLAN: CPT | Mod: 26

## 2021-05-13 PROCEDURE — 77334 RADIATION TREATMENT AID(S): CPT | Mod: 26

## 2021-05-13 PROCEDURE — 77300 RADIATION THERAPY DOSE PLAN: CPT | Mod: 26

## 2021-05-17 NOTE — REVIEW OF SYSTEMS
[Negative] : Endocrine [FreeTextEntry5] : Palpitations, hypertension, hypercholesterolemia [FreeTextEntry1] : Breast cancer [de-identified] : Daily Celexa

## 2021-05-17 NOTE — ASSESSMENT
[FreeTextEntry1] : 5/13/2021: She did NOT keep her appointment for follow-up of stage I malignancy of the left breast treated with breast conservation surgery.\par \par \par 5-17-21:\par I returned her call.\par She has some pain in her left arm (posterior) alondra. w/ movement.\par She is supposed to start radiation on May 19.\par She had not kept her schedule follow-up on May 13.\par I offered to see her on May 20, she seems agreeable.\par Vee will call her to coordinate a visit.

## 2021-05-17 NOTE — PHYSICAL EXAM
[Normal] : supple, no neck mass and thyroid not enlarged [Normal Neck Lymph Nodes] : normal neck lymph nodes  [Normal Supraclavicular Lymph Nodes] : normal supraclavicular lymph nodes [Normal Axillary Lymph Nodes] : normal axillary lymph nodes [Normal] : normal appearance, no rash, nodules, vesicles, ulcers, erythema [de-identified] : Groins not examined [de-identified] : Below

## 2021-05-17 NOTE — REASON FOR VISIT
[Other: _____] : [unfilled] [FreeTextEntry2] : 5/13/2021: She did not keep her appointment for follow-up of stage I malignancy of the left breast treated with breast conservation surgery

## 2021-05-17 NOTE — HISTORY OF PRESENT ILLNESS
[de-identified] : Second postoperative visit for this 70-year-old lady who 2021, had LEFT BREASTconserving surgery for a stage I malignancy.\par \par Pathology:\par No residual cancer, biopsy site changes were identified.\par 0/1 SLN's.\par \par \par Preoperative imaging:\par 1.  There were two additional left breast findings for which evaluation with MRI had been suggested.\par 2.  Normal ipsilateral axillary node.\par \par Preoperative breast MRI (3/10/2021:\par 1.  No right breast abnormalities.\par 2.  Area of enhancement around the biopsy clip 2.7 x 2.5 x 2.7 cm.\par 3.  All the abnormalities mentioned above had abnormal appearances on MRI; biopsies recommended.\par 4.  Re-demonstration of abnormal appearing left axillary node.\par \par 3-18-21:\par Left breast needle biopsy x2: Benign and concordant\par Left axillary needle biopsy also benign and concordant.\par \par The remainder of her preoperative imaging:\par Chest x-ray: Clear lungs.\par Abdominal ultrasound: Incidental liver cyst.\par Pelvic sonogram, no uterine abnormalities identified, ovaries poorly visualized.\par Bone scan: No evidence of metastatic disease.\par \par \par Preoperative genetic testing ([facility]): NO deleterious mutations.\par \par \par She and I spoke earlier today to discuss her surgical pathology.\par \par \par 2021:\par Consultation with radiation oncology (Dr. Milla CARRASCO).\par Adjuvant treatment recommended.\par \par 2021:\par She also met with medical oncology (Dr. Kathy WALLACE).\par Treatment with anastrozole recommended.\par \par \par 2021:\par She was concerned about some "puffiness" in the axillary incision.\par No other specific or constitutional signs or symptoms.\par \par My exam:\par No worrisome findings associated with the axillary incision.\par + Asymptomatic seroma at lumpectomy.\par \par \par She returns for scheduled follow-up today.\par \par \par 2021, she was referred by her internist (Dr. Elaine IBARRA) with a recently diagnosed LEFT BREAST CANCER(1:00).\par This was an asymptomatic nonpalpable lesion identified on annual imaging.\par Subsequent core needle biopsy provided the above diagnosis.\par \par 2021:\par Bilateral mammography at 450: BI-RADS 0.\par Outer left breast asymmetry, additional imaging recommended.\par \par 2021:\par Diagnostic left mammogram and sonogram: BI-RADS 4.\par Persistent asymmetry in the outer left breast, corresponding with a 4 x 4 x 5 mm irregular nodule.\par \par 2021:\par Left breast core biopsy provided the above diagnosis.\par \par ADDITIONALLY, the following nodules need further evaluation by breast MRI:\par 1.  Left breast (1:00, 6 FN).\par 2.  Left breast (1:00, 5 FN)\par AND, left axillary adenopathy which requires FNA biopsy........... \par \par \par No other personal history of breast disease.\par No previous breast biopsies.\par \par No personal history of malignancy.\par \par + FH:\par Her identical twin sister had breast cancer in 2017, with a second metachronous ipsilateral primary in 2019.\par Her genetic testing identified no deleterious mutations.\par \par Her maternal grandmother also had breast cancer.\par \par No relatives with ovarian cancer.\par \par Not Ashkenazi.\par \par Other relatives with a history of malignancy:\par A different sister had cancer of the fallopian tubes.\par Brother with prostate cancer.\par A paternal uncle has bladder cancer.\par \par \par Menarche at 14.\par  3, para 3, first at 26.\par No HRT\par \par \par PMD: Dr. Elaine IBARRA.\par \par No pacemaker or defibrillator.\par \par +XARELTO AND ASPIRIN\par \par + CAD with PTCA at [place].\par + Palpitations.\par Cardiology: Dr. Irasema BUNCH\par \par Blood pressure control with amlodipine, carvedilol, and valsartan.\par Daily atorvastatin.\par \par Ex-smoker.\par Pulmonary: Dr. Anna ROSALES.\par \par Daily Celexa.\par She has been taking this for many years, the prescriptions are renewed by her internist.\par \par \par GYN: Dr. Luis OWUSU.\par She has not seen him since the summer 2018.\par I suggested a follow-up visit.\par \par \par Colonoscopy:~2017 was normal, she does not recall any additional details.\par Concomitant EGD was also normal

## 2021-05-18 PROCEDURE — 77280 THER RAD SIMULAJ FIELD SMPL: CPT | Mod: 26

## 2021-05-25 ENCOUNTER — NON-APPOINTMENT (OUTPATIENT)
Age: 71
End: 2021-05-25

## 2021-05-25 PROCEDURE — 77427 RADIATION TX MANAGEMENT X5: CPT

## 2021-05-25 NOTE — DISEASE MANAGEMENT
[Pathological] : TNM Stage: p [I] : I [TTNM] : 1a [NTNM] : 0 [MTNM] : x [de-identified] : left breast

## 2021-05-25 NOTE — HISTORY OF PRESENT ILLNESS
[FreeTextEntry1] : 69 yo female  \par \par Diagnosis: left breast invasive ductal carcinoma 0.4cm, G1 pT1a N0 (0/1), ER 95% MT 95%, HER2 negative\par \par 5/25/21: 5/5 fx intermittent twinges treated site. No skin changes - will likely come next week.

## 2021-05-25 NOTE — PHYSICAL EXAM
[General Appearance - Alert] : alert [General Appearance - In No Acute Distress] : in no acute distress [de-identified] : faint erythema to treated  site

## 2021-06-07 ENCOUNTER — NON-APPOINTMENT (OUTPATIENT)
Age: 71
End: 2021-06-07

## 2021-07-06 ENCOUNTER — APPOINTMENT (OUTPATIENT)
Dept: RADIATION ONCOLOGY | Facility: CLINIC | Age: 71
End: 2021-07-06
Payer: COMMERCIAL

## 2021-07-13 ENCOUNTER — APPOINTMENT (OUTPATIENT)
Dept: RADIATION ONCOLOGY | Facility: CLINIC | Age: 71
End: 2021-07-13
Payer: COMMERCIAL

## 2021-07-13 VITALS
BODY MASS INDEX: 29.7 KG/M2 | TEMPERATURE: 96.7 F | SYSTOLIC BLOOD PRESSURE: 166 MMHG | OXYGEN SATURATION: 97 % | WEIGHT: 187.06 LBS | RESPIRATION RATE: 16 BRPM | DIASTOLIC BLOOD PRESSURE: 77 MMHG | HEART RATE: 55 BPM

## 2021-07-13 PROCEDURE — 99024 POSTOP FOLLOW-UP VISIT: CPT

## 2021-07-13 NOTE — HISTORY OF PRESENT ILLNESS
[FreeTextEntry1] : 71 yo female  \par \par Diagnosis: left breast invasive ductal carcinoma 0.4cm, G1 pT1a N0 (0/1), ER 95% NJ 95%, HER2 negative\par \par Radiation\par Treatment Dates: 5/19/2021 - 5/25/2021 \par Breast _L  3D-Conformal  2,600 cGy  5  520 cGy \par CLINICAL COURSE:  Tolerated her radiation well without significant acute side effects.  \par \par Today: Fatigue. Worsening SOB with activity. Planned for angio 7/26/21. Denies breast pain\par Taking anastrazole and will see Dr. Caballero in July. Says she has some mood swings and feels fatigued with treatment. Suggest longer trial of treatment to establish side effect profile. No appt. sched. with Dr. Grijalva. \par

## 2021-07-13 NOTE — REVIEW OF SYSTEMS
[Fatigue: Grade 1 - Fatigue relieved by rest] : Fatigue: Grade 1 - Fatigue relieved by rest [Dyspnea: Grade 1 - Shortness of breath with moderate exertion] : Dyspnea: Grade 1 - Shortness of breath with moderate exertion [Dermatitis Radiation: Grade 0] : Dermatitis Radiation: Grade 0 [Pruritus: Grade 0] : Pruritus: Grade 0 [Skin Hyperpigmentation: Grade 0] : Skin Hyperpigmentation: Grade 0 [Skin Induration: Grade 0] : Skin Induration: Grade 0

## 2021-07-21 ENCOUNTER — OUTPATIENT (OUTPATIENT)
Dept: OUTPATIENT SERVICES | Facility: HOSPITAL | Age: 71
LOS: 1 days | Discharge: HOME | End: 2021-07-21

## 2021-07-21 DIAGNOSIS — Z95.5 PRESENCE OF CORONARY ANGIOPLASTY IMPLANT AND GRAFT: Chronic | ICD-10-CM

## 2021-07-21 DIAGNOSIS — S98.139A COMPLETE TRAUMATIC AMPUTATION OF ONE UNSPECIFIED LESSER TOE, INITIAL ENCOUNTER: Chronic | ICD-10-CM

## 2021-07-21 DIAGNOSIS — G56.01 CARPAL TUNNEL SYNDROME, RIGHT UPPER LIMB: Chronic | ICD-10-CM

## 2021-07-21 DIAGNOSIS — Z98.890 OTHER SPECIFIED POSTPROCEDURAL STATES: Chronic | ICD-10-CM

## 2021-07-21 DIAGNOSIS — C50.911 MALIGNANT NEOPLASM OF UNSPECIFIED SITE OF RIGHT FEMALE BREAST: ICD-10-CM

## 2021-07-23 ENCOUNTER — APPOINTMENT (OUTPATIENT)
Dept: HEMATOLOGY ONCOLOGY | Facility: CLINIC | Age: 71
End: 2021-07-23

## 2021-10-13 NOTE — REVIEW OF SYSTEMS
[Fatigue: Grade 1 - Fatigue relieved by rest] : Fatigue: Grade 1 - Fatigue relieved by rest [Dyspnea: Grade 1 - Shortness of breath with moderate exertion] : Dyspnea: Grade 1 - Shortness of breath with moderate exertion [Pruritus: Grade 0] : Pruritus: Grade 0 [Skin Hyperpigmentation: Grade 0] : Skin Hyperpigmentation: Grade 0 [Skin Induration: Grade 0] : Skin Induration: Grade 0 [Dermatitis Radiation: Grade 0] : Dermatitis Radiation: Grade 0

## 2021-10-13 NOTE — HISTORY OF PRESENT ILLNESS
[FreeTextEntry1] : 71 yo female  \par \par Diagnosis: left breast invasive ductal carcinoma 0.4cm, G1 pT1a N0 (0/1), ER 95% ME 95%, HER2 negative\par \par Radiation\par Treatment Dates: 5/19/2021 - 5/25/2021 \par Breast _L  3D-Conformal  2,600 cGy  5  520 cGy \par CLINICAL COURSE:  Tolerated her radiation well without significant acute side effects.  \par \par 7/13/21: Fatigue. Worsening SOB with activity. Planned for angio 7/26/21. Denies breast pain\par Taking anastrazole and will see Dr. Caballero in July. Says she has some mood swings and feels fatigued with treatment. Suggest longer trial of treatment to establish side effect profile. No appt. sched. with Dr. Grijalva. \par  \par 10/13/21: She presents here for a follow up.

## 2021-10-13 NOTE — DISEASE MANAGEMENT
[Pathological] : TNM Stage: p [TTNM] : 1a [NTNM] : 0 [MTNM] : x [I] : I [de-identified] : left breast

## 2021-10-15 ENCOUNTER — APPOINTMENT (OUTPATIENT)
Dept: RADIATION ONCOLOGY | Facility: CLINIC | Age: 71
End: 2021-10-15

## 2021-10-19 ENCOUNTER — OUTPATIENT (OUTPATIENT)
Dept: OUTPATIENT SERVICES | Facility: HOSPITAL | Age: 71
LOS: 1 days | End: 2021-10-19
Payer: COMMERCIAL

## 2021-10-19 ENCOUNTER — APPOINTMENT (OUTPATIENT)
Dept: CT IMAGING | Facility: IMAGING CENTER | Age: 71
End: 2021-10-19
Payer: COMMERCIAL

## 2021-10-19 DIAGNOSIS — G56.01 CARPAL TUNNEL SYNDROME, RIGHT UPPER LIMB: Chronic | ICD-10-CM

## 2021-10-19 DIAGNOSIS — S98.139A COMPLETE TRAUMATIC AMPUTATION OF ONE UNSPECIFIED LESSER TOE, INITIAL ENCOUNTER: Chronic | ICD-10-CM

## 2021-10-19 DIAGNOSIS — Z98.890 OTHER SPECIFIED POSTPROCEDURAL STATES: Chronic | ICD-10-CM

## 2021-10-19 DIAGNOSIS — Z95.5 PRESENCE OF CORONARY ANGIOPLASTY IMPLANT AND GRAFT: Chronic | ICD-10-CM

## 2021-10-19 DIAGNOSIS — Z00.8 ENCOUNTER FOR OTHER GENERAL EXAMINATION: ICD-10-CM

## 2021-10-19 PROCEDURE — 75635 CT ANGIO ABDOMINAL ARTERIES: CPT

## 2021-10-19 PROCEDURE — 82565 ASSAY OF CREATININE: CPT

## 2021-10-19 PROCEDURE — 75635 CT ANGIO ABDOMINAL ARTERIES: CPT | Mod: 26

## 2022-01-01 NOTE — H&P PST ADULT - ACCEPTABLE

## 2022-01-19 ENCOUNTER — APPOINTMENT (OUTPATIENT)
Dept: PULMONOLOGY | Facility: CLINIC | Age: 72
End: 2022-01-19
Payer: COMMERCIAL

## 2022-01-19 ENCOUNTER — NON-APPOINTMENT (OUTPATIENT)
Age: 72
End: 2022-01-19

## 2022-01-19 VITALS — WEIGHT: 180 LBS | BODY MASS INDEX: 28.25 KG/M2 | HEIGHT: 67 IN

## 2022-01-19 PROCEDURE — G0296 VISIT TO DETERM LDCT ELIG: CPT

## 2022-01-19 NOTE — PLAN
[FreeTextEntry1] : \par Plan:\par -Low Dose CT chest for lung cancer screening\par -Follow up with patient will follow with her referring provider after her LDCT for results \par -Encouraged smoking cessation\par -Referral to [facility]\par \par \par

## 2022-01-19 NOTE — HISTORY OF PRESENT ILLNESS
[TextBox_13] : She denies hemoptysis, denies new cough, denies unexplained weight loss.\par She is a current smoker with a 41 pack year smoking history (1PPD x 41 years).  She denies family h/o lung cancer.  She is referred by Dr. Elaine Meyers.

## 2022-01-21 ENCOUNTER — OUTPATIENT (OUTPATIENT)
Dept: OUTPATIENT SERVICES | Facility: HOSPITAL | Age: 72
LOS: 1 days | End: 2022-01-21
Payer: COMMERCIAL

## 2022-01-21 ENCOUNTER — APPOINTMENT (OUTPATIENT)
Dept: CT IMAGING | Facility: IMAGING CENTER | Age: 72
End: 2022-01-21
Payer: COMMERCIAL

## 2022-01-21 DIAGNOSIS — G56.01 CARPAL TUNNEL SYNDROME, RIGHT UPPER LIMB: Chronic | ICD-10-CM

## 2022-01-21 DIAGNOSIS — Z98.890 OTHER SPECIFIED POSTPROCEDURAL STATES: Chronic | ICD-10-CM

## 2022-01-21 DIAGNOSIS — Z00.8 ENCOUNTER FOR OTHER GENERAL EXAMINATION: ICD-10-CM

## 2022-01-21 DIAGNOSIS — S98.139A COMPLETE TRAUMATIC AMPUTATION OF ONE UNSPECIFIED LESSER TOE, INITIAL ENCOUNTER: Chronic | ICD-10-CM

## 2022-01-21 DIAGNOSIS — Z95.5 PRESENCE OF CORONARY ANGIOPLASTY IMPLANT AND GRAFT: Chronic | ICD-10-CM

## 2022-01-21 PROCEDURE — 71271 CT THORAX LUNG CANCER SCR C-: CPT

## 2022-01-21 PROCEDURE — 71271 CT THORAX LUNG CANCER SCR C-: CPT | Mod: 26

## 2022-01-26 ENCOUNTER — RX RENEWAL (OUTPATIENT)
Age: 72
End: 2022-01-26

## 2022-01-26 RX ORDER — ANASTROZOLE TABLETS 1 MG/1
1 TABLET ORAL DAILY
Qty: 30 | Refills: 0 | Status: ACTIVE | COMMUNITY
Start: 2021-04-30 | End: 1900-01-01

## 2022-01-31 ENCOUNTER — APPOINTMENT (OUTPATIENT)
Dept: PRIMARY CARE | Facility: HOSPITAL | Age: 72
End: 2022-01-31

## 2022-01-31 ENCOUNTER — OUTPATIENT (OUTPATIENT)
Dept: OUTPATIENT SERVICES | Facility: HOSPITAL | Age: 72
LOS: 1 days | End: 2022-01-31

## 2022-01-31 VITALS
SYSTOLIC BLOOD PRESSURE: 142 MMHG | HEIGHT: 67 IN | WEIGHT: 180 LBS | HEART RATE: 67 BPM | OXYGEN SATURATION: 98 % | TEMPERATURE: 97.7 F | DIASTOLIC BLOOD PRESSURE: 62 MMHG | BODY MASS INDEX: 28.25 KG/M2

## 2022-01-31 DIAGNOSIS — R05.9 COUGH, UNSPECIFIED: ICD-10-CM

## 2022-01-31 DIAGNOSIS — S98.139A COMPLETE TRAUMATIC AMPUTATION OF ONE UNSPECIFIED LESSER TOE, INITIAL ENCOUNTER: Chronic | ICD-10-CM

## 2022-01-31 DIAGNOSIS — J20.8 ACUTE BRONCHITIS DUE TO OTHER SPECIFIED ORGANISMS: ICD-10-CM

## 2022-01-31 DIAGNOSIS — Z98.890 OTHER SPECIFIED POSTPROCEDURAL STATES: Chronic | ICD-10-CM

## 2022-01-31 DIAGNOSIS — Z95.5 PRESENCE OF CORONARY ANGIOPLASTY IMPLANT AND GRAFT: Chronic | ICD-10-CM

## 2022-01-31 DIAGNOSIS — G56.01 CARPAL TUNNEL SYNDROME, RIGHT UPPER LIMB: Chronic | ICD-10-CM

## 2022-01-31 LAB — SARS-COV-2 N GENE NPH QL NAA+PROBE: NOT DETECTED

## 2022-01-31 RX ORDER — AZITHROMYCIN 250 MG/1
250 TABLET, FILM COATED ORAL
Qty: 1 | Refills: 0 | Status: ACTIVE | COMMUNITY
Start: 2022-01-31 | End: 1900-01-01

## 2022-01-31 RX ORDER — PROMETHAZINE HYDROCHLORIDE 6.25 MG/5ML
6.25 SOLUTION ORAL
Qty: 140 | Refills: 0 | Status: ACTIVE | COMMUNITY
Start: 2022-01-31 | End: 1900-01-01

## 2022-01-31 RX ORDER — CIPROFLOXACIN HYDROCHLORIDE 250 MG/1
250 TABLET, FILM COATED ORAL
Qty: 10 | Refills: 0 | Status: DISCONTINUED | COMMUNITY
Start: 2021-01-14 | End: 2022-01-31

## 2022-01-31 NOTE — HISTORY OF PRESENT ILLNESS
[FreeTextEntry8] : 71F NKDA with PMH current smoker 1 pack per day, HTN, HLD, Stent and PSH of right foot who came to my [facility] due to cough.\par \par States that she has productive cough for the past 5-7 days. Described as yellowish to greenish secretions with no fever and no chills. She still smokes 1 pack per day. With regards to COVID, she has been fully vaccinated since March 2021. She received her 3rd COVID booster and flu shot. Denies any fever, sore throat or SOB. No loss of smell or taste, no chest tightness, or any GI related symptoms of nausea, vomiting or diarrhea. 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 She takes several maintenance medications. Denies any chest pain, no chest palpitations or any respiratory distress\par \par \par \par

## 2022-01-31 NOTE — PHYSICAL EXAM
[No Acute Distress] : no acute distress [Normal Sclera/Conjunctiva] : normal sclera/conjunctiva [Normal Outer Ear/Nose] : the outer ears and nose were normal in appearance [No Respiratory Distress] : no respiratory distress  [No Accessory Muscle Use] : no accessory muscle use [Normal Rate] : normal rate  [Regular Rhythm] : with a regular rhythm [No Focal Deficits] : no focal deficits [Normal Gait] : normal gait [Normal Affect] : the affect was normal [Normal Insight/Judgement] : insight and judgment were intact [de-identified] : slight maxillary tenderness, no tonsular exudates and no tonsular swelling  [de-identified] : diminished lungs sounds on B/L lobe; no wheezing and no rhonchi  [de-identified] : no chest tenderness

## 2022-02-07 NOTE — H&P ADULT - PROBLEM SELECTOR PLAN 1
No
Monitor on telemetry, serial EKG and Savage prn for episodes of chest pain  HgbA1C, TSH, lipid profile, CBC, CMP in am   Consider ischemic workup with NST vs LHC this admission   TTE ordered   Continue with Aspirin 81mg and Plavix 75mg daily

## 2022-03-01 ENCOUNTER — APPOINTMENT (OUTPATIENT)
Dept: ULTRASOUND IMAGING | Facility: IMAGING CENTER | Age: 72
End: 2022-03-01
Payer: COMMERCIAL

## 2022-03-01 ENCOUNTER — OUTPATIENT (OUTPATIENT)
Dept: OUTPATIENT SERVICES | Facility: HOSPITAL | Age: 72
LOS: 1 days | End: 2022-03-01
Payer: COMMERCIAL

## 2022-03-01 ENCOUNTER — APPOINTMENT (OUTPATIENT)
Dept: MAMMOGRAPHY | Facility: IMAGING CENTER | Age: 72
End: 2022-03-01
Payer: COMMERCIAL

## 2022-03-01 DIAGNOSIS — G56.01 CARPAL TUNNEL SYNDROME, RIGHT UPPER LIMB: Chronic | ICD-10-CM

## 2022-03-01 DIAGNOSIS — Z00.8 ENCOUNTER FOR OTHER GENERAL EXAMINATION: ICD-10-CM

## 2022-03-01 DIAGNOSIS — Z98.890 OTHER SPECIFIED POSTPROCEDURAL STATES: Chronic | ICD-10-CM

## 2022-03-01 DIAGNOSIS — S98.139A COMPLETE TRAUMATIC AMPUTATION OF ONE UNSPECIFIED LESSER TOE, INITIAL ENCOUNTER: Chronic | ICD-10-CM

## 2022-03-01 DIAGNOSIS — Z95.5 PRESENCE OF CORONARY ANGIOPLASTY IMPLANT AND GRAFT: Chronic | ICD-10-CM

## 2022-03-01 PROCEDURE — 77066 DX MAMMO INCL CAD BI: CPT

## 2022-03-01 PROCEDURE — 76641 ULTRASOUND BREAST COMPLETE: CPT | Mod: 26,50

## 2022-03-01 PROCEDURE — 77062 BREAST TOMOSYNTHESIS BI: CPT | Mod: 26

## 2022-03-01 PROCEDURE — 77066 DX MAMMO INCL CAD BI: CPT | Mod: 26

## 2022-03-01 PROCEDURE — 76641 ULTRASOUND BREAST COMPLETE: CPT

## 2022-03-01 PROCEDURE — G0279: CPT

## 2022-12-03 ENCOUNTER — EMERGENCY (EMERGENCY)
Facility: HOSPITAL | Age: 72
LOS: 1 days | Discharge: HOME | End: 2022-12-03
Attending: EMERGENCY MEDICINE | Admitting: EMERGENCY MEDICINE

## 2022-12-03 VITALS
HEART RATE: 66 BPM | TEMPERATURE: 98 F | DIASTOLIC BLOOD PRESSURE: 53 MMHG | RESPIRATION RATE: 16 BRPM | SYSTOLIC BLOOD PRESSURE: 151 MMHG | OXYGEN SATURATION: 97 %

## 2022-12-03 DIAGNOSIS — Z95.5 PRESENCE OF CORONARY ANGIOPLASTY IMPLANT AND GRAFT: Chronic | ICD-10-CM

## 2022-12-03 DIAGNOSIS — G56.01 CARPAL TUNNEL SYNDROME, RIGHT UPPER LIMB: Chronic | ICD-10-CM

## 2022-12-03 DIAGNOSIS — Z98.890 OTHER SPECIFIED POSTPROCEDURAL STATES: Chronic | ICD-10-CM

## 2022-12-03 DIAGNOSIS — S98.139A COMPLETE TRAUMATIC AMPUTATION OF ONE UNSPECIFIED LESSER TOE, INITIAL ENCOUNTER: Chronic | ICD-10-CM

## 2022-12-03 LAB
ALBUMIN SERPL ELPH-MCNC: 4.1 G/DL — SIGNIFICANT CHANGE UP (ref 3.3–5)
ALP SERPL-CCNC: 100 U/L — SIGNIFICANT CHANGE UP (ref 40–120)
ALT FLD-CCNC: 17 U/L — SIGNIFICANT CHANGE UP (ref 4–33)
ANION GAP SERPL CALC-SCNC: 9 MMOL/L — SIGNIFICANT CHANGE UP (ref 7–14)
APTT BLD: 30.7 SEC — SIGNIFICANT CHANGE UP (ref 27–36.3)
AST SERPL-CCNC: 31 U/L — SIGNIFICANT CHANGE UP (ref 4–32)
BASOPHILS # BLD AUTO: 0.04 K/UL — SIGNIFICANT CHANGE UP (ref 0–0.2)
BASOPHILS NFR BLD AUTO: 0.5 % — SIGNIFICANT CHANGE UP (ref 0–2)
BILIRUB SERPL-MCNC: 0.4 MG/DL — SIGNIFICANT CHANGE UP (ref 0.2–1.2)
BUN SERPL-MCNC: 25 MG/DL — HIGH (ref 7–23)
CALCIUM SERPL-MCNC: 9.4 MG/DL — SIGNIFICANT CHANGE UP (ref 8.4–10.5)
CHLORIDE SERPL-SCNC: 103 MMOL/L — SIGNIFICANT CHANGE UP (ref 98–107)
CO2 SERPL-SCNC: 26 MMOL/L — SIGNIFICANT CHANGE UP (ref 22–31)
CREAT SERPL-MCNC: 0.74 MG/DL — SIGNIFICANT CHANGE UP (ref 0.5–1.3)
EGFR: 86 ML/MIN/1.73M2 — SIGNIFICANT CHANGE UP
EOSINOPHIL # BLD AUTO: 0.24 K/UL — SIGNIFICANT CHANGE UP (ref 0–0.5)
EOSINOPHIL NFR BLD AUTO: 3 % — SIGNIFICANT CHANGE UP (ref 0–6)
GLUCOSE SERPL-MCNC: 90 MG/DL — SIGNIFICANT CHANGE UP (ref 70–99)
HCT VFR BLD CALC: 39.3 % — SIGNIFICANT CHANGE UP (ref 34.5–45)
HGB BLD-MCNC: 13.2 G/DL — SIGNIFICANT CHANGE UP (ref 11.5–15.5)
IANC: 4.87 K/UL — SIGNIFICANT CHANGE UP (ref 1.8–7.4)
IMM GRANULOCYTES NFR BLD AUTO: 0.4 % — SIGNIFICANT CHANGE UP (ref 0–0.9)
INR BLD: 1.16 RATIO — SIGNIFICANT CHANGE UP (ref 0.88–1.16)
LYMPHOCYTES # BLD AUTO: 1.89 K/UL — SIGNIFICANT CHANGE UP (ref 1–3.3)
LYMPHOCYTES # BLD AUTO: 24 % — SIGNIFICANT CHANGE UP (ref 13–44)
MCHC RBC-ENTMCNC: 33.6 GM/DL — SIGNIFICANT CHANGE UP (ref 32–36)
MCHC RBC-ENTMCNC: 33.8 PG — SIGNIFICANT CHANGE UP (ref 27–34)
MCV RBC AUTO: 100.5 FL — HIGH (ref 80–100)
MONOCYTES # BLD AUTO: 0.82 K/UL — SIGNIFICANT CHANGE UP (ref 0–0.9)
MONOCYTES NFR BLD AUTO: 10.4 % — SIGNIFICANT CHANGE UP (ref 2–14)
NEUTROPHILS # BLD AUTO: 4.87 K/UL — SIGNIFICANT CHANGE UP (ref 1.8–7.4)
NEUTROPHILS NFR BLD AUTO: 61.7 % — SIGNIFICANT CHANGE UP (ref 43–77)
NRBC # BLD: 0 /100 WBCS — SIGNIFICANT CHANGE UP (ref 0–0)
NRBC # FLD: 0 K/UL — SIGNIFICANT CHANGE UP (ref 0–0)
PLATELET # BLD AUTO: 249 K/UL — SIGNIFICANT CHANGE UP (ref 150–400)
POTASSIUM SERPL-MCNC: 4.4 MMOL/L — SIGNIFICANT CHANGE UP (ref 3.5–5.3)
POTASSIUM SERPL-SCNC: 4.4 MMOL/L — SIGNIFICANT CHANGE UP (ref 3.5–5.3)
PROT SERPL-MCNC: 7.7 G/DL — SIGNIFICANT CHANGE UP (ref 6–8.3)
PROTHROM AB SERPL-ACNC: 13.5 SEC — HIGH (ref 10.5–13.4)
RBC # BLD: 3.91 M/UL — SIGNIFICANT CHANGE UP (ref 3.8–5.2)
RBC # FLD: 12.7 % — SIGNIFICANT CHANGE UP (ref 10.3–14.5)
SODIUM SERPL-SCNC: 138 MMOL/L — SIGNIFICANT CHANGE UP (ref 135–145)
WBC # BLD: 7.89 K/UL — SIGNIFICANT CHANGE UP (ref 3.8–10.5)
WBC # FLD AUTO: 7.89 K/UL — SIGNIFICANT CHANGE UP (ref 3.8–10.5)

## 2022-12-03 PROCEDURE — 73600 X-RAY EXAM OF ANKLE: CPT | Mod: 26,LT

## 2022-12-03 PROCEDURE — 99284 EMERGENCY DEPT VISIT MOD MDM: CPT

## 2022-12-03 PROCEDURE — 93971 EXTREMITY STUDY: CPT | Mod: 26,LT

## 2022-12-03 RX ORDER — ACETAMINOPHEN 500 MG
650 TABLET ORAL ONCE
Refills: 0 | Status: COMPLETED | OUTPATIENT
Start: 2022-12-03 | End: 2022-12-03

## 2022-12-03 RX ADMIN — Medication 650 MILLIGRAM(S): at 11:51

## 2022-12-03 NOTE — ED PROVIDER NOTE - NS ED ROS FT
General: denies fever, chills  CV: denies chest pain, palpitations  Resp: denies difficulty breathing, cough  Abdominal: denies nausea, vomiting, diarrhea, abdominal pain  MSK: left leg swelling and tenderness medial malleoli  Skin: denies rashes, erythema or skin changes

## 2022-12-03 NOTE — ED PROVIDER NOTE - CLINICAL SUMMARY MEDICAL DECISION MAKING FREE TEXT BOX
71 yo f pmhx htn hld CAD stent 2018, on xarelto presents to ed with two weeks LE swelling and pain over medial malleoli. denies injury/trauma to the area. has hx of DVT left leg. on exam +left lower extremity swelling extending from medial malleoli to medial calf with tenderness over medial malleoli.  concern for DVT vs occult fx so will get LE ultrasound and xray ankle and give tylenol for pain. low likelihood cellulitis no demarcated area of erythema or skin change, warmth or signs of cellulitic infection. 71 yo f pmhx htn hld CAD stent 2018, on xarelto presents to ed with two weeks LE swelling and pain over medial malleoli. denies injury/trauma to the area. has hx of DVT left leg. on exam +left lower extremity swelling extending from medial malleoli to medial calf with tenderness over medial malleoli.  concern for DVT vs occult fx so will get LE ultrasound and xray ankle and give tylenol for pain. also on ddx gout pending results. low likelihood cellulitis no demarcated area of erythema or skin change, warmth or signs of cellulitic infection.

## 2022-12-03 NOTE — ED PROVIDER NOTE - NSFOLLOWUPINSTRUCTIONS_ED_ALL_ED_FT
No signs of emergency medical condition on today's workup.  Presumptive diagnosis made as gout, but further evaluation may be required by your primary care doctor or specialist for a definitive diagnosis. Take Naproxen daily as prescribed. Return to the ED if you have increased swelling, pain, warmth or fever/chills or any other concerning symptoms. Therefore, follow up as directed and if symptoms change/worsen or any emergency conditions, please return to the ER.

## 2022-12-03 NOTE — ED PROVIDER NOTE - ATTENDING CONTRIBUTION TO CARE
Attending note:   After face to face evaluation of this patient, I concur with above noted hx, pe, and care plan for this patient.  Pena: 72-year-old female with a history of hypertension, hyperlipidemia and coronary disease with history of stent on Xarelto.  Patient comes in with 2 weeks of worsening lower extremity pain now with swelling for the last few days.  Pain is worse upon standing and with exertion without any numbness or tingling.  Patient denies any trauma.  Patient with good pulse in the left leg there is significant edema over the medial malleolus with tenderness and mild warmth edema goes from distal ankle up to 6 cm proximal to medial malleolus.  There is no significant erythema noted but there is tenderness.  There is no calf tenderness noted.  No swollen remedy and negative straight leg raise.  Motor is 5 out of 5.  Patient with medial malleolus edema and pain worse with standing.  Differential diagnosis includes DVT, gout, bony injury unlikely to be vascular given normal pulse.  Will check x-ray, ultrasound and labs.  If all negative will treat with NSAIDs for possible gout.

## 2022-12-03 NOTE — ED PROVIDER NOTE - WR ORDER STATUS 1
"Patient alert and oriented. Calm and cooperative. Sitter outside of the room this shift, but sitter was discontinued since this PM. Keep Bed alarm on.   VSS, RA.  Pt had low BG at 60s this AM, sugar snacks offered and pt vomited later and c/o \"too sweet\". Pt refused to drink nectar thin water, ice chips offered and patient tolerated well. Had good appetite for breakfast and lunch, and consumed 100% of the meals.   Pt able to tell staff about his needs. Pt was educated about using call light to make needs known and need to be educated repeatedly.  Pt voiding in the toilet without difficulty. No BM this shift. LBM 1/10.  Pt was able to walk to bathroom under supervision without difficulties. Denied dizziness, pain, chest pain, SOB, numbness and tinglings.  BLE wounds was cleaned and dressings was changed. Buttock skin intact, protected by meplix.   Plan to discharge TCU this weekend for 24/7 supervision care.  Call light within reach. Continue with care of plan.       " Resulted

## 2022-12-03 NOTE — ED PROVIDER NOTE - NSICDXPASTSURGICALHX_GEN_ALL_CORE_FT
PAST SURGICAL HISTORY:  Amputation of toe right middle toe    Carpal tunnel syndrome, bilateral b/l wrist surgery for carpal tunnel syndrome     delivery delivered x2    S/P foot surgery, right Bunionectomy and hammer toe with multiple revisions x 5    Stented coronary artery x 6 most recent 18

## 2022-12-03 NOTE — ED PROVIDER NOTE - PATIENT PORTAL LINK FT
You can access the FollowMyHealth Patient Portal offered by [facility] by registering at the following website: [email or web address]. By joining [facility]’s FollowMyHealth portal, you will also be able to view your health information using other applications (apps) compatible with our system.

## 2022-12-03 NOTE — ED ADULT NURSE REASSESSMENT NOTE - NS ED NURSE REASSESS COMMENT FT1
pt alert,oriented  x 3 , reports pain l ft injury 2 wks ago- sl swelling noted. eval by md,labs sent. will continue to monitor

## 2022-12-03 NOTE — ED PROVIDER NOTE - PHYSICAL EXAMINATION
GENERAL: well appearing in no acute distress, non-toxic appearing  CARDIAC: regular rate and rhythm, normal S1S2, no appreciable murmurs,  PULM: normal breath sounds, clear to ascultation bilaterally, no rales, rhonchi, wheezing  MSK: left foot/calf non pitting edema with tenderness over medial malleoli, full rom of ankle and metatarsals, sensory intact, neurovascularly intact.   SKIN: no palpable cord or visible skin change  PSYCH: appropriate mood and affect

## 2022-12-03 NOTE — ED PROVIDER NOTE - NSICDXPASTMEDICALHX_GEN_ALL_CORE_FT
PAST MEDICAL HISTORY:  Anxiety     Bradycardia     CAD (coronary artery disease) 6 stents, most recent 7/24/18    Depression     GERD (gastroesophageal reflux disease)     Hyperlipidemia     Hypertension     Macular hole s/p vitrectomy    Malignant neoplasm of left breast     Other chronic osteomyelitis of foot [id number]    Smoker Current smoker 40 pack years

## 2022-12-03 NOTE — ED PROVIDER NOTE - OBJECTIVE STATEMENT
73 yo f pmhx HTN HLD on xarelto presents to ed with left lower extremity swelling x 2 weeks. pt denies any  trauma/injury to the area. says the pain is shooting and has associated swelling from medial malleolus to medial calf. pt reports she had one DVT in the past. denies any f/c/cp,sob/ abdominal pain/n/v.

## 2022-12-03 NOTE — ED ADULT TRIAGE NOTE - CHIEF COMPLAINT QUOTE
Pt c/o pain to left foot x 2 weeks, denies injury, but on her feet all day for work ([employer] [facility] [employer] [place] Pt reports difficulty ambulating due to pain.

## 2022-12-06 NOTE — ED PROVIDER NOTE - NSTIMEPROVIDERCAREINITIATE_GEN_ER
Pre-op Diagnosis: PRIMARY OSTEOARTHRITIS OF RIGHT KNEE, CHRONIC PAIN OF RIGHT KNEE    The above referenced H&P was reviewed by Jaimee Pires MD on 12/6/2022, the patient was examined and no significant changes have occurred in the patient's condition since the H&P was performed. I discussed with the patient and/or legal representative the potential benefits, risks and side effects of this procedure; the likelihood of the patient achieving goals; and potential problems that might occur during recuperation. I discussed reasonable alternatives to the procedure, including risks, benefits and side effects related to the alternatives and risks related to not receiving this procedure. We will proceed with procedure as planned.
04-Sep-2018 12:23

## 2022-12-15 NOTE — ASU PREOP CHECKLIST - HEART RATE (BEATS/MIN)
Radiology History & Physical      SUBJECTIVE:         History of Present Illness:  Vinnie Siegel is a 77 y.o. male ESRD on HD with HD line placed by IR on 09/29/22 after AVF malfuction presents for tunneled HD line exchange. No AC.        Past Medical History:   Diagnosis Date    Arteriovenous fistula stenosis     Cataract     Chronic kidney disease     Colon polyp     Diabetes mellitus     Diabetes mellitus type II     Glaucoma suspect with open angle     Hyperlipidemia     Hypertension     Kidney stone     Seasonal allergies 6/24/2014     Past Surgical History:   Procedure Laterality Date    AV FISTULA PLACEMENT Left 12/8/2020    Procedure: CREATION, AV FISTULA;  Surgeon: Benji Becerril MD;  Location: [facility] OR [address] [place] [address];  Service: Peripheral Vascular;  Laterality: Left;    AV FISTULA PLACEMENT Right 12/7/2022    Procedure: CREATION, AV FISTULA;  Surgeon: Benji eBcerril MD;  Location: [facility] OR [address] [place] [address];  Service: Peripheral Vascular;  Laterality: Right;  RUE    CATARACT EXTRACTION W/  INTRAOCULAR LENS IMPLANT Right 04/04/16    Dr Meehan    COLONOSCOPY W/ BIOPSIES      ESOPHAGOGASTRODUODENOSCOPY N/A 6/29/2020    Procedure: EGD (ESOPHAGOGASTRODUODENOSCOPY);  Surgeon: Ravi Leone MD;  Location: [facility];  Service: Endoscopy;  Laterality: N/A;    EYE SURGERY      FISTULOGRAM Left 4/16/2021    Procedure: Fistulogram;  Surgeon: Benji Becerril MD;  Location: [facility] CATH LAB;  Service: Peripheral Vascular;  Laterality: Left;    FISTULOGRAM Left 9/28/2022    Procedure: Fistulogram;  Surgeon: Benji Becerril MD;  Location: [facility] CATH LAB;  Service: Cardiology;  Laterality: Left;    HEMORRHOID SURGERY      [facility]    lateral internal anal sphincterotomy  12/13/13    [facility]    PERCUTANEOUS TRANSLUMINAL ANGIOPLASTY OF ARTERIOVENOUS FISTULA Left 4/16/2021    Procedure: PTA, AV FISTULA;  Surgeon: Benji Becerril MD;  Location: [facility] CATH LAB;  Service: Peripheral Vascular;  Laterality:  Left;    PERCUTANEOUS TRANSLUMINAL ANGIOPLASTY OF ARTERIOVENOUS FISTULA N/A 9/28/2022    Procedure: PTA, AV FISTULA;  Surgeon: Benji Becerril MD;  Location: [facility] CATH LAB;  Service: Cardiology;  Laterality: N/A;    URETERAL STENT PLACEMENT         Home Meds:   Prior to Admission medications    Medication Sig Start Date End Date Taking? Authorizing Provider   ergocalciferol (ERGOCALCIFEROL) 50,000 unit Cap Take 1 capsule (50,000 Units total) by mouth every 7 days.  Patient taking differently: Take 50,000 Units by mouth every 7 days. Patient takes on Mondays 11/9/20  Yes Yisel Ramírez DO   ferrous sulfate (FEOSOL) 325 mg (65 mg iron) Tab tablet 3 TABS A DAY.  Patient taking differently: every evening. 3 TABS A DAY. 1/6/21  Yes Yisel Ramírez DO   hydrALAZINE (APRESOLINE) 100 MG tablet Take 1 tablet (100 mg total) by mouth 3 (three) times daily. 8/24/22  Yes Janeth Walter MD   insulin (LANTUS SOLOSTAR U-100 INSULIN) glargine 100 units/mL SubQ pen INJECT 28-36 UNITS UNDER THE SKIN AT NIGHT 10/5/22  Yes Janeth Walter MD   insulin lispro (HUMALOG KWIKPEN INSULIN) 100 unit/mL pen INJECT 8-12 UNITS EVERY DAY WITH MEALS PLUS SCALE. MAX DAILY50 UNITS 7/6/22  Yes Dariusz Torres, APRN, FNP   metoprolol succinate (TOPROL-XL) 25 MG 24 hr tablet Take 0.5 tablets (12.5 mg total) by mouth once daily. 8/24/22 8/24/23 Yes Janeth Walter MD   NIFEdipine (PROCARDIA-XL) 90 MG (OSM) 24 hr tablet Take 1 tablet (90 mg total) by mouth once daily. 8/24/22  Yes Janeth Walter MD   pantoprazole (PROTONIX) 40 MG tablet TAKE 1 TABLET(40 MG) BY MOUTH EVERY DAY 8/18/22  Yes Janeth Walter MD   tamsulosin (FLOMAX) 0.4 mg Cap TAKE ONE CAPSULE BY MOUTH EVERY MORNING 5/20/22  Yes Janeth Walter MD   timolol maleate 0.25% (TIMOPTIC) 0.25 % Drop PLACE 1 DROP IN BOTH EYES TWICE DAILY 12/27/21  Yes Judy Manriquez, OD   torsemide (DEMADEX) 20 MG Tab Take 1 tablet (20 mg total) by mouth 2 (two) times daily. 8/24/22  Yes Janeth Walter MD  "  apixaban (ELIQUIS) 2.5 mg Tab Take 1 tablet (2.5 mg total) by mouth 2 (two) times daily. 22   Justina Patterson MD   atorvastatin (LIPITOR) 40 MG tablet Take 1 tablet (40 mg total) by mouth once daily. 22   Janeth Walter MD   blood sugar diagnostic (TRUE METRIX GLUCOSE TEST STRIP) Strp Use to check blood glucose 3-5 times daily as directed. 10/10/22   Sabina Saenz MD   blood sugar diagnostic Strp To check BG 4 times daily, to use with insurance preferred meter 10/14/20   MACK Wood FNP   blood-glucose meter kit To check BG 4 times daily, to use with insurance preferred meter, e 11.65 10/14/20 10/14/21  MACK Wood FNP   calcium acetate,phosphat bind, (PHOSLO) 667 mg capsule SMARTSI Capsule(s) By Mouth 22   Historical Provider   fluticasone propionate (FLONASE) 50 mcg/actuation nasal spray SHAKE LIQUID AND USE 2 SPRAYS(100 MCG) IN EACH NOSTRIL EVERY DAY 21   Janeth Walter MD   HYDROcodone-acetaminophen (NORCO) 7.5-325 mg per tablet Take 1 tablet by mouth 3 (three) times daily as needed. 22   Historical Provider   ipratropium-albuteroL (COMBIVENT)  mcg/actuation inhaler Inhale 1 puff into the lungs every 6 (six) hours as needed for Wheezing or Shortness of Breath. Rescue 21   Janeth Walter MD   lancets [facility] To check BG 4 times daily, to use with insurance preferred meter 10/14/20   MACK Wood FNP   pen needle, diabetic (BD ULTRA-FINE SHORT PEN NEEDLE) 31 gauge x 5/16" Ndle USE FOUR TIMES DAILY 10/11/21   MACK Wood FNP   insulin (LANTUS SOLOSTAR U-100 INSULIN) glargine 100 units/mL SubQ pen Inject 28-36 units at night. 10/5/22 12/15/22  Janeth Walter MD     Anticoagulants/Antiplatelets: no anticoagulation    Allergies: Review of patient's allergies indicates:  No Known Allergies  Sedation History:  no adverse reactions    Review of Systems:   Hematological: no known coagulopathies  Respiratory: no shortness of " breath  Cardiovascular: no chest pain  Gastrointestinal: no abdominal pain  Genito-Urinary: no dysuria  Musculoskeletal: negative  Neurological: negative         OBJECTIVE:     Vital Signs (Most Recent)  Temp: 98.7 °F (37.1 °C) (12/15/22 0712)  Pulse: 67 (12/15/22 0712)  Resp: 20 (12/15/22 0712)  BP: 139/68 (12/15/22 0728)  SpO2: 99 % (12/15/22 0712)    Physical Exam:  ASA: 3  Mallampati: 3    General: no acute distress  Mental Status: alert and oriented to person, place and time  HEENT: normocephalic, atraumatic  Chest: unlabored breathing  Abdomen: nondistended  Extremity: moves all extremities    Laboratory  Lab Results   Component Value Date    INR 1.0 12/15/2022       Lab Results   Component Value Date    WBC 4.14 11/23/2022    HGB 11.5 (L) 11/23/2022    HCT 39 12/07/2022    MCV 91 11/23/2022     11/23/2022      Lab Results   Component Value Date     (HH) 11/23/2022     (L) 11/23/2022    K 3.8 11/23/2022    CL 97 11/23/2022    CO2 24 11/23/2022    BUN 39 (H) 11/23/2022    CREATININE 5.7 (H) 11/23/2022    CALCIUM 7.5 (L) 11/23/2022    MG 1.6 09/29/2022    ALT 10 11/23/2022    AST 9 (L) 11/23/2022    ALBUMIN 2.8 (L) 11/23/2022    BILITOT 0.4 11/23/2022    BILIDIR 0.2 01/06/2021       ASSESSMENT/PLAN:     Sedation Plan: up to moderate   Patient will undergo Tunneled HD line exchange.        Shima Pereira MD  Radiology PGY IV     53

## 2023-03-03 NOTE — H&P PST ADULT - VASCULAR
SPIRITUAL HEALTH SERVICES  SPIRITUAL ASSESSMENT Progress Note  [facility] 3C     REFERRAL SOURCE: Pre surgical    Provided prayer and support to pt and family.      PLAN: SHS will remain available     Mirtha Chase  Pager: [phone]     detailed exam

## 2023-03-10 ENCOUNTER — OUTPATIENT (OUTPATIENT)
Dept: OUTPATIENT SERVICES | Facility: HOSPITAL | Age: 73
LOS: 1 days | End: 2023-03-10
Payer: COMMERCIAL

## 2023-03-10 ENCOUNTER — APPOINTMENT (OUTPATIENT)
Dept: ULTRASOUND IMAGING | Facility: IMAGING CENTER | Age: 73
End: 2023-03-10

## 2023-03-10 ENCOUNTER — APPOINTMENT (OUTPATIENT)
Dept: MAMMOGRAPHY | Facility: IMAGING CENTER | Age: 73
End: 2023-03-10
Payer: COMMERCIAL

## 2023-03-10 DIAGNOSIS — Z00.8 ENCOUNTER FOR OTHER GENERAL EXAMINATION: ICD-10-CM

## 2023-03-10 DIAGNOSIS — Z95.5 PRESENCE OF CORONARY ANGIOPLASTY IMPLANT AND GRAFT: Chronic | ICD-10-CM

## 2023-03-10 DIAGNOSIS — Z98.890 OTHER SPECIFIED POSTPROCEDURAL STATES: Chronic | ICD-10-CM

## 2023-03-10 DIAGNOSIS — G56.01 CARPAL TUNNEL SYNDROME, RIGHT UPPER LIMB: Chronic | ICD-10-CM

## 2023-03-10 DIAGNOSIS — S98.139A COMPLETE TRAUMATIC AMPUTATION OF ONE UNSPECIFIED LESSER TOE, INITIAL ENCOUNTER: Chronic | ICD-10-CM

## 2023-03-10 PROCEDURE — 77062 BREAST TOMOSYNTHESIS BI: CPT | Mod: 26

## 2023-03-10 PROCEDURE — G0279: CPT

## 2023-03-10 PROCEDURE — 77066 DX MAMMO INCL CAD BI: CPT | Mod: 26

## 2023-03-10 PROCEDURE — 76641 ULTRASOUND BREAST COMPLETE: CPT

## 2023-03-10 PROCEDURE — 77066 DX MAMMO INCL CAD BI: CPT

## 2023-03-10 PROCEDURE — 76641 ULTRASOUND BREAST COMPLETE: CPT | Mod: 26,50

## 2023-03-15 ENCOUNTER — NON-APPOINTMENT (OUTPATIENT)
Age: 73
End: 2023-03-15

## 2023-03-15 VITALS — BODY MASS INDEX: 27.78 KG/M2 | WEIGHT: 177 LBS | HEIGHT: 67 IN

## 2023-03-15 NOTE — REASON FOR VISIT
[Home] : at home, [unfilled] , at the time of the visit. [Medical Office: ([facility])___] : at the medical office located in  [Verbal consent obtained from patient] : the patient, [unfilled] [Annual Follow-Up] : an annual follow-up visit [Review of Eligibility] : review of eligibility [Low-Dose CT Screening Discussion] : low-dose CT lung cancer screening discussion [Virtual Visit] : virtual visit

## 2023-03-15 NOTE — HISTORY OF PRESENT ILLNESS
[Current] : Current [TextBox_13] : Referred by Dr. Meyers\par \par Ms. MARTITA BARRY is a 72 year old woman with a history of nicotine dependence\par \par  Over the telephone today we reviewed and confirmed that the patient meets screening eligibility criteria:\par \par -Age: 72 years old\par \par Smoking status:\par \par \par -Current smoker\par \par -Number of pack(s) per day:1\par \par -Number of years smoked:42\par \par -Number of pack years smokin\par \par \par \par \par Ms. BARRY denies any personal history of lung cancer. Denies any s/s of lung cancer. No lung cancer in a 1st degree relative. Denies any history of lung disease. Denies any history of occupational exposures. H/o of breast ca. [PacksperYear] : 41

## 2023-03-15 NOTE — PLAN
[None] : no symptoms [FreeTextEntry1] : Pt returns in 6 month f/u:\par \par Pt is now 85 yo male referred by Dr. Tahir Linares. Had elevated PSA some years ago, and had biopsy with rachel 7 and underwent XRT ~ 8 years ago ([facility] out on [place]). Has been following, with now rising PSA level after zofia ~ 0.5 post XRT. No current voiding symptoms. Sexually active. No flank, abdominal, musculoskeletal pain. No hematuria.\par \par MRI done in 3/2019---PIRADS 2.\par \par PSA Hx:\par 2.78- 4/29/21\par 2.71-- 11/2020\par 2.7---7/2020\par 2.3---2/2020\par 2.3--- 9/2019\par 2.1--- 2/2019\par 1.8--- 9/2018\par 1.3--- 7/2017\par 0.94-- 6/2016 [Smoking Cessation] : smoking cessation [FreeTextEntry1] : Plan:\par \par -Low Dose CT chest for lung cancer screening scheduled for 3/16/23 at [facility]\par \par -Patient to follow up with Dr. Meyers\par \par -Encouraged smoking cessation\par \par \par Engaged in shared decision making with Ms. MARTITA BARRY . Answered all questions. She verbalized understanding and agreement. She knows to call back with any questions or concerns\par

## 2023-03-16 ENCOUNTER — OUTPATIENT (OUTPATIENT)
Dept: OUTPATIENT SERVICES | Facility: HOSPITAL | Age: 73
LOS: 1 days | End: 2023-03-16
Payer: COMMERCIAL

## 2023-03-16 ENCOUNTER — APPOINTMENT (OUTPATIENT)
Dept: CT IMAGING | Facility: IMAGING CENTER | Age: 73
End: 2023-03-16
Payer: COMMERCIAL

## 2023-03-16 DIAGNOSIS — Z98.890 OTHER SPECIFIED POSTPROCEDURAL STATES: Chronic | ICD-10-CM

## 2023-03-16 DIAGNOSIS — G56.01 CARPAL TUNNEL SYNDROME, RIGHT UPPER LIMB: Chronic | ICD-10-CM

## 2023-03-16 DIAGNOSIS — S98.139A COMPLETE TRAUMATIC AMPUTATION OF ONE UNSPECIFIED LESSER TOE, INITIAL ENCOUNTER: Chronic | ICD-10-CM

## 2023-03-16 DIAGNOSIS — Z00.8 ENCOUNTER FOR OTHER GENERAL EXAMINATION: ICD-10-CM

## 2023-03-16 DIAGNOSIS — Z95.5 PRESENCE OF CORONARY ANGIOPLASTY IMPLANT AND GRAFT: Chronic | ICD-10-CM

## 2023-03-16 PROCEDURE — 71271 CT THORAX LUNG CANCER SCR C-: CPT | Mod: 26

## 2023-03-16 PROCEDURE — 71271 CT THORAX LUNG CANCER SCR C-: CPT

## 2023-03-24 ENCOUNTER — APPOINTMENT (OUTPATIENT)
Dept: ULTRASOUND IMAGING | Facility: IMAGING CENTER | Age: 73
End: 2023-03-24
Payer: COMMERCIAL

## 2023-03-24 ENCOUNTER — OUTPATIENT (OUTPATIENT)
Dept: OUTPATIENT SERVICES | Facility: HOSPITAL | Age: 73
LOS: 1 days | End: 2023-03-24
Payer: COMMERCIAL

## 2023-03-24 DIAGNOSIS — G56.01 CARPAL TUNNEL SYNDROME, RIGHT UPPER LIMB: Chronic | ICD-10-CM

## 2023-03-24 DIAGNOSIS — S98.139A COMPLETE TRAUMATIC AMPUTATION OF ONE UNSPECIFIED LESSER TOE, INITIAL ENCOUNTER: Chronic | ICD-10-CM

## 2023-03-24 DIAGNOSIS — Z95.5 PRESENCE OF CORONARY ANGIOPLASTY IMPLANT AND GRAFT: Chronic | ICD-10-CM

## 2023-03-24 DIAGNOSIS — Z98.890 OTHER SPECIFIED POSTPROCEDURAL STATES: Chronic | ICD-10-CM

## 2023-03-24 DIAGNOSIS — Z00.8 ENCOUNTER FOR OTHER GENERAL EXAMINATION: ICD-10-CM

## 2023-03-24 PROCEDURE — 93971 EXTREMITY STUDY: CPT | Mod: 26,LT

## 2023-03-24 PROCEDURE — 93971 EXTREMITY STUDY: CPT

## 2023-03-27 ENCOUNTER — APPOINTMENT (OUTPATIENT)
Dept: ULTRASOUND IMAGING | Facility: IMAGING CENTER | Age: 73
End: 2023-03-27

## 2023-03-30 ENCOUNTER — APPOINTMENT (OUTPATIENT)
Dept: SURGICAL ONCOLOGY | Facility: CLINIC | Age: 73
End: 2023-03-30
Payer: COMMERCIAL

## 2023-03-30 VITALS
BODY MASS INDEX: 27.78 KG/M2 | SYSTOLIC BLOOD PRESSURE: 178 MMHG | HEIGHT: 67 IN | HEART RATE: 67 BPM | DIASTOLIC BLOOD PRESSURE: 75 MMHG | RESPIRATION RATE: 16 BRPM | WEIGHT: 177 LBS | OXYGEN SATURATION: 98 %

## 2023-03-30 DIAGNOSIS — C50.912 MALIGNANT NEOPLASM OF UNSPECIFIED SITE OF LEFT FEMALE BREAST: ICD-10-CM

## 2023-03-30 PROCEDURE — 99215 OFFICE O/P EST HI 40 MIN: CPT

## 2023-03-31 NOTE — PHYSICAL EXAM
[Normal] : supple, no neck mass and thyroid not enlarged [Normal Neck Lymph Nodes] : normal neck lymph nodes  [Normal Supraclavicular Lymph Nodes] : normal supraclavicular lymph nodes [Normal Axillary Lymph Nodes] : normal axillary lymph nodes [Normal] : normal appearance, no rash, nodules, vesicles, ulcers, erythema [de-identified] : Groins not examined [de-identified] : Below

## 2023-03-31 NOTE — REASON FOR VISIT
[Follow-Up Visit] : a follow-up visit for [Other: _____] : [unfilled] [FreeTextEntry2] : Left breast cancer, treated with breast conserving therapy, new palpable finding in the concern of left breast

## 2023-03-31 NOTE — ASSESSMENT
[FreeTextEntry1] : Clinically doing well.\par \par The palpable area of concern in her left breast feels like fat necrosis/asymmetric glandular tissue on physical examination, and appears to be scar tissue or fat necrosis on imaging.\par \par 3/10/2023:\par Bilateral mammogram and sonogram at 450: BI-RADS 2\par \par She would like to be further assured that there is no underlying problem.\par I suggested a breast MRI.\par She understands and agrees.\par Prescription entered.\par \par I have asked her to call me after the imaging to discuss the results.\par \par That conversation will guide her management and follow-up with me.

## 2023-03-31 NOTE — REVIEW OF SYSTEMS
[Negative] : Endocrine [FreeTextEntry5] : coronary artery disease [FreeTextEntry6] : Ex-smoker [FreeTextEntry1] : Breast cancer

## 2023-03-31 NOTE — HISTORY OF PRESENT ILLNESS
[de-identified] : Last seen 2021.\par \par 72-year-old lady.\par \par \par CC:\par A lump in her upper outer left breast that she noticed on self-examination ~2023.\par The area is asymptomatic.\par No preceding trauma or strain.\par \par No other signs or symptoms related to either breast.\par \par A subsequent left breast sonogram at 450: BI-RADS 2.\par Palpable area corresponded to a 14 x 9 x 14 cm area of fat necrosis ~6 cm FN.\par \par \par 2019:\par Left breast conserving therapy for stage I malignancy.\par Surgical pathology:\par Lumpectomy: No residual cancer.\par 0/ SLN\par \par Preoperative imaging:\par 1.  There were two additional left breast findings for which evaluation with MRI had been suggested.\par 2.  Normal ipsilateral axillary node.\par \par Preoperative breast MRI (3/10/2021:\par 1.  No right breast abnormalities.\par 2.  Area of enhancement around the biopsy clip 2.7 x 2.5 x 2.7 cm.\par 3.  All the abnormalities mentioned above had abnormal appearances on MRI; biopsies recommended.\par 4.  Re-demonstration of abnormal appearing left axillary node.\par \par 3-18-21:\par Left breast needle biopsy x2: Benign and concordant\par Left axillary needle biopsy also benign and concordant.\par \par The remainder of her preoperative imaging:\par Chest x-ray: Clear lungs.\par Abdominal ultrasound: Incidental liver cyst.\par Pelvic sonogram, no uterine abnormalities identified, ovaries poorly visualized.\par Bone scan: No evidence of metastatic disease.\par \par \par Preoperative genetic testing ([facility]): NO deleterious mutations.\par \par \par +Radiation oncology (Dr. Milla CARRASCO).\par Completed 2021\par \par 2021:\par She also met with medical oncology (Dr. Kathy WALLACE).\par Treatment with anastrozole was only for 3 months.  It was discontinued because of side effects.\par \par \par 2021:\par She was concerned about some "puffiness" in the axillary incision.\par No other specific or constitutional signs or symptoms.\par \par My exam:\par No worrisome findings associated with the axillary incision.\par + Asymptomatic seroma at lumpectomy.\par \par \par 2021, she was referred by her internist (Dr. Elaine IBARRA) with a recently diagnosed LEFT BREAST CANCER(1:00).\par This was an asymptomatic nonpalpable lesion identified on annual imaging.\par Subsequent core needle biopsy provided the above diagnosis.\par \par 2021:\par Bilateral mammography at 450: BI-RADS 0.\par Outer left breast asymmetry, additional imaging recommended.\par \par 2021:\par Diagnostic left mammogram and sonogram: BI-RADS 4.\par Persistent asymmetry in the outer left breast, corresponding with a 4 x 4 x 5 mm irregular nodule.\par \par 2021:\par Left breast core biopsy provided the above diagnosis.\par \par ADDITIONALLY, the following nodules need further evaluation by breast MRI:\par 1.  Left breast (1:00, 6 FN).\par 2.  Left breast (1:00, 5 FN)\par AND, left axillary adenopathy which was biopsied and benign.\par \par \par No other personal history of breast disease.\par No previous breast biopsies.\par \par No personal history of malignancy.\par \par + FH:\par Her identical twin sister had breast cancer in 2017, with a second metachronous ipsilateral primary in 2019.\par Her genetic testing identified no deleterious mutations.\par \par Her maternal grandmother also had breast cancer.\par \par No relatives with ovarian cancer.\par \par Not Ashkenazi.\par \par Other relatives with a history of malignancy:\par A different sister had cancer of the fallopian tubes.\par Brother with prostate cancer.\par A paternal uncle has bladder cancer.\par \par \par Menarche at 14.\par  3, para 3, first at 26.\par No HRT\par \par \par PMD: Dr. Elaine IBARRA.\par \par No pacemaker or defibrillator.\par \par +XARELTO AND ASPIRIN\par \par + CAD with PTCA at [place].\par + Palpitations.\par Cardiology: Dr. Irasema BUNCH\par \par Blood pressure control with amlodipine, carvedilol, and valsartan.\par Daily atorvastatin.\par \par Ex-smoker.\par Pulmonary: Dr. Anna ROSALES.\par 2023 \par she had a followup with the nurse practitioner: Lamar Ferguson\par \par Daily Celexa.\par She has been taking this for many years, the prescriptions are renewed by her internist.\par \par \par GYN: Dr. Luis OWUSU.\par She has not seen him since the summer .\par I suggested a follow-up visit, again.\par \par \par :\par Colonoscopy and EGD were both unremarkable.\par Recommended follow-up interval: 5 years.\par Dr. Patric GEIGER

## 2023-04-07 ENCOUNTER — OUTPATIENT (OUTPATIENT)
Dept: OUTPATIENT SERVICES | Facility: HOSPITAL | Age: 73
LOS: 1 days | End: 2023-04-07
Payer: COMMERCIAL

## 2023-04-07 ENCOUNTER — APPOINTMENT (OUTPATIENT)
Dept: MRI IMAGING | Facility: IMAGING CENTER | Age: 73
End: 2023-04-07
Payer: COMMERCIAL

## 2023-04-07 DIAGNOSIS — Z98.890 OTHER SPECIFIED POSTPROCEDURAL STATES: Chronic | ICD-10-CM

## 2023-04-07 DIAGNOSIS — G56.01 CARPAL TUNNEL SYNDROME, RIGHT UPPER LIMB: Chronic | ICD-10-CM

## 2023-04-07 DIAGNOSIS — C50.912 MALIGNANT NEOPLASM OF UNSPECIFIED SITE OF LEFT FEMALE BREAST: ICD-10-CM

## 2023-04-07 DIAGNOSIS — S98.139A COMPLETE TRAUMATIC AMPUTATION OF ONE UNSPECIFIED LESSER TOE, INITIAL ENCOUNTER: Chronic | ICD-10-CM

## 2023-04-07 DIAGNOSIS — Z95.5 PRESENCE OF CORONARY ANGIOPLASTY IMPLANT AND GRAFT: Chronic | ICD-10-CM

## 2023-04-07 DIAGNOSIS — Z00.8 ENCOUNTER FOR OTHER GENERAL EXAMINATION: ICD-10-CM

## 2023-04-07 PROCEDURE — 77049 MRI BREAST C-+ W/CAD BI: CPT | Mod: 26

## 2023-04-07 PROCEDURE — C8937: CPT

## 2023-04-07 PROCEDURE — C8908: CPT

## 2023-06-03 ENCOUNTER — APPOINTMENT (OUTPATIENT)
Dept: MRI IMAGING | Facility: CLINIC | Age: 73
End: 2023-06-03

## 2023-08-03 ENCOUNTER — OUTPATIENT (OUTPATIENT)
Dept: OUTPATIENT SERVICES | Facility: HOSPITAL | Age: 73
LOS: 1 days | End: 2023-08-03
Payer: COMMERCIAL

## 2023-08-03 VITALS
HEART RATE: 61 BPM | RESPIRATION RATE: 16 BRPM | HEIGHT: 67 IN | WEIGHT: 184.97 LBS | OXYGEN SATURATION: 97 % | TEMPERATURE: 98 F | SYSTOLIC BLOOD PRESSURE: 165 MMHG | DIASTOLIC BLOOD PRESSURE: 82 MMHG

## 2023-08-03 DIAGNOSIS — Z89.429 ACQUIRED ABSENCE OF OTHER TOE(S), UNSPECIFIED SIDE: Chronic | ICD-10-CM

## 2023-08-03 DIAGNOSIS — M24.572 CONTRACTURE, LEFT ANKLE: ICD-10-CM

## 2023-08-03 DIAGNOSIS — Z98.890 OTHER SPECIFIED POSTPROCEDURAL STATES: Chronic | ICD-10-CM

## 2023-08-03 DIAGNOSIS — M76.822 POSTERIOR TIBIAL TENDINITIS, LEFT LEG: ICD-10-CM

## 2023-08-03 DIAGNOSIS — I25.10 ATHEROSCLEROTIC HEART DISEASE OF NATIVE CORONARY ARTERY WITHOUT ANGINA PECTORIS: ICD-10-CM

## 2023-08-03 DIAGNOSIS — M13.872 OTHER SPECIFIED ARTHRITIS, LEFT ANKLE AND FOOT: ICD-10-CM

## 2023-08-03 DIAGNOSIS — F41.9 ANXIETY DISORDER, UNSPECIFIED: ICD-10-CM

## 2023-08-03 DIAGNOSIS — Z92.3 PERSONAL HISTORY OF IRRADIATION: Chronic | ICD-10-CM

## 2023-08-03 DIAGNOSIS — S98.139A COMPLETE TRAUMATIC AMPUTATION OF ONE UNSPECIFIED LESSER TOE, INITIAL ENCOUNTER: Chronic | ICD-10-CM

## 2023-08-03 DIAGNOSIS — I10 ESSENTIAL (PRIMARY) HYPERTENSION: ICD-10-CM

## 2023-08-03 DIAGNOSIS — Z95.5 PRESENCE OF CORONARY ANGIOPLASTY IMPLANT AND GRAFT: Chronic | ICD-10-CM

## 2023-08-03 DIAGNOSIS — M65.872 OTHER SYNOVITIS AND TENOSYNOVITIS, LEFT ANKLE AND FOOT: ICD-10-CM

## 2023-08-03 DIAGNOSIS — M24.875 OTHER SPECIFIC JOINT DERANGEMENTS LEFT FOOT, NOT ELSEWHERE CLASSIFIED: ICD-10-CM

## 2023-08-03 DIAGNOSIS — G56.01 CARPAL TUNNEL SYNDROME, RIGHT UPPER LIMB: Chronic | ICD-10-CM

## 2023-08-03 LAB
ANION GAP SERPL CALC-SCNC: 13 MMOL/L — SIGNIFICANT CHANGE UP (ref 7–14)
BUN SERPL-MCNC: 20 MG/DL — SIGNIFICANT CHANGE UP (ref 7–23)
CALCIUM SERPL-MCNC: 9.5 MG/DL — SIGNIFICANT CHANGE UP (ref 8.4–10.5)
CHLORIDE SERPL-SCNC: 98 MMOL/L — SIGNIFICANT CHANGE UP (ref 98–107)
CO2 SERPL-SCNC: 26 MMOL/L — SIGNIFICANT CHANGE UP (ref 22–31)
CREAT SERPL-MCNC: 0.75 MG/DL — SIGNIFICANT CHANGE UP (ref 0.5–1.3)
EGFR: 85 ML/MIN/1.73M2 — SIGNIFICANT CHANGE UP
GLUCOSE SERPL-MCNC: 100 MG/DL — HIGH (ref 70–99)
HCT VFR BLD CALC: 39.1 % — SIGNIFICANT CHANGE UP (ref 34.5–45)
HGB BLD-MCNC: 13.4 G/DL — SIGNIFICANT CHANGE UP (ref 11.5–15.5)
MCHC RBC-ENTMCNC: 34.3 GM/DL — SIGNIFICANT CHANGE UP (ref 32–36)
MCHC RBC-ENTMCNC: 34.4 PG — HIGH (ref 27–34)
MCV RBC AUTO: 100.5 FL — HIGH (ref 80–100)
NRBC # BLD: 0 /100 WBCS — SIGNIFICANT CHANGE UP (ref 0–0)
NRBC # FLD: 0 K/UL — SIGNIFICANT CHANGE UP (ref 0–0)
PLATELET # BLD AUTO: 282 K/UL — SIGNIFICANT CHANGE UP (ref 150–400)
POTASSIUM SERPL-MCNC: 3.9 MMOL/L — SIGNIFICANT CHANGE UP (ref 3.5–5.3)
POTASSIUM SERPL-SCNC: 3.9 MMOL/L — SIGNIFICANT CHANGE UP (ref 3.5–5.3)
RBC # BLD: 3.89 M/UL — SIGNIFICANT CHANGE UP (ref 3.8–5.2)
RBC # FLD: 12 % — SIGNIFICANT CHANGE UP (ref 10.3–14.5)
SODIUM SERPL-SCNC: 137 MMOL/L — SIGNIFICANT CHANGE UP (ref 135–145)
WBC # BLD: 7.64 K/UL — SIGNIFICANT CHANGE UP (ref 3.8–10.5)
WBC # FLD AUTO: 7.64 K/UL — SIGNIFICANT CHANGE UP (ref 3.8–10.5)

## 2023-08-03 PROCEDURE — 93010 ELECTROCARDIOGRAM REPORT: CPT

## 2023-08-03 NOTE — H&P PST ADULT - NSICDXFAMILYHX_GEN_ALL_CORE_FT
FAMILY HISTORY:  Sibling  Still living? Unknown  FH: breast cancer, Age at diagnosis: Age Unknown    Grandparent  Still living? Unknown  FH: breast cancer, Age at diagnosis: Age Unknown

## 2023-08-03 NOTE — H&P PST ADULT - NSICDXPASTMEDICALHX_GEN_ALL_CORE_FT
PAST MEDICAL HISTORY:  Anxiety and depression     CAD (coronary artery disease)     Contracture of left ankle     GERD (gastroesophageal reflux disease)     History of left breast cancer     HLD (hyperlipidemia)     HTN (hypertension)     Other specified arthritis of left ankle or foot     Other synovitis and tenosynovitis, left ankle and foot     Stented coronary artery

## 2023-08-03 NOTE — H&P PST ADULT - NSICDXPASTSURGICALHX_GEN_ALL_CORE_FT
PAST SURGICAL HISTORY:  H/O amputation of lesser toe     H/O colonoscopy     S/P cardiac cath     S/P endoscopy     S/P foot surgery, right     S/P lumpectomy, left breast     S/P radiation therapy

## 2023-08-03 NOTE — H&P PST ADULT - FUNCTIONAL STATUS
Followed protocol Activity: climbs stairs, walking, pushes heavy carts at work  Symptoms: None  Mets: 5

## 2023-08-03 NOTE — H&P PST ADULT - HEM GEN HX ROS MEA POS PC
[General Appearance - Well Developed] : well developed [Normal Appearance] : normal appearance [Well Groomed] : well groomed [General Appearance - Well Nourished] : well nourished [No Deformities] : no deformities [General Appearance - In No Acute Distress] : no acute distress [Eyelids - No Xanthelasma] : the eyelids demonstrated no xanthelasmas [Normal Conjunctiva] : the conjunctiva exhibited no abnormalities [Normal Oral Mucosa] : normal oral mucosa [No Oral Pallor] : no oral pallor [No Oral Cyanosis] : no oral cyanosis [Heart Rate And Rhythm] : heart rate and rhythm were normal [Heart Sounds] : normal S1 and S2 [Murmurs] : no murmurs present [Arterial Pulses Normal] : the arterial pulses were normal [Edema] : no peripheral edema present [Respiration, Rhythm And Depth] : normal respiratory rhythm and effort [Exaggerated Use Of Accessory Muscles For Inspiration] : no accessory muscle use [Abdomen Tenderness] : non-tender [Auscultation Breath Sounds / Voice Sounds] : lungs were clear to auscultation bilaterally [Abdomen Soft] : soft [Abdomen Mass (___ Cm)] : no abdominal mass palpated [Gait - Sufficient For Exercise Testing] : the gait was sufficient for exercise testing [Abnormal Walk] : normal gait [Cyanosis, Localized] : no localized cyanosis [Nail Clubbing] : no clubbing of the fingernails [] : no ischemic changes [Petechial Hemorrhages (___cm)] : no petechial hemorrhages [Oriented To Time, Place, And Person] : oriented to person, place, and time [Impaired Insight] : insight and judgment were intact [Affect] : the affect was normal [Mood] : the mood was normal [No Anxiety] : not feeling anxious [FreeTextEntry1] : no JVD or bruits  on Xarelto/bruises easily

## 2023-08-03 NOTE — H&P PST ADULT - MUSCULOSKELETAL
left ankle swelling/ROM intact/no calf tenderness/decreased ROM due to pain/joint swelling/strength 5/5 bilateral upper extremities/strength 5/5 bilateral lower extremities details…

## 2023-08-03 NOTE — H&P PST ADULT - HISTORY OF PRESENT ILLNESS
74 y/o female presents to PST preop for left gastrocnemius recession with triple arthrodesis. Pt reports left ankle pain that is getting progressively worse, radiates to her foot and causes difficulty walking.

## 2023-08-03 NOTE — H&P PST ADULT - CARDIOVASCULAR
Gunjan/zosyn  poditary consult   --TMA schedueled 9/21 9/22  Plan for wound closure on Monday  Podiatry  Plan for post acute care  Patient declining SNF placement  CM on consult    9/23  ABX  Wound Closure  ID on Consult  CM for Home needs     details… regular rate and rhythm/S1 S2 present/no murmur/no JVD/peripheral edema/pedal edema/vascular

## 2023-08-03 NOTE — H&P PST ADULT - ASSESSMENT
72 y/o female presents to PST preop for left gastrocnemius recession with triple arthrodesis. Pt reports left ankle pain that is getting progressively worse, radiates to her foot and causes difficulty walking.

## 2023-08-03 NOTE — H&P PST ADULT - PROBLEM SELECTOR PLAN 3
pt will remain on low dose Aspirin due to presence of coronary stents   she will get instructions from her cardiologist regarding last dose of Xarelto preop  cardiac evaluation requested for CAD with coronary stents x6 and uncontrolled /82  comparison EKG, stress test and ECHO requested with cardiac eval

## 2023-08-03 NOTE — H&P PST ADULT - NSANTHOSAYNRD_GEN_A_CORE
Detail Level: Simple Render Risk Assessment In Note?: no Comment: S/p previous LN2 treatment without improvement; discussed etiology and reviewed tx options including leaving alone to self resolve vs. cryotherapy vs. topical Efudex; pt opted to freeze today and use Efudex at home. Application instructions were reviewed. No. MATILDE screening performed.  STOP BANG Legend: 0-2 = LOW Risk; 3-4 = INTERMEDIATE Risk; 5-8 = HIGH Risk

## 2023-08-03 NOTE — H&P PST ADULT - PROBLEM SELECTOR PLAN 1
preop for left gastrocnemius recession with triple arthrodesis on 8/11/23  preop instructions given, pt verbalized understanding  Pt will take prescribed Prilosec AM of surgery for GI prophylaxis  chlorhexidine wash provided  cbc, bmp pending

## 2023-08-03 NOTE — H&P PST ADULT - NEGATIVE NEUROLOGICAL SYMPTOMS
no transient paralysis/no weakness/no paresthesias/no generalized seizures/no focal seizures/no syncope/no tremors/no vertigo/no loss of sensation/no loss of consciousness/no hemiparesis/no confusion/no facial palsy

## 2023-08-10 VITALS
WEIGHT: 184.97 LBS | RESPIRATION RATE: 18 BRPM | DIASTOLIC BLOOD PRESSURE: 61 MMHG | HEIGHT: 67 IN | TEMPERATURE: 97 F | HEART RATE: 61 BPM | SYSTOLIC BLOOD PRESSURE: 175 MMHG | OXYGEN SATURATION: 97 %

## 2023-08-10 NOTE — ASU PREOPERATIVE ASSESSMENT, ADULT ([facility] ONLY) - FALL HARM RISK - UNIVERSAL INTERVENTIONS
Bed in lowest position, wheels locked, appropriate side rails in place/Call bell, personal items and telephone in reach/Instruct patient to call for assistance before getting out of bed or chair/Non-slip footwear when patient is out of bed/[place] to call system/Physically safe environment - no spills, clutter or unnecessary equipment/Purposeful Proactive Rounding/Room/bathroom lighting operational, light cord in reach

## 2023-08-11 ENCOUNTER — RESULT REVIEW (OUTPATIENT)
Age: 73
End: 2023-08-11

## 2023-08-11 ENCOUNTER — OUTPATIENT (OUTPATIENT)
Dept: OUTPATIENT SERVICES | Facility: HOSPITAL | Age: 73
LOS: 1 days | Discharge: HOME | End: 2023-08-11
Payer: COMMERCIAL

## 2023-08-11 ENCOUNTER — TRANSCRIPTION ENCOUNTER (OUTPATIENT)
Age: 73
End: 2023-08-11

## 2023-08-11 VITALS
OXYGEN SATURATION: 99 % | TEMPERATURE: 98 F | RESPIRATION RATE: 18 BRPM | DIASTOLIC BLOOD PRESSURE: 64 MMHG | SYSTOLIC BLOOD PRESSURE: 132 MMHG | HEART RATE: 60 BPM

## 2023-08-11 DIAGNOSIS — Z98.890 OTHER SPECIFIED POSTPROCEDURAL STATES: Chronic | ICD-10-CM

## 2023-08-11 DIAGNOSIS — Z95.5 PRESENCE OF CORONARY ANGIOPLASTY IMPLANT AND GRAFT: Chronic | ICD-10-CM

## 2023-08-11 DIAGNOSIS — M13.872 OTHER SPECIFIED ARTHRITIS, LEFT ANKLE AND FOOT: ICD-10-CM

## 2023-08-11 DIAGNOSIS — S98.139A COMPLETE TRAUMATIC AMPUTATION OF ONE UNSPECIFIED LESSER TOE, INITIAL ENCOUNTER: Chronic | ICD-10-CM

## 2023-08-11 DIAGNOSIS — Z89.429 ACQUIRED ABSENCE OF OTHER TOE(S), UNSPECIFIED SIDE: Chronic | ICD-10-CM

## 2023-08-11 DIAGNOSIS — G56.01 CARPAL TUNNEL SYNDROME, RIGHT UPPER LIMB: Chronic | ICD-10-CM

## 2023-08-11 DIAGNOSIS — Z92.3 PERSONAL HISTORY OF IRRADIATION: Chronic | ICD-10-CM

## 2023-08-11 PROCEDURE — 73650 X-RAY EXAM OF HEEL: CPT | Mod: 26,LT,59

## 2023-08-11 PROCEDURE — 73630 X-RAY EXAM OF FOOT: CPT | Mod: 26,LT

## 2023-08-11 DEVICE — IMP EASYFUSE MID/HINDFOOT FIXATION: Type: IMPLANTABLE DEVICE | Site: LEFT | Status: FUNCTIONAL

## 2023-08-11 DEVICE — GUIDEWIRE 3.2X230MM: Type: IMPLANTABLE DEVICE | Site: LEFT | Status: FUNCTIONAL

## 2023-08-11 DEVICE — IMPLANTABLE DEVICE: Type: IMPLANTABLE DEVICE | Site: LEFT | Status: FUNCTIONAL

## 2023-08-11 DEVICE — GUIDEWIRE UNTHREADED 1.4X150MM: Type: IMPLANTABLE DEVICE | Site: LEFT | Status: FUNCTIONAL

## 2023-08-11 DEVICE — IMP VITOSS BB TRUMA FOAM PACK 1.2CC: Type: IMPLANTABLE DEVICE | Site: LEFT | Status: FUNCTIONAL

## 2023-08-11 DEVICE — STAPLE FUSEFORCE 20X20MM: Type: IMPLANTABLE DEVICE | Site: LEFT | Status: FUNCTIONAL

## 2023-08-11 DEVICE — PIN STEINMAN SMTH 2.5X100MM: Type: IMPLANTABLE DEVICE | Site: LEFT | Status: FUNCTIONAL

## 2023-08-11 DEVICE — GRAFT BONE AUGMENT INJ 3ML SYNTHETIC: Type: IMPLANTABLE DEVICE | Site: LEFT | Status: FUNCTIONAL

## 2023-08-11 NOTE — ASU DISCHARGE PLAN (ADULT/PEDIATRIC) - ASU DC SPECIAL INSTRUCTIONSFT
You just had L foot surgery. Please keep your dressings/cast clean dry and intact. Do not bear weight to the Left foot. Please use your rolling walker as instructed.

## 2023-08-11 NOTE — ASU DISCHARGE PLAN (ADULT/PEDIATRIC) - PATIENT EDUCATION MATERIALS PROVIED
Provider pre-printed instructions given info sheet given/Provider pre-printed instructions given/Pre-printed instructions given for other (specify)

## 2023-08-11 NOTE — PACU DISCHARGE NOTE - NSCLINEINSERTRD_GEN_ALL_CORE
Patient Name: Devika Jones  : 1953    MRN: [id number]                              Today's Date: 2021       Admit Date: 11/10/2021    Visit Dx:     ICD-10-CM ICD-9-CM   1. Primary osteoarthritis of right hip  M16.11 715.15     Patient Active Problem List   Diagnosis   • Osteoarthritis of right hip   • Anxiety disorder   • Attention deficit hyperactivity disorder, predominantly inattentive type   • Chronic back pain   • Depressive disorder   • Gastroesophageal reflux disease   • Mixed hyperlipidemia   • Hypothyroidism   • Irritable bowel syndrome   • Osteoporosis   • Neck pain   • Insomnia   • Overweight (BMI 25.0-29.9)     Past Medical History:   Diagnosis Date   • ADD (attention deficit disorder)    • Allergies    • Arthritis    • Samuel's esophagus    • Cellulitis     post hysterectomy   • Cervical stenosis of spine     congenital   • Delayed emergence from anesthesia    • Depression    • Diverticular disease mild   • Frequent headaches    • GERD (gastroesophageal reflux disease)    • Hx of migraines    • Hypothyroid    • Insomnia    • Osteopenia    • Peripheral edema    • PONV (postoperative nausea and vomiting)    • Sleep apnea     cpap not using due to recall     Past Surgical History:   Procedure Laterality Date   • COLONOSCOPY      multiple   • ENDOSCOPY      dilatation yearly   • HYSTERECTOMY      cellulitis   • INCISION AND DRAINAGE BREAST ABSCESS Left    • LUMBAR DISCECTOMY      L4   • TONSILLECTOMY  1968   • TOTAL HIP ARTHROPLASTY Right 11/10/2021    Procedure: TOTAL HIP ARTHROPLASTY ANTERIOR;  Surgeon: Maximus Red MD;  Location: [facility] MAIN OR;  Service: Orthopedics;  Laterality: Right;   • VAGINAL DELIVERY      post delivery bleeding      General Information     Row Name 21 1622 21 1340       OT Time and Intention    Document Type evaluation  -MP evaluation  -MP    Mode of Treatment occupational therapy; individual therapy  -MP occupational therapy;  individual therapy  -MP    Row Name 11/11/21 1622 11/11/21 1340       General Information    Patient Profile Reviewed yes  -MP yes  -MP    Prior Level of Function independent:; ADL's  -MP independent:; ADL's  -MP    Row Name 11/11/21 1622 11/11/21 1340       Living Environment    Lives With spouse  -MP spouse  -MP    Row Name 11/11/21 1340          Home Main Entrance    Number of Stairs, Main Entrance four  -MP     Row Name 11/11/21 1622 11/11/21 1340       Cognition    Orientation Status (Cognition) oriented x 4  -MP oriented x 4  -MP    Row Name 11/11/21 1622 11/11/21 1340       Safety Issues, Functional Mobility    Impairments Affecting Function (Mobility) sensation/sensory awareness; endurance/activity tolerance; pain; balance; strength  -MP sensation/sensory awareness; endurance/activity tolerance; pain; balance; strength  -MP          User Key  (r) = Recorded By, (t) = Taken By, (c) = Cosigned By    Initials Name Provider Type    MP Juan Jose Simms OT Occupational Therapist                 Mobility/ADL's     Row Name 11/11/21 1623 11/11/21 1341       Bed Mobility    Bed Mobility supine-sit  -MP supine-sit  -MP    Supine-Sit [place] (Bed Mobility) minimum assist (75% patient effort)  -MP minimum assist (75% patient effort)  -MP    Row Name 11/11/21 1623          Transfers    Transfers sit-stand transfer  -MP     Bed-Chair [place] (Transfers) minimum assist (75% patient effort)  -MP     Assistive Device (Bed-Chair Transfers) walker, front-wheeled  -MP     Sit-Stand [place] (Transfers) contact guard  -     Row Name 11/11/21 1623          Sit-Stand Transfer    Assistive Device (Sit-Stand Transfers) walker, front-wheeled  -MP     Row Name 11/11/21 1623          Activities of Daily Living    BADL Assessment/Intervention lower body dressing  -     Row Name 11/11/21 1623 11/11/21 1341       Mobility    Extremity Weight-bearing Status right lower extremity  -MP right lower extremity  -MP    Right  Lower Extremity (Weight-bearing Status) weight-bearing as tolerated (WBAT)  -MP weight-bearing as tolerated (WBAT)  -MP    Row Name 11/11/21 1623          Lower Body Dressing Assessment/Training    Story Level (Lower Body Dressing) don; doff; socks; moderate assist (50% patient effort)  -MP           User Key  (r) = Recorded By, (t) = Taken By, (c) = Cosigned By    Initials Name Provider Type    Juan Jose Chase OT Occupational Therapist               Obj/Interventions     Row Name 11/11/21 1627          Range of Motion Comprehensive    Comment, General Range of Motion BUE WFL  -MP     Row Name 11/11/21 1627          Strength Comprehensive (MMT)    Comment, General Manual Muscle Testing (MMT) Assessment [facility] WFL  -MP     [facility] Name 11/11/21 1627          Balance    Balance Interventions sitting; standing; sit to stand; supported; static; dynamic  -MP           User Key  (r) = Recorded By, (t) = Taken By, (c) = Cosigned By    Initials Name Provider Type    Juan Jose Chase OT Occupational Therapist               Goals/Plan     Row Name 11/11/21 1636          Bed Mobility Goal 1 (OT)    Activity/Assistive Device (Bed Mobility Goal 1, OT) bed mobility activities, all  -MP     Story Level/Cues Needed (Bed Mobility Goal 1, OT) contact guard assist  -MP     Time Frame (Bed Mobility Goal 1, OT) long term goal (LTG); 2 weeks  -MP     [facility] Name 11/11/21 1636          Transfer Goal 1 (OT)    Activity/Assistive Device (Transfer Goal 1, OT) sit-to-stand/stand-to-sit; toilet  -MP     Story Level/Cues Needed (Transfer Goal 1, OT) contact guard assist  -MP     Time Frame (Transfer Goal 1, OT) long term goal (LTG); 2 weeks  -MP     [facility] Name 11/11/21 1636          Dressing Goal 1 (OT)    Activity/Device (Dressing Goal 1, OT) lower body dressing  -MP     Story/Cues Needed (Dressing Goal 1, OT) contact guard assist  -MP     Time Frame (Dressing Goal 1, OT) long term goal (LTG); 2 weeks  -MP         No    User Key  (r) = Recorded By, (t) = Taken By, (c) = Cosigned By    Initials Name Provider Type    MP Juan Jose Simms, CLIF Occupational Therapist               Clinical Impression     Row Name 11/11/21 1629          Pain Scale: Numbers Pre/Post-Treatment    Pretreatment Pain Rating 7/10  -MP     Posttreatment Pain Rating 6/10  -MP     Pain Location - Side Right  -MP     Pain Location - Orientation lower  -MP     Pain Location extremity  -MP     Row Name 11/11/21 1629          Plan of Care Review    Plan of Care Reviewed With patient  -MP     Progress no change  -MP     Outcome Summary Pt. is 67 y/o female admit s/o R ELIZABETH. Pt. lives at home w/ spouse whom requires physical assistance from patient for sit to stand transfers. Pt. reports increased pain this PM, completes SPS transfer EOB to armchair w/ min A for safety + rolling walker support. Pt. BP stable throughout w/o orthostatic hypotension. Pt. requires mod-max A for LB ADLs secondary to increased pain w/ decreased hip flexion. Recommend IP rehab at d/c to address aforementioned deficits, will follow up w/ pt. 1-3x per week at [facility].  -MP     Row Name 11/11/21 1629          Therapy Assessment/Plan (OT)    Rehab Potential (OT) good, to achieve stated therapy goals  -MP     Criteria for Skilled Therapeutic Interventions Met (OT) yes; skilled treatment is necessary  -MP     Therapy Frequency (OT) 3 times/wk  -MP     Row Name 11/11/21 1629          Therapy Plan Review/Discharge Plan (OT)    Anticipated Discharge Disposition (OT) inpatient rehabilitation facility  -MP     Row Name 11/11/21 1629          Vital Signs    Pre Patient Position Supine  -MP     Intra Patient Position Standing  -MP     Post Patient Position Sitting  -MP     Row Name 11/11/21 1629          Positioning and Restraints    Pre-Treatment Position in bed  -MP     Post Treatment Position chair  -MP     In Chair sitting; call light within reach; encouraged to call for assist; exit alarm on  -MP            User Key  (r) = Recorded By, (t) = Taken By, (c) = Cosigned By    Initials Name Provider Type    Juan Jose Chase OT Occupational Therapist               Outcome Measures    No documentation.                 Occupational Therapy Education                 Title: PT OT SLP Therapies (In Progress)     Topic: Occupational Therapy (In Progress)     Point: ADL training (Not Started)     Description:   Instruct learner(s) on proper safety adaptation and remediation techniques during self care or transfers.   Instruct in proper use of assistive devices.              Learner Progress:  Not documented in this visit.          Point: Home exercise program (Not Started)     Description:   Instruct learner(s) on appropriate technique for monitoring, assisting and/or progressing therapeutic exercises/activities.              Learner Progress:  Not documented in this visit.          Point: Precautions (Not Started)     Description:   Instruct learner(s) on prescribed precautions during self-care and functional transfers.              Learner Progress:  Not documented in this visit.          Point: Body mechanics (Done)     Description:   Instruct learner(s) on proper positioning and spine alignment during self-care, functional mobility activities and/or exercises.              Learning Progress Summary           Patient Acceptance, E,TB, VU by BRANDAN at 11/11/2021 1637                               User Key     Initials Effective Dates Name Provider Type Discipline     06/16/21 -  Juan Jose Simms OT Occupational Therapist OT              OT Recommendation and Plan  Therapy Frequency (OT): 3 times/wk  Plan of Care Review  Plan of Care Reviewed With: patient  Progress: no change  Outcome Summary: Pt. is 69 y/o female admit s/o R ELIZABETH. Pt. lives at home w/ spouse whom requires physical assistance from patient for sit to stand transfers. Pt. reports increased pain this PM, completes SPS transfer EOB to armchair w/ min A  for safety + rolling walker support. Pt. BP stable throughout w/o orthostatic hypotension. Pt. requires mod-max A for LB ADLs secondary to increased pain w/ decreased hip flexion. Recommend IP rehab at d/c to address aforementioned deficits, will follow up w/ pt. 1-3x per week at [facility].     Time Calculation:    Time Calculation- OT     Row Name 11/11/21 1637             Time Calculation- OT    OT Start Time 1338  -MP      OT Stop Time 1402  -MP      OT Time Calculation (min) 24 min  -MP      Total Timed Code Minutes- OT 8 minute(s)  -MP      OT Received On 11/11/21  -      OT - Next Appointment 11/15/21  -      OT Goal Re-Cert Due Date 11/25/21  -            User Key  (r) = Recorded By, (t) = Taken By, (c) = Cosigned By    Initials Name Provider Type    MP Juan Jose Simms OT Occupational Therapist              Therapy Charges for Today     Code Description Service Date Service Provider Modifiers Qty    [id number]  OT EVAL LOW COMPLEXITY 3 11/11/2021 Juan Jose Simms OT GO 1    [id number]  OT THERAPEUTIC ACT EA 15 MIN 11/11/2021 Juan Jose Simms OT GO 1               Juan Jose Simms OT  11/11/2021

## 2023-08-11 NOTE — ASU DISCHARGE PLAN (ADULT/PEDIATRIC) - NS MD DC FALL RISK RISK
For information on Fall & Injury Prevention, visit: [email or web address] OR  [email or web address] OR  https://www.cdc.gov/steadi/patient.html

## 2023-08-11 NOTE — ASU DISCHARGE PLAN (ADULT/PEDIATRIC) - NURSING INSTRUCTIONS
DO NOT take any Tylenol (Acetaminophen) or narcotics containing Tylenol until after 06:45pm. You received Tylenol during your operation and it can cause damage to your liver if too much is taken within a 24 hour time period.

## 2023-08-11 NOTE — ASU DISCHARGE PLAN (ADULT/PEDIATRIC) - CARE PROVIDER_API CALL
Anisa Olivares  Podiatric Medicine  [address], [address]  [place] [address]  Phone: [phone]  Fax: [phone]  Established Patient  Follow Up Time: 1 week

## 2023-08-14 RX ORDER — RIVAROXABAN 15 MG-20MG
1 KIT ORAL
Refills: 0 | DISCHARGE

## 2023-08-14 RX ORDER — CITALOPRAM 10 MG/1
1 TABLET, FILM COATED ORAL
Refills: 0 | DISCHARGE

## 2023-08-22 NOTE — ED PROVIDER NOTE - TOBACCO USE
Congratulations!  Your Pediatrician is excited about your future. To assist in your transition to a new Primary Care Provider, we are providing you with this list of [facility] accepting new patients. Please call to schedule a new patient appointment with a provider of your choosing.  [facility] Locations  [facility]  [address] [place] [address]  [phone]    (Family Medicine)     MD Carlos Kim MD    (Internal  Medicine)     Raquel Montes MD    [facility]  [address] [place] [address]  [phone]    (Family Medicine)     MD Anson Royal NP    [address]  [place] [id number] [phone]     (Family Medicine)       [facility]  [address] [place] [address] Dr. Chary Colón, [place] [address]  [phone]    (Family Medicine)     DO Fidelia Salmon PA           [place] [id number]  [phone]    (Family Medicine)     MD Jose Armando Bello MD Sarah Jabs, PA Juliane (Berenice) APPLE Sheppard NP       (Internal Medicine)     Lisa Kumar DO      [address] [id number] [phone]    (Family Medicine)     MD Cayla Horowitz NP    [address] [address]  [phone]    (Family Medicine)    [facility]  [address] [place] [address]  [phone]    (Family Medicine)     Ramona Jasso NP     Thank you for choosing [facility] for all of your Health Care needs.         Current every day smoker

## 2023-10-26 NOTE — H&P PST ADULT - GAIT/BALANCE
Health Maintenance Due   Topic Date Due   • COVID-19 Vaccine (1) Never done   • DM/CKD Microalbumin  07/18/2023   • Influenza Vaccine (1) 09/01/2023       Patient is due for topics as listed above but is not proceeding with Immunization(s) COVID-19 and Influenza and DM/CKD Microalbumin at this time. Education provided for Immunization(s) COVID-19 and Influenza and DM/CKD Microalbumin.  Recent PHQ 2/9 Score    PHQ 2:  PHQ 2 Score Adult PHQ 2 Score Adult PHQ 2 Interpretation Little interest or pleasure in activity?   10/26/2023   8:27 AM 4 Further screening needed 2       PHQ 9:  PHQ 9 Score Adult PHQ 9 Score Adult PHQ 9 Interpretation   10/26/2023   8:27 AM 10 Moderate Depression     PHQ-2/9 Depression Screening  Little interest or pleasure in activity?: More than half the days  Feeling down, depressed or hopeless?: More than half the days  Initial depression screening score:: 4  PHQ2 Interpretation: Further screening needed  Trouble falling or staying asleep or sleeping all the time?: Nearly every day  Feeling tired or having little energy?: More than half the days  Poor appetite or overeating?: Several days  Feeling bad about yourself or that you are a failure or have let yourself or family down?: Not at all  Trouble concentrating on things such as reading the newspaper or watching TV?: Not at all  Moving or speaking slowly that other people have noticed or the opposite - being so fidgety or restless that you have been moving around a lot more than usual?: Not at all  Thoughts that you would be better off dead or of hurting yourself in some way?: Not at all  Total depressive symptoms score (PHQ9): : 10  PHQ9 Interpretation: Moderate Depression  If you reported any problems, how difficult have these problems made it to do your work, take care of things at home, or get along with other people?: Somewhat difficult   Kayleigh Willett LPN     steady

## 2024-01-12 ENCOUNTER — INPATIENT (INPATIENT)
Facility: HOSPITAL | Age: 74
LOS: 3 days | Discharge: HOME | End: 2024-01-16
Attending: INTERNAL MEDICINE | Admitting: INTERNAL MEDICINE
Payer: COMMERCIAL

## 2024-01-12 VITALS
TEMPERATURE: 98 F | SYSTOLIC BLOOD PRESSURE: 140 MMHG | OXYGEN SATURATION: 98 % | HEART RATE: 58 BPM | RESPIRATION RATE: 18 BRPM | DIASTOLIC BLOOD PRESSURE: 73 MMHG

## 2024-01-12 DIAGNOSIS — Z98.890 OTHER SPECIFIED POSTPROCEDURAL STATES: Chronic | ICD-10-CM

## 2024-01-12 DIAGNOSIS — Z89.429 ACQUIRED ABSENCE OF OTHER TOE(S), UNSPECIFIED SIDE: Chronic | ICD-10-CM

## 2024-01-12 DIAGNOSIS — R07.89 OTHER CHEST PAIN: ICD-10-CM

## 2024-01-12 DIAGNOSIS — R79.89 OTHER SPECIFIED ABNORMAL FINDINGS OF BLOOD CHEMISTRY: ICD-10-CM

## 2024-01-12 DIAGNOSIS — E78.5 HYPERLIPIDEMIA, UNSPECIFIED: ICD-10-CM

## 2024-01-12 DIAGNOSIS — I10 ESSENTIAL (PRIMARY) HYPERTENSION: ICD-10-CM

## 2024-01-12 DIAGNOSIS — I25.10 ATHEROSCLEROTIC HEART DISEASE OF NATIVE CORONARY ARTERY WITHOUT ANGINA PECTORIS: ICD-10-CM

## 2024-01-12 DIAGNOSIS — G56.01 CARPAL TUNNEL SYNDROME, RIGHT UPPER LIMB: Chronic | ICD-10-CM

## 2024-01-12 DIAGNOSIS — Z95.5 PRESENCE OF CORONARY ANGIOPLASTY IMPLANT AND GRAFT: Chronic | ICD-10-CM

## 2024-01-12 DIAGNOSIS — Z92.3 PERSONAL HISTORY OF IRRADIATION: Chronic | ICD-10-CM

## 2024-01-12 DIAGNOSIS — K83.8 OTHER SPECIFIED DISEASES OF BILIARY TRACT: ICD-10-CM

## 2024-01-12 DIAGNOSIS — R42 DIZZINESS AND GIDDINESS: ICD-10-CM

## 2024-01-12 DIAGNOSIS — S98.139A COMPLETE TRAUMATIC AMPUTATION OF ONE UNSPECIFIED LESSER TOE, INITIAL ENCOUNTER: Chronic | ICD-10-CM

## 2024-01-12 PROBLEM — F41.9 ANXIETY DISORDER, UNSPECIFIED: Chronic | Status: ACTIVE | Noted: 2023-08-03

## 2024-01-12 PROBLEM — K21.9 GASTRO-ESOPHAGEAL REFLUX DISEASE WITHOUT ESOPHAGITIS: Chronic | Status: ACTIVE | Noted: 2023-08-03

## 2024-01-12 PROBLEM — M65.872 OTHER SYNOVITIS AND TENOSYNOVITIS, LEFT ANKLE AND FOOT: Chronic | Status: ACTIVE | Noted: 2023-08-03

## 2024-01-12 PROBLEM — M24.572 CONTRACTURE, LEFT ANKLE: Chronic | Status: ACTIVE | Noted: 2023-08-03

## 2024-01-12 PROBLEM — Z85.3 PERSONAL HISTORY OF MALIGNANT NEOPLASM OF BREAST: Chronic | Status: ACTIVE | Noted: 2023-08-03

## 2024-01-12 PROBLEM — M13.872 OTHER SPECIFIED ARTHRITIS, LEFT ANKLE AND FOOT: Chronic | Status: ACTIVE | Noted: 2023-08-03

## 2024-01-12 LAB
ALBUMIN SERPL ELPH-MCNC: 3.8 G/DL — SIGNIFICANT CHANGE UP (ref 3.3–5)
ALBUMIN SERPL ELPH-MCNC: 3.8 G/DL — SIGNIFICANT CHANGE UP (ref 3.3–5)
ALP SERPL-CCNC: 116 U/L — SIGNIFICANT CHANGE UP (ref 40–120)
ALP SERPL-CCNC: 116 U/L — SIGNIFICANT CHANGE UP (ref 40–120)
ALT FLD-CCNC: 17 U/L — SIGNIFICANT CHANGE UP (ref 4–33)
ALT FLD-CCNC: 17 U/L — SIGNIFICANT CHANGE UP (ref 4–33)
ANION GAP SERPL CALC-SCNC: 14 MMOL/L — SIGNIFICANT CHANGE UP (ref 7–14)
ANION GAP SERPL CALC-SCNC: 14 MMOL/L — SIGNIFICANT CHANGE UP (ref 7–14)
AST SERPL-CCNC: 23 U/L — SIGNIFICANT CHANGE UP (ref 4–32)
AST SERPL-CCNC: 23 U/L — SIGNIFICANT CHANGE UP (ref 4–32)
BASOPHILS # BLD AUTO: 0.03 K/UL — SIGNIFICANT CHANGE UP (ref 0–0.2)
BASOPHILS # BLD AUTO: 0.03 K/UL — SIGNIFICANT CHANGE UP (ref 0–0.2)
BASOPHILS NFR BLD AUTO: 0.3 % — SIGNIFICANT CHANGE UP (ref 0–2)
BASOPHILS NFR BLD AUTO: 0.3 % — SIGNIFICANT CHANGE UP (ref 0–2)
BILIRUB SERPL-MCNC: 0.6 MG/DL — SIGNIFICANT CHANGE UP (ref 0.2–1.2)
BILIRUB SERPL-MCNC: 0.6 MG/DL — SIGNIFICANT CHANGE UP (ref 0.2–1.2)
BUN SERPL-MCNC: 18 MG/DL — SIGNIFICANT CHANGE UP (ref 7–23)
BUN SERPL-MCNC: 18 MG/DL — SIGNIFICANT CHANGE UP (ref 7–23)
CALCIUM SERPL-MCNC: 9.1 MG/DL — SIGNIFICANT CHANGE UP (ref 8.4–10.5)
CALCIUM SERPL-MCNC: 9.1 MG/DL — SIGNIFICANT CHANGE UP (ref 8.4–10.5)
CHLORIDE SERPL-SCNC: 100 MMOL/L — SIGNIFICANT CHANGE UP (ref 98–107)
CHLORIDE SERPL-SCNC: 100 MMOL/L — SIGNIFICANT CHANGE UP (ref 98–107)
CK MB BLD-MCNC: 2.9 % — HIGH (ref 0–2.5)
CK MB BLD-MCNC: 2.9 % — HIGH (ref 0–2.5)
CK MB CFR SERPL CALC: 2.6 NG/ML — SIGNIFICANT CHANGE UP
CK MB CFR SERPL CALC: 2.6 NG/ML — SIGNIFICANT CHANGE UP
CK SERPL-CCNC: 90 U/L — SIGNIFICANT CHANGE UP (ref 25–170)
CK SERPL-CCNC: 90 U/L — SIGNIFICANT CHANGE UP (ref 25–170)
CO2 SERPL-SCNC: 26 MMOL/L — SIGNIFICANT CHANGE UP (ref 22–31)
CO2 SERPL-SCNC: 26 MMOL/L — SIGNIFICANT CHANGE UP (ref 22–31)
CREAT SERPL-MCNC: 0.75 MG/DL — SIGNIFICANT CHANGE UP (ref 0.5–1.3)
CREAT SERPL-MCNC: 0.75 MG/DL — SIGNIFICANT CHANGE UP (ref 0.5–1.3)
EGFR: 85 ML/MIN/1.73M2 — SIGNIFICANT CHANGE UP
EGFR: 85 ML/MIN/1.73M2 — SIGNIFICANT CHANGE UP
EOSINOPHIL # BLD AUTO: 0.12 K/UL — SIGNIFICANT CHANGE UP (ref 0–0.5)
EOSINOPHIL # BLD AUTO: 0.12 K/UL — SIGNIFICANT CHANGE UP (ref 0–0.5)
EOSINOPHIL NFR BLD AUTO: 1.2 % — SIGNIFICANT CHANGE UP (ref 0–6)
EOSINOPHIL NFR BLD AUTO: 1.2 % — SIGNIFICANT CHANGE UP (ref 0–6)
GLUCOSE SERPL-MCNC: 109 MG/DL — HIGH (ref 70–99)
GLUCOSE SERPL-MCNC: 109 MG/DL — HIGH (ref 70–99)
HCT VFR BLD CALC: 35.3 % — SIGNIFICANT CHANGE UP (ref 34.5–45)
HCT VFR BLD CALC: 35.3 % — SIGNIFICANT CHANGE UP (ref 34.5–45)
HGB BLD-MCNC: 11.6 G/DL — SIGNIFICANT CHANGE UP (ref 11.5–15.5)
HGB BLD-MCNC: 11.6 G/DL — SIGNIFICANT CHANGE UP (ref 11.5–15.5)
IANC: 7.11 K/UL — SIGNIFICANT CHANGE UP (ref 1.8–7.4)
IANC: 7.11 K/UL — SIGNIFICANT CHANGE UP (ref 1.8–7.4)
IMM GRANULOCYTES NFR BLD AUTO: 0.4 % — SIGNIFICANT CHANGE UP (ref 0–0.9)
IMM GRANULOCYTES NFR BLD AUTO: 0.4 % — SIGNIFICANT CHANGE UP (ref 0–0.9)
INR BLD: 1.25 RATIO — HIGH (ref 0.85–1.18)
INR BLD: 1.25 RATIO — HIGH (ref 0.85–1.18)
LIDOCAIN IGE QN: 13 U/L — SIGNIFICANT CHANGE UP (ref 7–60)
LIDOCAIN IGE QN: 13 U/L — SIGNIFICANT CHANGE UP (ref 7–60)
LYMPHOCYTES # BLD AUTO: 1.93 K/UL — SIGNIFICANT CHANGE UP (ref 1–3.3)
LYMPHOCYTES # BLD AUTO: 1.93 K/UL — SIGNIFICANT CHANGE UP (ref 1–3.3)
LYMPHOCYTES # BLD AUTO: 19 % — SIGNIFICANT CHANGE UP (ref 13–44)
LYMPHOCYTES # BLD AUTO: 19 % — SIGNIFICANT CHANGE UP (ref 13–44)
MCHC RBC-ENTMCNC: 32.2 PG — SIGNIFICANT CHANGE UP (ref 27–34)
MCHC RBC-ENTMCNC: 32.2 PG — SIGNIFICANT CHANGE UP (ref 27–34)
MCHC RBC-ENTMCNC: 32.9 GM/DL — SIGNIFICANT CHANGE UP (ref 32–36)
MCHC RBC-ENTMCNC: 32.9 GM/DL — SIGNIFICANT CHANGE UP (ref 32–36)
MCV RBC AUTO: 98.1 FL — SIGNIFICANT CHANGE UP (ref 80–100)
MCV RBC AUTO: 98.1 FL — SIGNIFICANT CHANGE UP (ref 80–100)
MONOCYTES # BLD AUTO: 0.92 K/UL — HIGH (ref 0–0.9)
MONOCYTES # BLD AUTO: 0.92 K/UL — HIGH (ref 0–0.9)
MONOCYTES NFR BLD AUTO: 9.1 % — SIGNIFICANT CHANGE UP (ref 2–14)
MONOCYTES NFR BLD AUTO: 9.1 % — SIGNIFICANT CHANGE UP (ref 2–14)
NEUTROPHILS # BLD AUTO: 7.11 K/UL — SIGNIFICANT CHANGE UP (ref 1.8–7.4)
NEUTROPHILS # BLD AUTO: 7.11 K/UL — SIGNIFICANT CHANGE UP (ref 1.8–7.4)
NEUTROPHILS NFR BLD AUTO: 70 % — SIGNIFICANT CHANGE UP (ref 43–77)
NEUTROPHILS NFR BLD AUTO: 70 % — SIGNIFICANT CHANGE UP (ref 43–77)
NRBC # BLD: 0 /100 WBCS — SIGNIFICANT CHANGE UP (ref 0–0)
NRBC # BLD: 0 /100 WBCS — SIGNIFICANT CHANGE UP (ref 0–0)
NRBC # FLD: 0 K/UL — SIGNIFICANT CHANGE UP (ref 0–0)
NRBC # FLD: 0 K/UL — SIGNIFICANT CHANGE UP (ref 0–0)
PLATELET # BLD AUTO: 306 K/UL — SIGNIFICANT CHANGE UP (ref 150–400)
PLATELET # BLD AUTO: 306 K/UL — SIGNIFICANT CHANGE UP (ref 150–400)
POTASSIUM SERPL-MCNC: 3.6 MMOL/L — SIGNIFICANT CHANGE UP (ref 3.5–5.3)
POTASSIUM SERPL-MCNC: 3.6 MMOL/L — SIGNIFICANT CHANGE UP (ref 3.5–5.3)
POTASSIUM SERPL-SCNC: 3.6 MMOL/L — SIGNIFICANT CHANGE UP (ref 3.5–5.3)
POTASSIUM SERPL-SCNC: 3.6 MMOL/L — SIGNIFICANT CHANGE UP (ref 3.5–5.3)
PROT SERPL-MCNC: 7.4 G/DL — SIGNIFICANT CHANGE UP (ref 6–8.3)
PROT SERPL-MCNC: 7.4 G/DL — SIGNIFICANT CHANGE UP (ref 6–8.3)
PROTHROM AB SERPL-ACNC: 13.9 SEC — HIGH (ref 9.5–13)
PROTHROM AB SERPL-ACNC: 13.9 SEC — HIGH (ref 9.5–13)
RBC # BLD: 3.6 M/UL — LOW (ref 3.8–5.2)
RBC # BLD: 3.6 M/UL — LOW (ref 3.8–5.2)
RBC # FLD: 13.8 % — SIGNIFICANT CHANGE UP (ref 10.3–14.5)
RBC # FLD: 13.8 % — SIGNIFICANT CHANGE UP (ref 10.3–14.5)
SODIUM SERPL-SCNC: 140 MMOL/L — SIGNIFICANT CHANGE UP (ref 135–145)
SODIUM SERPL-SCNC: 140 MMOL/L — SIGNIFICANT CHANGE UP (ref 135–145)
TROPONIN T, HIGH SENSITIVITY RESULT: 61 NG/L — CRITICAL HIGH
TROPONIN T, HIGH SENSITIVITY RESULT: 61 NG/L — CRITICAL HIGH
TROPONIN T, HIGH SENSITIVITY RESULT: 65 NG/L — CRITICAL HIGH
TROPONIN T, HIGH SENSITIVITY RESULT: 65 NG/L — CRITICAL HIGH
WBC # BLD: 10.15 K/UL — SIGNIFICANT CHANGE UP (ref 3.8–10.5)
WBC # BLD: 10.15 K/UL — SIGNIFICANT CHANGE UP (ref 3.8–10.5)
WBC # FLD AUTO: 10.15 K/UL — SIGNIFICANT CHANGE UP (ref 3.8–10.5)
WBC # FLD AUTO: 10.15 K/UL — SIGNIFICANT CHANGE UP (ref 3.8–10.5)

## 2024-01-12 PROCEDURE — 76700 US EXAM ABDOM COMPLETE: CPT | Mod: 26

## 2024-01-12 PROCEDURE — 73502 X-RAY EXAM HIP UNI 2-3 VIEWS: CPT | Mod: 26,RT

## 2024-01-12 PROCEDURE — 71046 X-RAY EXAM CHEST 2 VIEWS: CPT | Mod: 26

## 2024-01-12 PROCEDURE — 99285 EMERGENCY DEPT VISIT HI MDM: CPT

## 2024-01-12 RX ORDER — ATORVASTATIN CALCIUM 80 MG/1
40 TABLET, FILM COATED ORAL AT BEDTIME
Refills: 0 | Status: DISCONTINUED | OUTPATIENT
Start: 2024-01-12 | End: 2024-01-16

## 2024-01-12 RX ORDER — VALSARTAN 80 MG/1
320 TABLET ORAL DAILY
Refills: 0 | Status: DISCONTINUED | OUTPATIENT
Start: 2024-01-12 | End: 2024-01-16

## 2024-01-12 RX ORDER — ANASTROZOLE 1 MG/1
1 TABLET ORAL DAILY
Refills: 0 | Status: DISCONTINUED | OUTPATIENT
Start: 2024-01-12 | End: 2024-01-16

## 2024-01-12 RX ORDER — ASPIRIN/CALCIUM CARB/MAGNESIUM 324 MG
81 TABLET ORAL DAILY
Refills: 0 | Status: DISCONTINUED | OUTPATIENT
Start: 2024-01-12 | End: 2024-01-16

## 2024-01-12 RX ORDER — ACETAMINOPHEN 500 MG
1000 TABLET ORAL ONCE
Refills: 0 | Status: COMPLETED | OUTPATIENT
Start: 2024-01-12 | End: 2024-01-12

## 2024-01-12 RX ORDER — ANASTROZOLE 1 MG/1
1 TABLET ORAL
Refills: 0 | DISCHARGE

## 2024-01-12 RX ORDER — AMLODIPINE BESYLATE 2.5 MG/1
5 TABLET ORAL DAILY
Refills: 0 | Status: DISCONTINUED | OUTPATIENT
Start: 2024-01-12 | End: 2024-01-16

## 2024-01-12 RX ORDER — CITALOPRAM 10 MG/1
20 TABLET, FILM COATED ORAL DAILY
Refills: 0 | Status: DISCONTINUED | OUTPATIENT
Start: 2024-01-12 | End: 2024-01-16

## 2024-01-12 RX ORDER — RIVAROXABAN 15 MG-20MG
2.5 KIT ORAL
Refills: 0 | Status: DISCONTINUED | OUTPATIENT
Start: 2024-01-12 | End: 2024-01-14

## 2024-01-12 RX ADMIN — Medication 400 MILLIGRAM(S): at 20:45

## 2024-01-12 RX ADMIN — Medication 1000 MILLIGRAM(S): at 21:15

## 2024-01-12 RX ADMIN — RIVAROXABAN 2.5 MILLIGRAM(S): KIT at 18:28

## 2024-01-12 NOTE — H&P ADULT - TIME BILLING
Admission H&P including bedside history ,examination ,reviewing test results and treatement plan. D/W Cardiology attending. D/W patient and ACP

## 2024-01-12 NOTE — H&P ADULT - ASSESSMENT
72 y/o female with PMH HTN, CAD (6 stents; Cardiologist - Dr. Mendoza), HLD, arthritis presented to the ER c/o dizziness, epigastric abdominal pain and right hip pain. She has been feeling lightheaded for the past week and felt lightheaded at work today which prompted her to come to the ER. Pt denies chest pain, sob, LOC, vomiting, diarrhea, fevers, chills.  Pt reports 6 months of right hip pain worse the past 2 weeks.

## 2024-01-12 NOTE — H&P ADULT - NSICDXPASTMEDICALHX_GEN_ALL_CORE_FT
PAST MEDICAL HISTORY:  Anxiety     Anxiety and depression     Bradycardia     CAD (coronary artery disease) 6 stents, most recent 7/24/18    CAD (coronary artery disease)     Contracture of left ankle     Depression     GERD (gastroesophageal reflux disease)     GERD (gastroesophageal reflux disease)     History of left breast cancer     HLD (hyperlipidemia)     HTN (hypertension)     Hyperlipidemia     Hypertension     Macular hole s/p vitrectomy    Malignant neoplasm of left breast     Other chronic osteomyelitis of foot [id number]    Other specified arthritis of left ankle or foot     Other synovitis and tenosynovitis, left ankle and foot     Smoker Current smoker 40 pack years    Stented coronary artery      PAST MEDICAL HISTORY:  Anxiety     Anxiety and depression     Bradycardia     CAD (coronary artery disease) 6 stents, most recent 7/24/18    CAD (coronary artery disease)     Contracture of left ankle     Depression     GERD (gastroesophageal reflux disease)     GERD (gastroesophageal reflux disease)     History of left breast cancer     HLD (hyperlipidemia)     HTN (hypertension)     Hyperlipidemia     Hypertension     Macular hole s/p vitrectomy    Malignant neoplasm of left breast     Other chronic osteomyelitis of foot [id number]    Other specified arthritis of left ankle or foot     Other synovitis and tenosynovitis, left ankle and foot     Smoker Current smoker 40 pack years    Stented coronary artery

## 2024-01-12 NOTE — ED PROVIDER NOTE - NSICDXFAMILYHX_GEN_ALL_CORE_FT
FAMILY HISTORY:  Family history of Alzheimer's disease, Father    Sibling  Still living? Yes, Estimated age: Age Unknown  Family history of heart disease, Age at diagnosis: Age Unknown  Family history of prostate cancer, Age at diagnosis: Age Unknown  FH: breast cancer, Age at diagnosis: Age Unknown  FH: prostate cancer, Age at diagnosis: Age Unknown  FH: breast cancer, Age at diagnosis: Age Unknown    Grandparent  Still living? No  Family history of breast cancer, Age at diagnosis: Age Unknown  Family history of myocardial infarction, Age at diagnosis: Age Unknown  FH: breast cancer, Age at diagnosis: Age Unknown

## 2024-01-12 NOTE — CONSULT NOTE ADULT - NS ATTEND AMEND GEN_ALL_CORE FT
Ms Silva is a very pleasant 74yo woman who presents from work ([employer]) with feelings of malaise and lightheadedness, ongoing for a couple of days, but either worsening or just becoming unbearable for not getting better.  Not having any chest pain palpitations or SOB.  Associated ROS: some eyeball pain, chills, dyspepsia, poor appetite, fatigue.  Has known hx of CAD with multiple stents.  Normal LVEF% on echo this month.  Known PVC's (0 to 8% depending on the day, on recent Holter).  Cardiology called for continuity of care, and for low level troponin elevation on intake labs (65-->61), upper limit of normal on this assay is 50.  EKG with mild QT prolongation that isnt explained by any of her meds.  Infrequent PVCs on telemetry.  More likely having type-2-MI related to whatever her primary illness is, which needs further clarification.  continue aspirin and atorvastatin.  consider stress testing before discharge to determine ischemia burden and determine if she needs invasive workup or medical mgmt.  will follow with you. Ms Silva is a very pleasant 74yo woman who presents from work ([employer]) with feelings of malaise and lightheadedness, ongoing for a couple of days, but either worsening or just becoming unbearable for not getting better.  Not having any chest pain palpitations or SOB.  Associated ROS: some eyeball pain, chills, dyspepsia, poor appetite, fatigue.  Has known hx of CAD with multiple stents.  Normal LVEF% on echo this month.  Known PVC's (0 to 8% depending on the day, on recent Holter).  Cardiology called for continuity of care, and for low level troponin elevation on intake labs (65-->61), upper limit of normal on this assay is 50.  EKG with mild QT prolongation that isnt explained by any of her meds.  Infrequent PVCs on telemetry.  More likely having type-2-MI related to whatever her primary illness is, which needs further clarification.  continue aspirin and atorvastatin.  consider stress testing before discharge to determine ischemia burden and determine if she needs invasive workup or medical mgmt.  will follow with you.

## 2024-01-12 NOTE — ED ADULT TRIAGE NOTE - CHIEF COMPLAINT QUOTE
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 past med hx htn, arthritis, foot surgery, current smoker

## 2024-01-12 NOTE — ED PROVIDER NOTE - NSICDXPASTMEDICALHX_GEN_ALL_CORE_FT
PAST MEDICAL HISTORY:  Anxiety     Anxiety and depression     Bradycardia     CAD (coronary artery disease) 6 stents, most recent 7/24/18    CAD (coronary artery disease)     Contracture of left ankle     Depression     GERD (gastroesophageal reflux disease)     GERD (gastroesophageal reflux disease)     History of left breast cancer     HLD (hyperlipidemia)     HTN (hypertension)     Hyperlipidemia     Hypertension     Macular hole s/p vitrectomy    Malignant neoplasm of left breast     Other chronic osteomyelitis of foot [id number]    Other specified arthritis of left ankle or foot     Other synovitis and tenosynovitis, left ankle and foot     Smoker Current smoker 40 pack years    Stented coronary artery      PAST MEDICAL HISTORY:  Anxiety     Anxiety and depression     Bradycardia     CAD (coronary artery disease) 6 stents, most recent 7/24/18    CAD (coronary artery disease)     Contracture of left ankle     Depression     GERD (gastroesophageal reflux disease)     GERD (gastroesophageal reflux disease)     History of left breast cancer     HLD (hyperlipidemia)     HTN (hypertension)     Hyperlipidemia     Hypertension     Macular hole s/p vitrectomy    Malignant neoplasm of left breast     Other chronic osteomyelitis of foot [id number]    Other specified arthritis of left ankle or foot     Other synovitis and tenosynovitis, left ankle and foot     Smoker Current smoker 40 pack years    Stented coronary artery

## 2024-01-12 NOTE — ED ADULT NURSE REASSESSMENT NOTE - NS ED NURSE REASSESS COMMENT FT1
Patient remains at baseline mental status A&Ox4. Appears to be resting comfortably in stretcher, breathing even and unlabored on room air. Vital signs as charted. NAD noted at this time. Sinus shade on monitor. Pt offers no new complaints at this time. Repeat trop drawn and sent. Safety maintained, stretcher locked in lowest position with siderails up x2, call bell and personal items within reach.
Pt reported hip pain, IV Tylenol administered as ordered, Pt denies any chest pain, abdominal pain or SOB. sinus rhythm to sinus shade with PVCs on tele. no distress noted. Pt is awaiting  bed assignment. safety maintained. call bell  with in reach. will continue to monitor.

## 2024-01-12 NOTE — ED PROVIDER NOTE - CLINICAL SUMMARY MEDICAL DECISION MAKING FREE TEXT BOX
72 y/o female with PMH HTN, CAD (6 stents; Cardiologist - Dr. Mendoza), HLD, arthritis presented to the ER c/o dizziness, epigastric abdominal pain and right hip pain. She has been feeling lightheaded for the past week and felt lightheaded at work today which prompted her to come to the ER.  Pt is well appearing, NAD, will obtain labs, EKG, xrays, US RUQ, cardiac monitor, pt declined pain medication at this time.

## 2024-01-12 NOTE — ED PROVIDER NOTE - NS ED ATTENDING STATEMENT MOD
This was a shared visit with the DAVID. I reviewed and verified the documentation. I have seen and examined this patient and fully participated in the care of this patient as the teaching attending.  The service was shared with the DAVID.  I reviewed and verified the documentation.

## 2024-01-12 NOTE — ED ADULT NURSE NOTE - NSFALLRISKINTERV_ED_ALL_ED
Assistance OOB with selected safe patient handling equipment if applicable/Assistance with ambulation/Communicate fall risk and risk factors to all staff, patient, and family/Encourage patient to sit up slowly, dangle for a short time, stand at bedside before walking/Monitor gait and stability/Orthostatic vital signs/Provide visual cue: yellow wristband, yellow gown, etc/Reinforce activity limits and safety measures with patient and family/Call bell, personal items and telephone in reach/Instruct patient to call for assistance before getting out of bed/chair/stretcher/Non-slip footwear applied when patient is off stretcher/[place] to call system/Physically safe environment - no spills, clutter or unnecessary equipment/Purposeful Proactive Rounding/Room/bathroom lighting operational, light cord in reach Assistance OOB with selected safe patient handling equipment if applicable/Assistance with ambulation/Communicate fall risk and risk factors to all staff, patient, and family/Encourage patient to sit up slowly, dangle for a short time, stand at bedside before walking/Monitor gait and stability/Orthostatic vital signs/Provide visual cue: yellow wristband, yellow gown, etc/Reinforce activity limits and safety measures with patient and family/Call bell, personal items and telephone in reach/Instruct patient to call for assistance before getting out of bed/chair/stretcher/Non-slip footwear applied when patient is off stretcher/[place] to call system/Physically safe environment - no spills, clutter or unnecessary equipment/Purposeful Proactive Rounding/Room/bathroom lighting operational, light cord in reach

## 2024-01-12 NOTE — ED PROVIDER NOTE - OBJECTIVE STATEMENT
72 y/o female with PMH HTN, CAD (6 stents; Cardiologist - Dr. Mendoza), HLD, arthritis. She comes in with complaints of dizziness, abdomen pain and right hip pain. She has been feeling dizzy for the past week and felt dizzy at work which prompted her to the ER. She denies LOC. It is associated with nausea and epigastric pain. She also has been having right hip pain s/p right toe amputation which has gotten worse in the past 2 weeks. 74 y/o female with PMH HTN, CAD (6 stents; Cardiologist - Dr. Mendoza), HLD, arthritis presented to the ER c/o dizziness, epigastric abdominal pain and right hip pain. She has been feeling lightheaded for the past week and felt lightheaded at work today which prompted her to come to the ER. Pt denies chest pain, sob, LOC, vomiting, diarrhea, fevers, chills.  Pt reports 6 months of right hip pain worse the past 2 weeks.

## 2024-01-12 NOTE — ED ADULT NURSE NOTE - NSICDXPASTMEDICALHX_GEN_ALL_CORE_FT
PAST MEDICAL HISTORY:  Anxiety     Anxiety and depression     Bradycardia     CAD (coronary artery disease) 6 stents, most recent 7/24/18    CAD (coronary artery disease)     Contracture of left ankle     Depression     GERD (gastroesophageal reflux disease)     GERD (gastroesophageal reflux disease)     History of left breast cancer     HLD (hyperlipidemia)     HTN (hypertension)     Hyperlipidemia     Hypertension     Macular hole s/p vitrectomy    Malignant neoplasm of left breast     Other chronic osteomyelitis of foot [id number]    Other specified arthritis of left ankle or foot     Other synovitis and tenosynovitis, left ankle and foot     Smoker Current smoker 40 pack years    Stented coronary artery      PAST MEDICAL HISTORY:  Anxiety     Anxiety and depression     Bradycardia     CAD (coronary artery disease) 6 stents, most recent 7/24/18    CAD (coronary artery disease)     Contracture of left ankle     Depression     GERD (gastroesophageal reflux disease)     GERD (gastroesophageal reflux disease)     History of left breast cancer     HLD (hyperlipidemia)     HTN (hypertension)     Hyperlipidemia     Hypertension     Macular hole s/p vitrectomy    Malignant neoplasm of left breast     Other chronic osteomyelitis of foot [id number]    Other specified arthritis of left ankle or foot     Other synovitis and tenosynovitis, left ankle and foot     Smoker Current smoker 40 pack years    Stented coronary artery

## 2024-01-12 NOTE — ED ADULT NURSE NOTE - OBJECTIVE STATEMENT
Patient arrives to room hallway spot 25A, AOx4 ambulatory. Hx of HTN, Arthritis, CAD- 6 stents placed (on Xarelto daily), foot surgery. c/o intermittent dizziness, nausea, fatigue, 8/10 right hip pain, and 6/10 mid upper abdominal pain x 3 weeks. Pt denies sick contacts. Denies vomiting, reports 1 episode of diarrhea this am, denies blood in stool. Denies dysuria, hematuria, urinary frequency/urgency, back/flank/abdominopelvic pain. Denies chest pain, palpitations, SOB, HA, vision changes, fever, chills, cough, numbness, tingling, dysuria. Breathing even and unlabored on room air, no signs of dyspnea or respiratory distress. Neuro status intact. Skin warm, dry, intact, appropriate for race, No signs of cyanosis/pallor noted. 20G IV placed in left ac. Labs drawn and sent. Vital signs as charted. Safety maintained, stretcher locked in lowest position with siderails up x2, call bell and personal items within reach.

## 2024-01-12 NOTE — H&P ADULT - HISTORY OF PRESENT ILLNESS
72 y/o female with PMH HTN, CAD (6 stents; Cardiologist - Dr. Mendoza), HLD, arthritis presented to the ER c/o dizziness, epigastric abdominal pain and right hip pain. She has been feeling lightheaded for the past week and felt lightheaded at work today which prompted her to come to the ER. Pt denies chest pain, sob, LOC, vomiting, diarrhea, fevers, chills.  Pt reports 6 months of right hip pain worse the past 2 weeks.  74 y/o female with PMH HTN, CAD (6 stents; Cardiologist - Dr. Mendoza), HLD, arthritis presented to the ER c/o dizziness, epigastric abdominal pain and right hip pain. She has been feeling lightheaded for the past week and felt lightheaded at work today which prompted her to come to the ER. Pt denies chest pain, sob, LOC, vomiting, diarrhea, fevers, chills.  Pt reports 6 months of right hip pain worse the past 2 weeks.

## 2024-01-12 NOTE — ED PROVIDER NOTE - PROGRESS NOTE DETAILS
SYEDA Crawford: received call from lab Troponin 65, pt is followed by [facility] spoke with SYEDA Jon advised of results.  Pt took aspirin this morning and is on Xarelto. Pt advised of results, pt resting comfortably, NAD. SYEDA Crawford: received call from lab Troponin 65, pt is followed by [facility] spoke with SYEDA Jon advised of results.  Pt took aspirin this morning and is on Xarelto. Pt advised of results, pt resting comfortably, NAD. SYEDA Crawford: pt seen bedside by Cardiology Dr Atkins advised to admit to Dr. Cruz for further workup.  Pt resting comfortably, NAD, denies any complaints at present time. Results reviewed with patient.  Patient aware and agreeable with plan.

## 2024-01-12 NOTE — ED PROVIDER NOTE - CARE PLAN
1 Principal Discharge DX:	Epigastric pain  Secondary Diagnosis:	Fatigue  Secondary Diagnosis:	Nausea  Secondary Diagnosis:	Right hip pain   Principal Discharge DX:	Elevated troponin  Secondary Diagnosis:	Fatigue  Secondary Diagnosis:	Nausea  Secondary Diagnosis:	Right hip pain  Secondary Diagnosis:	Abdominal pain

## 2024-01-12 NOTE — ED PROVIDER NOTE - ATTENDING CONTRIBUTION TO CARE
DR. DONIS, ATTENDING MD-  I personally saw the patient with the PA and performed a substantive portion of the visit including all aspects of the medical decision making.    72 y/o female h/o cad x6 stents, htn, hld, arthritis here with mmc:  epig pain, r hip pain, nausea, fatigue.  Evaluate for atypical acs gb pathology lyte abn anemia oa.  Obtain ekg cbc cmp trop lipase cxr ruq u/s give ivf bolus pain med antiemetic admit for further care and evaluation.

## 2024-01-12 NOTE — ED PROVIDER NOTE - PHYSICAL EXAMINATION
RLE: NV intact, pt able to flex and extend, +TTP over lateral aspect of right hip, no swelling/deformity.

## 2024-01-12 NOTE — CONSULT NOTE ADULT - SUBJECTIVE AND OBJECTIVE BOX
date of consult 24    HISTORY OF PRESENT ILLNESS: HPI:    73 year old Female with history of HTN, CAD hx remote RCA stent, s/p LCx stent in 2018, last C 2019 with patent stents, tobacco abuse, HLD, and GERD, presented from work today with multiple complaints.  She reports 1 week of epigastric and abdominal pain, with associated early satiety.  She alsp reports using Naprosyn daily for the last week for foot and hip pain.  She reports lightheadedness while working x 2 days, denies chest pain, palps or syncope.  She denies Nausea, vomiting, but reports intermittent diarrhea x few weeks and cough.        Last NST 2022 with no ischemia or infarct.  Echo 1/10/24 with LVEF 58%, severe LAE.        PAST MEDICAL & SURGICAL HISTORY:  CAD (coronary artery disease)  6 stents, most recent 18      GERD (gastroesophageal reflux disease)      Smoker  Current smoker 40 pack years      Bradycardia      Hypertension      Hyperlipidemia      Depression      Anxiety      Other chronic osteomyelitis of foot  [id number]      Macular hole  s/p vitrectomy      Malignant neoplasm of left breast      CAD (coronary artery disease)      Stented coronary artery      Other specified arthritis of left ankle or foot      Contracture of left ankle      Other synovitis and tenosynovitis, left ankle and foot      HTN (hypertension)      HLD (hyperlipidemia)      GERD (gastroesophageal reflux disease)      Anxiety and depression      History of left breast cancer       delivery delivered  x2      Stented coronary artery  x 6 most recent 18      Carpal tunnel syndrome, bilateral  b/l wrist surgery for carpal tunnel syndrome      S/P foot surgery, right  Bunionectomy and hammer toe with multiple revisions x 5      Amputation of toe  right middle toe      S/P foot surgery, right      H/O amputation of lesser toe      S/P cardiac cath      S/P lumpectomy, left breast      S/P radiation therapy      H/O colonoscopy      S/P endoscopy      MEDICATIONS  (STANDING):  Celexa 20 mg tablet 1 TABLET DAILY  Diovan  mg-25 mg tablet 1 TABLET DAILY  Xarelto 2.5 mg tablet 1 TABLET BID  amlodipine 5 mg tablet 1 TABLET DAILY  anastrozole 1 mg tablet 1 TABLET DAILY  aspirin 81 mg tablet,delayed release 1 TABLET DAILY  atorvastatin 40 mg tablet 1 TABLET DAILY        Allergies  No Known Allergies      FAMILY HISTORY:  Family history of myocardial infarction (Grandparent)  Paternal Grandmother diet in her 60's of MI    Family history of breast cancer (Grandparent)  Maternal Grandmother    Family history of heart disease (Sibling)  Twin sister has cardiac stents  Brother has cardiac stents    Family history of Alzheimer's disease  Father    Family history of prostate cancer (Sibling)  brother    FH: breast cancer (Sibling)  twin sister    FH: prostate cancer (Sibling)  Brother    FH: breast cancer (Sibling, Grandparent)    Noncontributory for premature coronary disease or sudden cardiac death    SOCIAL HISTORY:    [ ] Non-smoker  [x ] Smoker-- 1 PPD x 40 years  [ ] Alcohol    FLU VACCINE THIS YEAR STARTS IN AUGUST:  [ ] Yes    [ ] No    IF OVER 65 HAVE YOU EVER HAD A PNA VACCINE:  [ ] Yes    [ ] No       [ ] N/A      REVIEW OF SYSTEMS:  [ ]chest pain  [  ]shortness of breath  [  ]palpitations  [  ]syncope  [ x]near syncope [ ]upper extremity weakness   [ ] lower extremity weakness  [  ]diplopia  [  ]altered mental status   [  ]fevers  [ ]chills [ ]nausea  [ ]vomiting  [  ]dysphagia    [ x]abdominal pain  [ ]melena  [ ]BRBPR    [  ]epistaxis  [  ]rash    [ ]lower extremity edema        [x ] All others negative	  [ ] Unable to obtain      LABS:	 	    CARDIAC MARKERS:    TROP T 65                          11.6   10.15 )-----------( 306      ( 2024 10:51 )             35.3     Hb Trend: 11.6<--        140  |  100  |  18  ----------------------------<  109<H>  3.6   |  26  |  0.75    Ca    9.1      2024 10:51    TPro  7.4  /  Alb  3.8  /  TBili  0.6  /  DBili  x   /  AST  23  /  ALT  17  /  AlkPhos  116      Creatinine Trend: 0.75<--    Coags:  PT/INR - ( 2024 10:51 )   PT: 13.9 sec;   INR: 1.25 ratio          PHYSICAL EXAM:  T(C): 36.4 (24 @ 11:04), Max: 36.5 (24 @ 09:24)  HR: 60 (24 @ 11:04) (58 - 60)  BP: 138/72 (24 @ 11:04) (138/72 - 140/73)  RR: 18 (24 @ 11:04) (18 - 18)  SpO2: 98% (24 @ 11:04) (98% - 98%)  Wt(kg): --     I&O's Summary      Gen: Appears well in NAD  HEENT:  (-)icterus (-)pallor  CV: N S1 S2 1/6 MARISABEL (+)2 Pulses B/l  Resp:  Clear to ausculatation B/L, normal effort  GI: (+) BS Soft, NT, ND  Lymph:  (-)Edema, (-)obvious lymphadenopathy  Skin: Warm to touch, Normal turgor  Psych: Appropriate mood and affect      TELEMETRY: SR PVCs	      ECG:  	SR PVCs    < from: US Abdomen Complete (US Abdomen Complete .) (24 @ 11:30) >  IMPRESSION:  Dilated CBD measuring up to 1.0 cm. Distended gallbladder without   evidence of acute cholecystitis. Recommend further evaluation with MRCP   to rule out choledocholithiasis.    < end of copied text >      < from: Xray Chest 2 Views PA/Lat (24 @ 12:55) >  IMPRESSION: Clear lungs.    < end of copied text >      ASSESSMENT/PLAN: 	73 year old Female with history of HTN, CAD hx remote RCA stent, s/p LCx stent in 2018, last LHC 2019 with patent stents, tobacco abuse, HLD, and GERD, presented from work today with multiple complaints. She reports lightheadedness, abdominal pain, R hip pain    Cardiology consulted for hx CAD, well known to the office and elevated Trop T    #elevated Trop T  --Trend CE, add on CPK, CKMB  --pt without unstable anginal sx or acute EKG changes  --recent office echo 1/10/24 with Normal LV sys fxn  --may need repeat ischemic eval pending hospital course    #Abdominal Pain  --Abnormal US  --Work up per medicine    #CAD, hx PCI  --cont ASA, Statin  --oK to HOLD XARELTO (CAD dose) while inpatient in case of procedures    #lightheadedness  --check orthostatic vitals  --recent carotid US in the office without significant stenosis  --PT eval    #Hip Pain  --f/u imaging  --PT  --defer to medicine    further reccs pending above                     date of consult 24    HISTORY OF PRESENT ILLNESS: HPI:    73 year old Female with history of HTN, CAD hx remote RCA stent, s/p LCx stent in 2018, last C 2019 with patent stents, tobacco abuse, HLD, and GERD, presented from work today with multiple complaints.  She reports 1 week of epigastric and abdominal pain, with associated early satiety.  She alsp reports using Naprosyn daily for the last week for foot and hip pain.  She reports lightheadedness while working x 2 days, denies chest pain, palps or syncope.  She denies Nausea, vomiting, but reports intermittent diarrhea x few weeks and cough.        Last NST 2022 with no ischemia or infarct.  Echo 1/10/24 with LVEF 58%, severe LAE.        PAST MEDICAL & SURGICAL HISTORY:  CAD (coronary artery disease)  6 stents, most recent 18      GERD (gastroesophageal reflux disease)      Smoker  Current smoker 40 pack years      Bradycardia      Hypertension      Hyperlipidemia      Depression      Anxiety      Other chronic osteomyelitis of foot  [id number]      Macular hole  s/p vitrectomy      Malignant neoplasm of left breast      CAD (coronary artery disease)      Stented coronary artery      Other specified arthritis of left ankle or foot      Contracture of left ankle      Other synovitis and tenosynovitis, left ankle and foot      HTN (hypertension)      HLD (hyperlipidemia)      GERD (gastroesophageal reflux disease)      Anxiety and depression      History of left breast cancer       delivery delivered  x2      Stented coronary artery  x 6 most recent 18      Carpal tunnel syndrome, bilateral  b/l wrist surgery for carpal tunnel syndrome      S/P foot surgery, right  Bunionectomy and hammer toe with multiple revisions x 5      Amputation of toe  right middle toe      S/P foot surgery, right      H/O amputation of lesser toe      S/P cardiac cath      S/P lumpectomy, left breast      S/P radiation therapy      H/O colonoscopy      S/P endoscopy      MEDICATIONS  (STANDING):  Celexa 20 mg tablet 1 TABLET DAILY  Diovan  mg-25 mg tablet 1 TABLET DAILY  Xarelto 2.5 mg tablet 1 TABLET BID  amlodipine 5 mg tablet 1 TABLET DAILY  anastrozole 1 mg tablet 1 TABLET DAILY  aspirin 81 mg tablet,delayed release 1 TABLET DAILY  atorvastatin 40 mg tablet 1 TABLET DAILY        Allergies  No Known Allergies      FAMILY HISTORY:  Family history of myocardial infarction (Grandparent)  Paternal Grandmother diet in her 60's of MI    Family history of breast cancer (Grandparent)  Maternal Grandmother    Family history of heart disease (Sibling)  Twin sister has cardiac stents  Brother has cardiac stents    Family history of Alzheimer's disease  Father    Family history of prostate cancer (Sibling)  brother    FH: breast cancer (Sibling)  twin sister    FH: prostate cancer (Sibling)  Brother    FH: breast cancer (Sibling, Grandparent)    Noncontributory for premature coronary disease or sudden cardiac death    SOCIAL HISTORY:    [ ] Non-smoker  [x ] Smoker-- 1 PPD x 40 years  [ ] Alcohol    FLU VACCINE THIS YEAR STARTS IN AUGUST:  [ ] Yes    [ ] No    IF OVER 65 HAVE YOU EVER HAD A PNA VACCINE:  [ ] Yes    [ ] No       [ ] N/A      REVIEW OF SYSTEMS:  [ ]chest pain  [  ]shortness of breath  [  ]palpitations  [  ]syncope  [ x]near syncope [ ]upper extremity weakness   [ ] lower extremity weakness  [  ]diplopia  [  ]altered mental status   [  ]fevers  [ ]chills [ ]nausea  [ ]vomiting  [  ]dysphagia    [ x]abdominal pain  [ ]melena  [ ]BRBPR    [  ]epistaxis  [  ]rash    [ ]lower extremity edema        [x ] All others negative	  [ ] Unable to obtain      LABS:	 	    CARDIAC MARKERS:    TROP T 65                          11.6   10.15 )-----------( 306      ( 2024 10:51 )             35.3     Hb Trend: 11.6<--        140  |  100  |  18  ----------------------------<  109<H>  3.6   |  26  |  0.75    Ca    9.1      2024 10:51    TPro  7.4  /  Alb  3.8  /  TBili  0.6  /  DBili  x   /  AST  23  /  ALT  17  /  AlkPhos  116      Creatinine Trend: 0.75<--    Coags:  PT/INR - ( 2024 10:51 )   PT: 13.9 sec;   INR: 1.25 ratio          PHYSICAL EXAM:  T(C): 36.4 (24 @ 11:04), Max: 36.5 (24 @ 09:24)  HR: 60 (24 @ 11:04) (58 - 60)  BP: 138/72 (24 @ 11:04) (138/72 - 140/73)  RR: 18 (24 @ 11:04) (18 - 18)  SpO2: 98% (24 @ 11:04) (98% - 98%)  Wt(kg): --     I&O's Summary      Gen: Appears well in NAD  HEENT:  (-)icterus (-)pallor  CV: N S1 S2 1/6 MARISABEL (+)2 Pulses B/l  Resp:  Clear to ausculatation B/L, normal effort  GI: (+) BS Soft, NT, ND  Lymph:  (-)Edema, (-)obvious lymphadenopathy  Skin: Warm to touch, Normal turgor  Psych: Appropriate mood and affect      TELEMETRY: SR PVCs	      ECG:  	SR PVCs    < from: US Abdomen Complete (US Abdomen Complete .) (24 @ 11:30) >  IMPRESSION:  Dilated CBD measuring up to 1.0 cm. Distended gallbladder without   evidence of acute cholecystitis. Recommend further evaluation with MRCP   to rule out choledocholithiasis.    < end of copied text >      < from: Xray Chest 2 Views PA/Lat (24 @ 12:55) >  IMPRESSION: Clear lungs.    < end of copied text >      ASSESSMENT/PLAN: 	73 year old Female with history of HTN, CAD hx remote RCA stent, s/p LCx stent in 2018, last LHC 2019 with patent stents, tobacco abuse, HLD, and GERD, presented from work today with multiple complaints. She reports lightheadedness, abdominal pain, R hip pain    Cardiology consulted for hx CAD, well known to the office and elevated Trop T    #elevated Trop T  --Trend CE, add on CPK, CKMB  --pt without unstable anginal sx or acute EKG changes  --recent office echo 1/10/24 with Normal LV sys fxn  --may need repeat ischemic eval pending hospital course    #Abdominal Pain  --Abnormal US  --Work up per medicine    #CAD, hx PCI  --cont ASA, Statin  --oK to HOLD XARELTO (CAD dose) while inpatient in case of procedures    #lightheadedness  --check orthostatic vitals  --recent carotid US in the office without significant stenosis  --PT eval    #Hip Pain  --f/u imaging  --PT  --defer to medicine    further reccs pending above                     date of consult 24    HISTORY OF PRESENT ILLNESS: HPI:    73 year old Female with history of HTN, CAD hx remote RCA stent, s/p LCx stent in 2018, last C 2019 with patent stents, tobacco abuse, HLD, and GERD, presented from work today with multiple complaints.  She reports 1 week of epigastric and abdominal pain, with associated early satiety.  She alsp reports using Naprosyn daily for the last week for foot and hip pain.  She reports lightheadedness while working x 2 days, denies chest pain, palps or syncope.  She denies Nausea, vomiting, but reports intermittent diarrhea x few weeks and cough.        Last NST 2022 with no ischemia or infarct.  Echo 1/10/24 with LVEF 58%, severe LAE.        PAST MEDICAL & SURGICAL HISTORY:  CAD (coronary artery disease)  6 stents, most recent 18  GERD (gastroesophageal reflux disease)  Smoker  Current smoker 40 pack years  Bradycardia  hypertension  Hyperlipidemia  Depression  Anxiety  Other chronic osteomyelitis of foot  [id number]  Macular hole  s/p vitrectomy  Malignant neoplasm of left breast  CAD (coronary artery disease)  Stented coronary artery  Other specified arthritis of left ankle or foot  Contracture of left ankle  Other synovitis and tenosynovitis, left ankle and foot  HTN (hypertension)  HLD (hyperlipidemia)  GERD (gastroesophageal reflux disease)  Anxiety and depression  History of left breast cancer   delivery delivered  x2  Carpal tunnel syndrome, bilateral  b/l wrist surgery for carpal tunnel syndrome  S/P foot surgery, right  Bunionectomy and hammer toe with multiple revisions x 5  Amputation of toe  right middle toe  H/O amputation of lesser toe      MEDICATIONS  (STANDING):  Celexa 20 mg tablet 1 TABLET DAILY  Diovan  mg-25 mg tablet 1 TABLET DAILY  Xarelto 2.5 mg tablet 1 TABLET BID  amlodipine 5 mg tablet 1 TABLET DAILY  anastrozole 1 mg tablet 1 TABLET DAILY  aspirin 81 mg tablet,delayed release 1 TABLET DAILY  atorvastatin 40 mg tablet 1 TABLET DAILY        Allergies  No Known Allergies      FAMILY HISTORY:  Family history of myocardial infarction (Grandparent)  Paternal Grandmother diet in her 60's of MI    Family history of breast cancer (Grandparent)  Maternal Grandmother    Family history of heart disease (Sibling)  Twin sister has cardiac stents  Brother has cardiac stents    Family history of Alzheimer's disease  Father    Family history of prostate cancer (Sibling)  brother    FH: breast cancer (Sibling)  twin sister    FH: prostate cancer (Sibling)  Brother    FH: breast cancer (Sibling, Grandparent)    Noncontributory for premature coronary disease or sudden cardiac death    SOCIAL HISTORY:    [ ] Non-smoker  [x ] Smoker-- 1 PPD x 40 years  [ ] Alcohol    FLU VACCINE THIS YEAR STARTS IN AUGUST:  [ ] Yes    [ ] No    IF OVER 65 HAVE YOU EVER HAD A PNA VACCINE:  [ ] Yes    [ ] No       [ ] N/A      REVIEW OF SYSTEMS:  [ ]chest pain  [  ]shortness of breath  [  ]palpitations  [  ]syncope  [ x]near syncope [ ]upper extremity weakness   [ ] lower extremity weakness  [  ]diplopia  [  ]altered mental status   [  ]fevers  [ ]chills [ ]nausea  [ ]vomiting  [  ]dysphagia    [ x]abdominal pain  [ ]melena  [ ]BRBPR    [  ]epistaxis  [  ]rash    [ ]lower extremity edema        [x ] All others negative	  [ ] Unable to obtain      LABS:	 	    CARDIAC MARKERS:    TROP T 65                          11.6   10.15 )-----------( 306      ( 2024 10:51 )             35.3     Hb Trend: 11.6<--        140  |  100  |  18  ----------------------------<  109<H>  3.6   |  26  |  0.75    Ca    9.1      2024 10:51    TPro  7.4  /  Alb  3.8  /  TBili  0.6  /  DBili  x   /  AST  23  /  ALT  17  /  AlkPhos  116      Creatinine Trend: 0.75<--    Coags:  PT/INR - ( 2024 10:51 )   PT: 13.9 sec;   INR: 1.25 ratio          PHYSICAL EXAM:  T(C): 36.4 (24 @ 11:04), Max: 36.5 (24 @ 09:24)  HR: 60 (24 @ 11:04) (58 - 60)  BP: 138/72 (24 @ 11:04) (138/72 - 140/73)  RR: 18 (24 @ 11:04) (18 - 18)  SpO2: 98% (24 @ 11:04) (98% - 98%)  Wt(kg): --     I&O's Summary      Gen: Appears well in NAD  HEENT:  (-)icterus (-)pallor  CV: N S1 S2 1/6 MARISABEL (+)2 Pulses B/l  Resp:  Clear to ausculatation B/L, normal effort  GI: (+) BS Soft, NT, ND  Lymph:  (-)Edema, (-)obvious lymphadenopathy  Skin: Warm to touch, Normal turgor  Psych: Appropriate mood and affect      TELEMETRY: SR PVCs	      ECG:  	SR PVCs    < from: US Abdomen Complete (US Abdomen Complete .) (24 @ 11:30) >  IMPRESSION:  Dilated CBD measuring up to 1.0 cm. Distended gallbladder without   evidence of acute cholecystitis. Recommend further evaluation with MRCP   to rule out choledocholithiasis.    < end of copied text >      < from: Xray Chest 2 Views PA/Lat (24 @ 12:55) >  IMPRESSION: Clear lungs.    < end of copied text >      ASSESSMENT/PLAN: 	73 year old Female with history of HTN, CAD hx remote RCA stent, s/p LCx stent in 2018, last C 2019 with patent stents, tobacco abuse, HLD, and GERD, presented from work today with multiple complaints. She reports lightheadedness, abdominal pain, R hip pain    Cardiology consulted for hx CAD, well known to the office and elevated Trop T    #elevated Trop T  --Trend CE, add on CPK, CKMB  --pt without unstable anginal sx or acute EKG changes  --recent office echo 1/10/24 with Normal LV sys fxn  --may need repeat ischemic eval pending hospital course    #Abdominal Pain  --Abnormal US  --Work up per medicine    #CAD, hx PCI  --cont ASA, Statin  --oK to HOLD XARELTO (CAD dose) while inpatient in case of procedures    #lightheadedness  --check orthostatic vitals  --recent carotid US in the office without significant stenosis  --PT eval    #Hip Pain  --f/u imaging  --PT  --defer to medicine    further reccs pending above                     date of consult 24    HISTORY OF PRESENT ILLNESS: HPI:    73 year old Female with history of HTN, CAD hx remote RCA stent, s/p LCx stent in 2018, last C 2019 with patent stents, tobacco abuse, HLD, and GERD, presented from work today with multiple complaints.  She reports 1 week of epigastric and abdominal pain, with associated early satiety.  She alsp reports using Naprosyn daily for the last week for foot and hip pain.  She reports lightheadedness while working x 2 days, denies chest pain, palps or syncope.  She denies Nausea, vomiting, but reports intermittent diarrhea x few weeks and cough.        Last NST 2022 with no ischemia or infarct.  Echo 1/10/24 with LVEF 58%, severe LAE.        PAST MEDICAL & SURGICAL HISTORY:  CAD (coronary artery disease)  6 stents, most recent 18  GERD (gastroesophageal reflux disease)  Smoker  Current smoker 40 pack years  Bradycardia  hypertension  Hyperlipidemia  Depression  Anxiety  Other chronic osteomyelitis of foot  [id number]  Macular hole  s/p vitrectomy  Malignant neoplasm of left breast  CAD (coronary artery disease)  Stented coronary artery  Other specified arthritis of left ankle or foot  Contracture of left ankle  Other synovitis and tenosynovitis, left ankle and foot  HTN (hypertension)  HLD (hyperlipidemia)  GERD (gastroesophageal reflux disease)  Anxiety and depression  History of left breast cancer   delivery delivered  x2  Carpal tunnel syndrome, bilateral  b/l wrist surgery for carpal tunnel syndrome  S/P foot surgery, right  Bunionectomy and hammer toe with multiple revisions x 5  Amputation of toe  right middle toe  H/O amputation of lesser toe      MEDICATIONS  (STANDING):  Celexa 20 mg tablet 1 TABLET DAILY  Diovan  mg-25 mg tablet 1 TABLET DAILY  Xarelto 2.5 mg tablet 1 TABLET BID  amlodipine 5 mg tablet 1 TABLET DAILY  anastrozole 1 mg tablet 1 TABLET DAILY  aspirin 81 mg tablet,delayed release 1 TABLET DAILY  atorvastatin 40 mg tablet 1 TABLET DAILY        Allergies  No Known Allergies      FAMILY HISTORY:  Family history of myocardial infarction (Grandparent)  Paternal Grandmother diet in her 60's of MI    Family history of breast cancer (Grandparent)  Maternal Grandmother    Family history of heart disease (Sibling)  Twin sister has cardiac stents  Brother has cardiac stents    Family history of Alzheimer's disease  Father    Family history of prostate cancer (Sibling)  brother    FH: breast cancer (Sibling)  twin sister    FH: prostate cancer (Sibling)  Brother    FH: breast cancer (Sibling, Grandparent)    Noncontributory for premature coronary disease or sudden cardiac death    SOCIAL HISTORY:    [ ] Non-smoker  [x ] Smoker-- 1 PPD x 40 years  [ ] Alcohol    FLU VACCINE THIS YEAR STARTS IN AUGUST:  [ ] Yes    [ ] No    IF OVER 65 HAVE YOU EVER HAD A PNA VACCINE:  [ ] Yes    [ ] No       [ ] N/A      REVIEW OF SYSTEMS:  [ ]chest pain  [  ]shortness of breath  [  ]palpitations  [  ]syncope  [ x]near syncope [ ]upper extremity weakness   [ ] lower extremity weakness  [  ]diplopia  [  ]altered mental status   [  ]fevers  [ ]chills [ ]nausea  [ ]vomiting  [  ]dysphagia    [ x]abdominal pain  [ ]melena  [ ]BRBPR    [  ]epistaxis  [  ]rash    [ ]lower extremity edema        [x ] All others negative	  [ ] Unable to obtain      LABS:	 	    CARDIAC MARKERS:    TROP T 65                          11.6   10.15 )-----------( 306      ( 2024 10:51 )             35.3     Hb Trend: 11.6<--        140  |  100  |  18  ----------------------------<  109<H>  3.6   |  26  |  0.75    Ca    9.1      2024 10:51    TPro  7.4  /  Alb  3.8  /  TBili  0.6  /  DBili  x   /  AST  23  /  ALT  17  /  AlkPhos  116      Creatinine Trend: 0.75<--    Coags:  PT/INR - ( 2024 10:51 )   PT: 13.9 sec;   INR: 1.25 ratio          PHYSICAL EXAM:  T(C): 36.4 (24 @ 11:04), Max: 36.5 (24 @ 09:24)  HR: 60 (24 @ 11:04) (58 - 60)  BP: 138/72 (24 @ 11:04) (138/72 - 140/73)  RR: 18 (24 @ 11:04) (18 - 18)  SpO2: 98% (24 @ 11:04) (98% - 98%)  Wt(kg): --     I&O's Summary      Gen: Appears well in NAD  HEENT:  (-)icterus (-)pallor  CV: N S1 S2 1/6 MARISABEL (+)2 Pulses B/l  Resp:  Clear to ausculatation B/L, normal effort  GI: (+) BS Soft, NT, ND  Lymph:  (-)Edema, (-)obvious lymphadenopathy  Skin: Warm to touch, Normal turgor  Psych: Appropriate mood and affect      TELEMETRY: SR PVCs	      ECG:  	SR PVCs    < from: US Abdomen Complete (US Abdomen Complete .) (24 @ 11:30) >  IMPRESSION:  Dilated CBD measuring up to 1.0 cm. Distended gallbladder without   evidence of acute cholecystitis. Recommend further evaluation with MRCP   to rule out choledocholithiasis.    < end of copied text >      < from: Xray Chest 2 Views PA/Lat (24 @ 12:55) >  IMPRESSION: Clear lungs.    < end of copied text >      ASSESSMENT/PLAN: 	73 year old Female with history of HTN, CAD hx remote RCA stent, s/p LCx stent in 2018, last C 2019 with patent stents, tobacco abuse, HLD, and GERD, presented from work today with multiple complaints. She reports lightheadedness, abdominal pain, R hip pain    Cardiology consulted for hx CAD, well known to the office and elevated Trop T    #elevated Trop T  --Trend CE, add on CPK, CKMB  --pt without unstable anginal sx or acute EKG changes  --recent office echo 1/10/24 with Normal LV sys fxn  --may need repeat ischemic eval pending hospital course    #Abdominal Pain  --Abnormal US  --Work up per medicine    #CAD, hx PCI  --cont ASA, Statin  --oK to HOLD XARELTO (CAD dose) while inpatient in case of procedures    #lightheadedness  --check orthostatic vitals  --recent carotid US in the office without significant stenosis  --PT eval    #Hip Pain  --f/u imaging  --PT  --defer to medicine    further reccs pending above

## 2024-01-12 NOTE — ED PROVIDER NOTE - NSICDXPASTSURGICALHX_GEN_ALL_CORE_FT
PAST SURGICAL HISTORY:  Amputation of toe right middle toe    Carpal tunnel syndrome, bilateral b/l wrist surgery for carpal tunnel syndrome     delivery delivered x2    H/O amputation of lesser toe     H/O colonoscopy     S/P cardiac cath     S/P endoscopy     S/P foot surgery, right Bunionectomy and hammer toe with multiple revisions x 5    S/P foot surgery, right     S/P lumpectomy, left breast     S/P radiation therapy     Stented coronary artery x 6 most recent 18

## 2024-01-13 LAB
ALBUMIN SERPL ELPH-MCNC: 3.3 G/DL — SIGNIFICANT CHANGE UP (ref 3.3–5)
ALBUMIN SERPL ELPH-MCNC: 3.3 G/DL — SIGNIFICANT CHANGE UP (ref 3.3–5)
ALP SERPL-CCNC: 106 U/L — SIGNIFICANT CHANGE UP (ref 40–120)
ALP SERPL-CCNC: 106 U/L — SIGNIFICANT CHANGE UP (ref 40–120)
ALT FLD-CCNC: 14 U/L — SIGNIFICANT CHANGE UP (ref 4–33)
ALT FLD-CCNC: 14 U/L — SIGNIFICANT CHANGE UP (ref 4–33)
ANION GAP SERPL CALC-SCNC: 13 MMOL/L — SIGNIFICANT CHANGE UP (ref 7–14)
ANION GAP SERPL CALC-SCNC: 13 MMOL/L — SIGNIFICANT CHANGE UP (ref 7–14)
APPEARANCE UR: CLEAR — SIGNIFICANT CHANGE UP
APPEARANCE UR: CLEAR — SIGNIFICANT CHANGE UP
AST SERPL-CCNC: 21 U/L — SIGNIFICANT CHANGE UP (ref 4–32)
AST SERPL-CCNC: 21 U/L — SIGNIFICANT CHANGE UP (ref 4–32)
BILIRUB SERPL-MCNC: 0.6 MG/DL — SIGNIFICANT CHANGE UP (ref 0.2–1.2)
BILIRUB SERPL-MCNC: 0.6 MG/DL — SIGNIFICANT CHANGE UP (ref 0.2–1.2)
BILIRUB UR-MCNC: NEGATIVE — SIGNIFICANT CHANGE UP
BILIRUB UR-MCNC: NEGATIVE — SIGNIFICANT CHANGE UP
BUN SERPL-MCNC: 14 MG/DL — SIGNIFICANT CHANGE UP (ref 7–23)
BUN SERPL-MCNC: 14 MG/DL — SIGNIFICANT CHANGE UP (ref 7–23)
CALCIUM SERPL-MCNC: 9 MG/DL — SIGNIFICANT CHANGE UP (ref 8.4–10.5)
CALCIUM SERPL-MCNC: 9 MG/DL — SIGNIFICANT CHANGE UP (ref 8.4–10.5)
CHLORIDE SERPL-SCNC: 101 MMOL/L — SIGNIFICANT CHANGE UP (ref 98–107)
CHLORIDE SERPL-SCNC: 101 MMOL/L — SIGNIFICANT CHANGE UP (ref 98–107)
CO2 SERPL-SCNC: 25 MMOL/L — SIGNIFICANT CHANGE UP (ref 22–31)
CO2 SERPL-SCNC: 25 MMOL/L — SIGNIFICANT CHANGE UP (ref 22–31)
COLOR SPEC: YELLOW — SIGNIFICANT CHANGE UP
COLOR SPEC: YELLOW — SIGNIFICANT CHANGE UP
CREAT SERPL-MCNC: 0.61 MG/DL — SIGNIFICANT CHANGE UP (ref 0.5–1.3)
CREAT SERPL-MCNC: 0.61 MG/DL — SIGNIFICANT CHANGE UP (ref 0.5–1.3)
DIFF PNL FLD: NEGATIVE — SIGNIFICANT CHANGE UP
DIFF PNL FLD: NEGATIVE — SIGNIFICANT CHANGE UP
EGFR: 95 ML/MIN/1.73M2 — SIGNIFICANT CHANGE UP
EGFR: 95 ML/MIN/1.73M2 — SIGNIFICANT CHANGE UP
GLUCOSE SERPL-MCNC: 85 MG/DL — SIGNIFICANT CHANGE UP (ref 70–99)
GLUCOSE SERPL-MCNC: 85 MG/DL — SIGNIFICANT CHANGE UP (ref 70–99)
GLUCOSE UR QL: NEGATIVE MG/DL — SIGNIFICANT CHANGE UP
GLUCOSE UR QL: NEGATIVE MG/DL — SIGNIFICANT CHANGE UP
HCT VFR BLD CALC: 35.2 % — SIGNIFICANT CHANGE UP (ref 34.5–45)
HCT VFR BLD CALC: 35.2 % — SIGNIFICANT CHANGE UP (ref 34.5–45)
HGB BLD-MCNC: 11.7 G/DL — SIGNIFICANT CHANGE UP (ref 11.5–15.5)
HGB BLD-MCNC: 11.7 G/DL — SIGNIFICANT CHANGE UP (ref 11.5–15.5)
KETONES UR-MCNC: NEGATIVE MG/DL — SIGNIFICANT CHANGE UP
KETONES UR-MCNC: NEGATIVE MG/DL — SIGNIFICANT CHANGE UP
LEUKOCYTE ESTERASE UR-ACNC: NEGATIVE — SIGNIFICANT CHANGE UP
LEUKOCYTE ESTERASE UR-ACNC: NEGATIVE — SIGNIFICANT CHANGE UP
MCHC RBC-ENTMCNC: 32.7 PG — SIGNIFICANT CHANGE UP (ref 27–34)
MCHC RBC-ENTMCNC: 32.7 PG — SIGNIFICANT CHANGE UP (ref 27–34)
MCHC RBC-ENTMCNC: 33.2 GM/DL — SIGNIFICANT CHANGE UP (ref 32–36)
MCHC RBC-ENTMCNC: 33.2 GM/DL — SIGNIFICANT CHANGE UP (ref 32–36)
MCV RBC AUTO: 98.3 FL — SIGNIFICANT CHANGE UP (ref 80–100)
MCV RBC AUTO: 98.3 FL — SIGNIFICANT CHANGE UP (ref 80–100)
NITRITE UR-MCNC: NEGATIVE — SIGNIFICANT CHANGE UP
NITRITE UR-MCNC: NEGATIVE — SIGNIFICANT CHANGE UP
NRBC # BLD: 0 /100 WBCS — SIGNIFICANT CHANGE UP (ref 0–0)
NRBC # BLD: 0 /100 WBCS — SIGNIFICANT CHANGE UP (ref 0–0)
NRBC # FLD: 0 K/UL — SIGNIFICANT CHANGE UP (ref 0–0)
NRBC # FLD: 0 K/UL — SIGNIFICANT CHANGE UP (ref 0–0)
PH UR: 6.5 — SIGNIFICANT CHANGE UP (ref 5–8)
PH UR: 6.5 — SIGNIFICANT CHANGE UP (ref 5–8)
PLATELET # BLD AUTO: 262 K/UL — SIGNIFICANT CHANGE UP (ref 150–400)
PLATELET # BLD AUTO: 262 K/UL — SIGNIFICANT CHANGE UP (ref 150–400)
POTASSIUM SERPL-MCNC: 3.4 MMOL/L — LOW (ref 3.5–5.3)
POTASSIUM SERPL-MCNC: 3.4 MMOL/L — LOW (ref 3.5–5.3)
POTASSIUM SERPL-SCNC: 3.4 MMOL/L — LOW (ref 3.5–5.3)
POTASSIUM SERPL-SCNC: 3.4 MMOL/L — LOW (ref 3.5–5.3)
PROT SERPL-MCNC: 6.6 G/DL — SIGNIFICANT CHANGE UP (ref 6–8.3)
PROT SERPL-MCNC: 6.6 G/DL — SIGNIFICANT CHANGE UP (ref 6–8.3)
PROT UR-MCNC: NEGATIVE MG/DL — SIGNIFICANT CHANGE UP
PROT UR-MCNC: NEGATIVE MG/DL — SIGNIFICANT CHANGE UP
RBC # BLD: 3.58 M/UL — LOW (ref 3.8–5.2)
RBC # BLD: 3.58 M/UL — LOW (ref 3.8–5.2)
RBC # FLD: 13.6 % — SIGNIFICANT CHANGE UP (ref 10.3–14.5)
RBC # FLD: 13.6 % — SIGNIFICANT CHANGE UP (ref 10.3–14.5)
SODIUM SERPL-SCNC: 139 MMOL/L — SIGNIFICANT CHANGE UP (ref 135–145)
SODIUM SERPL-SCNC: 139 MMOL/L — SIGNIFICANT CHANGE UP (ref 135–145)
SP GR SPEC: 1.02 — SIGNIFICANT CHANGE UP (ref 1–1.03)
SP GR SPEC: 1.02 — SIGNIFICANT CHANGE UP (ref 1–1.03)
UROBILINOGEN FLD QL: 4 MG/DL (ref 0.2–1)
UROBILINOGEN FLD QL: 4 MG/DL (ref 0.2–1)
WBC # BLD: 6.42 K/UL — SIGNIFICANT CHANGE UP (ref 3.8–10.5)
WBC # BLD: 6.42 K/UL — SIGNIFICANT CHANGE UP (ref 3.8–10.5)
WBC # FLD AUTO: 6.42 K/UL — SIGNIFICANT CHANGE UP (ref 3.8–10.5)
WBC # FLD AUTO: 6.42 K/UL — SIGNIFICANT CHANGE UP (ref 3.8–10.5)

## 2024-01-13 PROCEDURE — 74183 MRI ABD W/O CNTR FLWD CNTR: CPT | Mod: 26

## 2024-01-13 RX ORDER — POTASSIUM CHLORIDE 20 MEQ
40 PACKET (EA) ORAL EVERY 4 HOURS
Refills: 0 | Status: COMPLETED | OUTPATIENT
Start: 2024-01-13 | End: 2024-01-13

## 2024-01-13 RX ADMIN — ATORVASTATIN CALCIUM 40 MILLIGRAM(S): 80 TABLET, FILM COATED ORAL at 21:16

## 2024-01-13 RX ADMIN — RIVAROXABAN 2.5 MILLIGRAM(S): KIT at 19:30

## 2024-01-13 RX ADMIN — Medication 40 MILLIEQUIVALENT(S): at 09:37

## 2024-01-13 RX ADMIN — VALSARTAN 320 MILLIGRAM(S): 80 TABLET ORAL at 05:57

## 2024-01-13 RX ADMIN — AMLODIPINE BESYLATE 5 MILLIGRAM(S): 2.5 TABLET ORAL at 05:57

## 2024-01-13 RX ADMIN — RIVAROXABAN 2.5 MILLIGRAM(S): KIT at 05:57

## 2024-01-13 RX ADMIN — Medication 81 MILLIGRAM(S): at 12:06

## 2024-01-13 RX ADMIN — Medication 40 MILLIEQUIVALENT(S): at 15:52

## 2024-01-13 RX ADMIN — ANASTROZOLE 1 MILLIGRAM(S): 1 TABLET ORAL at 14:07

## 2024-01-13 RX ADMIN — CITALOPRAM 20 MILLIGRAM(S): 10 TABLET, FILM COATED ORAL at 12:07

## 2024-01-13 NOTE — PROGRESS NOTE ADULT - ASSESSMENT
74 y/o female with PMH HTN, CAD (6 stents; Cardiologist - Dr. Mendoza), HLD, arthritis presented to the ER c/o dizziness, epigastric abdominal pain and right hip pain. She has been feeling lightheaded for the past week and felt lightheaded at work today which prompted her to come to the ER. Pt denies chest pain, sob, LOC, vomiting, diarrhea, fevers, chills.  Pt reports 6 months of right hip pain worse the past 2 weeks.       Problem/Plan - 1:  ·  Problem: Lightheaded.   ·  Plan: Will check Orthostasis .   Cards helping.     Problem/Plan - 2:  ·  Problem: Chest pressure.   ·  Plan: Elevated trop but trending down .   Had TTE recently .  further ischemic work up per cardiology.     Problem/Plan - 3:  ·  Problem: Dilated cbd, acquired.   ·  Plan: Will get MRI .   LFT WNL .   May need advanced GI consult if  MRCP abnormal  .     Problem/Plan - 4:  ·  Problem: CAD in native artery.   ·  Plan: On ASA, Statin and R/O ACS.     Problem/Plan - 5:  ·  Problem: HLD (hyperlipidemia).   ·  Plan: Statin.     Problem/Plan - 6:  ·  Problem: HTN (hypertension).   ·  Plan: BP meds with hold parameters.

## 2024-01-13 NOTE — PROGRESS NOTE ADULT - SUBJECTIVE AND OBJECTIVE BOX
pt seen and examined, no complaints, ROS - .        amLODIPine   Tablet 5 milliGRAM(s) Oral daily  anastrozole 1 milliGRAM(s) Oral daily  aspirin enteric coated 81 milliGRAM(s) Oral daily  atorvastatin 40 milliGRAM(s) Oral at bedtime  citalopram 20 milliGRAM(s) Oral daily  hydrochlorothiazide 25 milliGRAM(s) Oral daily  potassium chloride    Tablet ER 40 milliEquivalent(s) Oral every 4 hours  rivaroxaban 2.5 milliGRAM(s) Oral two times a day  valsartan 320 milliGRAM(s) Oral daily                            11.7   6.42  )-----------( 262      ( 13 Jan 2024 05:49 )             35.2       Hemoglobin: 11.7 g/dL (01-13 @ 05:49)  Hemoglobin: 11.6 g/dL (01-12 @ 10:51)      01-13    139  |  101  |  14  ----------------------------<  85  3.4<L>   |  25  |  0.61    Ca    9.0      13 Jan 2024 05:49    TPro  6.6  /  Alb  3.3  /  TBili  0.6  /  DBili  x   /  AST  21  /  ALT  14  /  AlkPhos  106  01-13    Creatinine Trend: 0.61<--, 0.75<--    COAGS:     CARDIAC MARKERS ( 12 Jan 2024 13:03 )  x     / x     / 90 U/L / x     / 2.6 ng/mL        T(C): 36.7 (01-13-24 @ 05:57), Max: 36.8 (01-13-24 @ 00:27)  HR: 62 (01-13-24 @ 05:57) (53 - 63)  BP: 151/69 (01-13-24 @ 05:57) (125/58 - 161/61)  RR: 18 (01-13-24 @ 05:57) (18 - 20)  SpO2: 98% (01-13-24 @ 05:57) (98% - 100%)  Wt(kg): --    I&O's Summary      Gen: Appears well in NAD  HEENT:  (-)icterus (-)pallor  CV: N S1 S2 1/6 MARISABEL (+)2 Pulses B/l  Resp:  Clear to ausculatation B/L, normal effort  GI: (+) BS Soft, NT, ND  Lymph:  (-)Edema, (-)obvious lymphadenopathy  Skin: Warm to touch, Normal turgor  Psych: Appropriate mood and affect      TELEMETRY: SR PVCs	      ECG:  	SR PVCs    < from: US Abdomen Complete (US Abdomen Complete .) (01.12.24 @ 11:30) >  IMPRESSION:  Dilated CBD measuring up to 1.0 cm. Distended gallbladder without   evidence of acute cholecystitis. Recommend further evaluation with MRCP   to rule out choledocholithiasis.    < end of copied text >      < from: Xray Chest 2 Views PA/Lat (01.12.24 @ 12:55) >  IMPRESSION: Clear lungs.    < end of copied text >      ASSESSMENT/PLAN: 	73 year old Female with history of HTN, CAD hx remote RCA stent, s/p LCx stent in July of 2018, last [facility] 1/2019 with patent stents, tobacco abuse, HLD, and GERD, presented from work today with multiple complaints. She reports lightheadedness, abdominal pain, R hip pain    Cardiology consulted for hx CAD, well known to the office and elevated Trop T    #elevated Trop T  --Trend CE, add on CPK, CKMB  --pt without unstable anginal sx or acute EKG changes  --recent office echo 1/10/24 with Normal LV sys fxn  --may need repeat ischemic eval pending hospital course    #Abdominal Pain  --Abnormal US  --Work up per medicine    #CAD, hx PCI  --cont ASA, Statin  --oK to HOLD XARELTO (CAD dose) while inpatient in case of procedures    #lightheadedness  --check orthostatic vitals  --recent carotid US in the office without significant stenosis  --PT eval    #Hip Pain  --f/u imaging  --PT  --defer to medicine    further reccs pending above

## 2024-01-13 NOTE — PROGRESS NOTE ADULT - ASSESSMENT
CARDIOLOGY ATTENDING    Agree with above. Has negative CPKs with flat mildly elevated troponin - this is inconsistent with acute MI. Would get NST tomorrow. F/U with Lyandres after discharge

## 2024-01-13 NOTE — PROGRESS NOTE ADULT - SUBJECTIVE AND OBJECTIVE BOX
Date of Service  : 01-13-24     INTERVAL HPI/OVERNIGHT EVENTS: I feel fine now.   Vital Signs Last 24 Hrs  T(C): 36.9 (13 Jan 2024 11:15), Max: 36.9 (13 Jan 2024 11:15)  T(F): 98.5 (13 Jan 2024 11:15), Max: 98.5 (13 Jan 2024 11:15)  HR: 59 (13 Jan 2024 11:15) (53 - 63)  BP: 125/63 (13 Jan 2024 11:15) (125/58 - 161/61)  BP(mean): 99 (12 Jan 2024 14:47) (99 - 99)  RR: 18 (13 Jan 2024 11:15) (18 - 20)  SpO2: 97% (13 Jan 2024 11:15) (97% - 100%)    Parameters below as of 13 Jan 2024 11:15  Patient On (Oxygen Delivery Method): room air      I&O's Summary    MEDICATIONS  (STANDING):  amLODIPine   Tablet 5 milliGRAM(s) Oral daily  anastrozole 1 milliGRAM(s) Oral daily  aspirin enteric coated 81 milliGRAM(s) Oral daily  atorvastatin 40 milliGRAM(s) Oral at bedtime  citalopram 20 milliGRAM(s) Oral daily  hydrochlorothiazide 25 milliGRAM(s) Oral daily  potassium chloride    Tablet ER 40 milliEquivalent(s) Oral every 4 hours  rivaroxaban 2.5 milliGRAM(s) Oral two times a day  valsartan 320 milliGRAM(s) Oral daily    MEDICATIONS  (PRN):    LABS:                        11.7   6.42  )-----------( 262      ( 13 Jan 2024 05:49 )             35.2     01-13    139  |  101  |  14  ----------------------------<  85  3.4<L>   |  25  |  0.61    Ca    9.0      13 Jan 2024 05:49    TPro  6.6  /  Alb  3.3  /  TBili  0.6  /  DBili  x   /  AST  21  /  ALT  14  /  AlkPhos  106  01-13    PT/INR - ( 12 Jan 2024 10:51 )   PT: 13.9 sec;   INR: 1.25 ratio           Urinalysis Basic - ( 13 Jan 2024 05:49 )    Color: x / Appearance: x / SG: x / pH: x  Gluc: 85 mg/dL / Ketone: x  / Bili: x / Urobili: x   Blood: x / Protein: x / Nitrite: x   Leuk Esterase: x / RBC: x / WBC x   Sq Epi: x / Non Sq Epi: x / Bacteria: x      CAPILLARY BLOOD GLUCOSE            Urinalysis Basic - ( 13 Jan 2024 05:49 )    Color: x / Appearance: x / SG: x / pH: x  Gluc: 85 mg/dL / Ketone: x  / Bili: x / Urobili: x   Blood: x / Protein: x / Nitrite: x   Leuk Esterase: x / RBC: x / WBC x   Sq Epi: x / Non Sq Epi: x / Bacteria: x      REVIEW OF SYSTEMS:  CONSTITUTIONAL: No fever, weight loss, or fatigue  EYES: No eye pain, visual disturbances, or discharge  ENMT:  No difficulty hearing, tinnitus, vertigo; No sinus or throat pain  NECK: No pain or stiffness  RESPIRATORY: No cough, wheezing, chills or hemoptysis; No shortness of breath  CARDIOVASCULAR: No chest pain, palpitations, dizziness, or leg swelling  GASTROINTESTINAL: No abdominal or epigastric pain. No nausea, vomiting, or hematemesis; No diarrhea or constipation. No melena or hematochezia.  GENITOURINARY: No dysuria, frequency, hematuria, or incontinence  NEUROLOGICAL: No headaches, memory loss, loss of strength, numbness, or tremors      Consultant(s) Notes Reviewed:  [x ] YES  [ ] NO    PHYSICAL EXAM:  GENERAL: NAD, well-groomed, well-developed,not in any distress ,  HEAD:  Atraumatic, Normocephalic  EYES: EOMI, PERRLA, conjunctiva and sclera clear  ENMT: No tonsillar erythema, exudates, or enlargement; Moist mucous membranes, Good dentition, No lesions  NECK: Supple, No JVD, Normal thyroid  NERVOUS SYSTEM:  Alert & Oriented X3, No focal deficit   CHEST/LUNG: Good air entry bilateral with no  rales, rhonchi, wheezing, or rubs  HEART: Regular rate and rhythm; No murmurs, rubs, or gallops  ABDOMEN: Soft, Nontender, Nondistended; Bowel sounds present  EXTREMITIES:  2+ Peripheral Pulses, No clubbing, cyanosis, or edema    Care Discussed with Consultants/Other Providers [ x] YES  [ ] NO

## 2024-01-14 LAB
ANION GAP SERPL CALC-SCNC: 10 MMOL/L — SIGNIFICANT CHANGE UP (ref 7–14)
ANION GAP SERPL CALC-SCNC: 10 MMOL/L — SIGNIFICANT CHANGE UP (ref 7–14)
BUN SERPL-MCNC: 15 MG/DL — SIGNIFICANT CHANGE UP (ref 7–23)
BUN SERPL-MCNC: 15 MG/DL — SIGNIFICANT CHANGE UP (ref 7–23)
CALCIUM SERPL-MCNC: 9.3 MG/DL — SIGNIFICANT CHANGE UP (ref 8.4–10.5)
CALCIUM SERPL-MCNC: 9.3 MG/DL — SIGNIFICANT CHANGE UP (ref 8.4–10.5)
CHLORIDE SERPL-SCNC: 102 MMOL/L — SIGNIFICANT CHANGE UP (ref 98–107)
CHLORIDE SERPL-SCNC: 102 MMOL/L — SIGNIFICANT CHANGE UP (ref 98–107)
CO2 SERPL-SCNC: 25 MMOL/L — SIGNIFICANT CHANGE UP (ref 22–31)
CO2 SERPL-SCNC: 25 MMOL/L — SIGNIFICANT CHANGE UP (ref 22–31)
CREAT SERPL-MCNC: 0.61 MG/DL — SIGNIFICANT CHANGE UP (ref 0.5–1.3)
CREAT SERPL-MCNC: 0.61 MG/DL — SIGNIFICANT CHANGE UP (ref 0.5–1.3)
EGFR: 95 ML/MIN/1.73M2 — SIGNIFICANT CHANGE UP
EGFR: 95 ML/MIN/1.73M2 — SIGNIFICANT CHANGE UP
GLUCOSE SERPL-MCNC: 84 MG/DL — SIGNIFICANT CHANGE UP (ref 70–99)
GLUCOSE SERPL-MCNC: 84 MG/DL — SIGNIFICANT CHANGE UP (ref 70–99)
HCT VFR BLD CALC: 37.8 % — SIGNIFICANT CHANGE UP (ref 34.5–45)
HCT VFR BLD CALC: 37.8 % — SIGNIFICANT CHANGE UP (ref 34.5–45)
HGB BLD-MCNC: 12.5 G/DL — SIGNIFICANT CHANGE UP (ref 11.5–15.5)
HGB BLD-MCNC: 12.5 G/DL — SIGNIFICANT CHANGE UP (ref 11.5–15.5)
MAGNESIUM SERPL-MCNC: 1.7 MG/DL — SIGNIFICANT CHANGE UP (ref 1.6–2.6)
MAGNESIUM SERPL-MCNC: 1.7 MG/DL — SIGNIFICANT CHANGE UP (ref 1.6–2.6)
MCHC RBC-ENTMCNC: 32.7 PG — SIGNIFICANT CHANGE UP (ref 27–34)
MCHC RBC-ENTMCNC: 32.7 PG — SIGNIFICANT CHANGE UP (ref 27–34)
MCHC RBC-ENTMCNC: 33.1 GM/DL — SIGNIFICANT CHANGE UP (ref 32–36)
MCHC RBC-ENTMCNC: 33.1 GM/DL — SIGNIFICANT CHANGE UP (ref 32–36)
MCV RBC AUTO: 99 FL — SIGNIFICANT CHANGE UP (ref 80–100)
MCV RBC AUTO: 99 FL — SIGNIFICANT CHANGE UP (ref 80–100)
NRBC # BLD: 0 /100 WBCS — SIGNIFICANT CHANGE UP (ref 0–0)
NRBC # BLD: 0 /100 WBCS — SIGNIFICANT CHANGE UP (ref 0–0)
NRBC # FLD: 0 K/UL — SIGNIFICANT CHANGE UP (ref 0–0)
NRBC # FLD: 0 K/UL — SIGNIFICANT CHANGE UP (ref 0–0)
PHOSPHATE SERPL-MCNC: 3.1 MG/DL — SIGNIFICANT CHANGE UP (ref 2.5–4.5)
PHOSPHATE SERPL-MCNC: 3.1 MG/DL — SIGNIFICANT CHANGE UP (ref 2.5–4.5)
PLATELET # BLD AUTO: 287 K/UL — SIGNIFICANT CHANGE UP (ref 150–400)
PLATELET # BLD AUTO: 287 K/UL — SIGNIFICANT CHANGE UP (ref 150–400)
POTASSIUM SERPL-MCNC: 4.1 MMOL/L — SIGNIFICANT CHANGE UP (ref 3.5–5.3)
POTASSIUM SERPL-MCNC: 4.1 MMOL/L — SIGNIFICANT CHANGE UP (ref 3.5–5.3)
POTASSIUM SERPL-SCNC: 4.1 MMOL/L — SIGNIFICANT CHANGE UP (ref 3.5–5.3)
POTASSIUM SERPL-SCNC: 4.1 MMOL/L — SIGNIFICANT CHANGE UP (ref 3.5–5.3)
RBC # BLD: 3.82 M/UL — SIGNIFICANT CHANGE UP (ref 3.8–5.2)
RBC # BLD: 3.82 M/UL — SIGNIFICANT CHANGE UP (ref 3.8–5.2)
RBC # FLD: 13.9 % — SIGNIFICANT CHANGE UP (ref 10.3–14.5)
RBC # FLD: 13.9 % — SIGNIFICANT CHANGE UP (ref 10.3–14.5)
SODIUM SERPL-SCNC: 137 MMOL/L — SIGNIFICANT CHANGE UP (ref 135–145)
SODIUM SERPL-SCNC: 137 MMOL/L — SIGNIFICANT CHANGE UP (ref 135–145)
WBC # BLD: 7.61 K/UL — SIGNIFICANT CHANGE UP (ref 3.8–10.5)
WBC # BLD: 7.61 K/UL — SIGNIFICANT CHANGE UP (ref 3.8–10.5)
WBC # FLD AUTO: 7.61 K/UL — SIGNIFICANT CHANGE UP (ref 3.8–10.5)
WBC # FLD AUTO: 7.61 K/UL — SIGNIFICANT CHANGE UP (ref 3.8–10.5)

## 2024-01-14 RX ORDER — SODIUM CHLORIDE 9 MG/ML
1000 INJECTION INTRAMUSCULAR; INTRAVENOUS; SUBCUTANEOUS
Refills: 0 | Status: DISCONTINUED | OUTPATIENT
Start: 2024-01-14 | End: 2024-01-16

## 2024-01-14 RX ADMIN — Medication 81 MILLIGRAM(S): at 12:38

## 2024-01-14 RX ADMIN — RIVAROXABAN 2.5 MILLIGRAM(S): KIT at 06:08

## 2024-01-14 RX ADMIN — CITALOPRAM 20 MILLIGRAM(S): 10 TABLET, FILM COATED ORAL at 12:38

## 2024-01-14 RX ADMIN — ATORVASTATIN CALCIUM 40 MILLIGRAM(S): 80 TABLET, FILM COATED ORAL at 22:54

## 2024-01-14 RX ADMIN — VALSARTAN 320 MILLIGRAM(S): 80 TABLET ORAL at 06:08

## 2024-01-14 RX ADMIN — SODIUM CHLORIDE 100 MILLILITER(S): 9 INJECTION INTRAMUSCULAR; INTRAVENOUS; SUBCUTANEOUS at 12:44

## 2024-01-14 RX ADMIN — AMLODIPINE BESYLATE 5 MILLIGRAM(S): 2.5 TABLET ORAL at 06:08

## 2024-01-14 NOTE — PHYSICAL THERAPY INITIAL EVALUATION ADULT - PERTINENT HX OF CURRENT PROBLEM, REHAB EVAL
73 year old female with PMH stated below, presented to the ER complaining of dizziness, epigastric abdominal pain and right hip pain. She has been feeling lightheaded for the past week and felt lightheaded at work today which prompted her to come to the ER.

## 2024-01-14 NOTE — PROGRESS NOTE ADULT - SUBJECTIVE AND OBJECTIVE BOX
Date of Service  : 01-14-24     pt seen and examined    valsartan 320 milliGRAM(s) Oral daily      MEDICATIONS  (STANDING):  amLODIPine   Tablet 5 milliGRAM(s) Oral daily  anastrozole 1 milliGRAM(s) Oral daily  aspirin enteric coated 81 milliGRAM(s) Oral daily  atorvastatin 40 milliGRAM(s) Oral at bedtime  citalopram 20 milliGRAM(s) Oral daily  hydrochlorothiazide 25 milliGRAM(s) Oral daily  sodium chloride 0.9%. 1000 milliLiter(s) (100 mL/Hr) IV Continuous <Continuous>  valsartan 320 milliGRAM(s) Oral daily    MEDICATIONS  (PRN):    Vital Signs Last 24 Hrs  T(C): 36.6 (01-14-24 @ 18:54), Max: 37.1 (01-14-24 @ 00:05)  T(F): 97.9 (01-14-24 @ 18:54), Max: 98.8 (01-14-24 @ 00:05)  HR: 64 (01-14-24 @ 18:54) (59 - 64)  BP: 156/77 (01-14-24 @ 18:54) (122/64 - 156/77)  BP(mean): --  RR: 18 (01-14-24 @ 18:54) (16 - 18)  SpO2: 100% (01-14-24 @ 18:54) (97% - 100%)    Orthostatic VS  01-13-24 @ 17:19  Lying BP: 156/71 HR: 61  Sitting BP: 149/83 HR: 61  Standing BP: 133/83 HR: 72  Site: --  Mode: --      Urinalysis Basic - ( 13 Jan 2024 05:49 )    Color: x / Appearance: x / SG: x / pH: x  Gluc: 85 mg/dL / Ketone: x  / Bili: x / Urobili: x   Blood: x / Protein: x / Nitrite: x   Leuk Esterase: x / RBC: x / WBC x   Sq Epi: x / Non Sq Epi: x / Bacteria: x      REVIEW OF SYSTEMS:  CONSTITUTIONAL: No fever, weight loss, or fatigue  EYES: No eye pain, visual disturbances, or discharge  ENMT:  No difficulty hearing, tinnitus, vertigo; No sinus or throat pain  NECK: No pain or stiffness  RESPIRATORY: No cough, wheezing, chills or hemoptysis; No shortness of breath  CARDIOVASCULAR: No chest pain, palpitations, dizziness, or leg swelling  GASTROINTESTINAL: No abdominal or epigastric pain. No nausea, vomiting, or hematemesis; No diarrhea or constipation. No melena or hematochezia.  GENITOURINARY: No dysuria, frequency, hematuria, or incontinence  NEUROLOGICAL: No headaches, memory loss, loss of strength, numbness, or tremors      Consultant(s) Notes Reviewed:  [x ] YES  [ ] NO    PHYSICAL EXAM:  GENERAL: NAD, well-groomed, well-developed,not in any distress ,  HEAD:  Atraumatic, Normocephalic  EYES: EOMI, PERRLA, conjunctiva and sclera clear  ENMT: No tonsillar erythema, exudates, or enlargement; Moist mucous membranes, Good dentition, No lesions  NECK: Supple, No JVD, Normal thyroid  NERVOUS SYSTEM:  Alert awake  CHEST/LUNG: dec breath sounds at bases  HEART:s1, s2+  ABDOMEN: Soft, Nontender, Nondistended; Bowel sounds present  EXTREMITIES:  2+ Peripheral Pulses, No clubbing, cyanosis, or edema    Care Discussed with Consultants/Other Providers [ x] YES  [ ] NO

## 2024-01-14 NOTE — PHYSICAL THERAPY INITIAL EVALUATION ADULT - ADDITIONAL COMMENTS
Pt lives in a private house with , +4 steps to enter, resides on the main level. Pt was independent with all functional mobility without use of an assistive device. Pt lives in a private house with , +4 steps to enter, resides on the main level. Pt was independent with all functional mobility without use of an assistive device.    Pt left seated at edge of bed, all lines intact, all needs in reach, in NAD. Heart rate 83 beats per minute.

## 2024-01-14 NOTE — PROGRESS NOTE ADULT - ASSESSMENT
72 y/o female with PMH HTN, CAD (6 stents; Cardiologist - Dr. Mendoza), HLD, arthritis presented to the ER c/o dizziness, epigastric abdominal pain and right hip pain. She has been feeling lightheaded for the past week and felt lightheaded at work today which prompted her to come to the ER. Pt denies chest pain, sob, LOC, vomiting, diarrhea, fevers, chills.  Pt reports 6 months of right hip pain worse the past 2 weeks.       Problem/Plan - 1:  ·  Problem: Lightheaded.   ·  Plan: Will check Orthostasis .   Cards helping.     Problem/Plan - 2:  ·  Problem: Chest pressure.   ·  Plan: Elevated trop but trending down .   Had TTE recently .  further ischemic work up per cardiology.     Problem/Plan - 3:  ·  Problem: Dilated cbd, acquired.   ·  Plan: Will get MRI .   LFT WNL .   May need advanced GI consult if  MRCP abnormal  .     Problem/Plan - 4:  ·  Problem: CAD in native artery.   ·  Plan: On ASA, Statin and R/O ACS.     Problem/Plan - 5:  ·  Problem: HLD (hyperlipidemia).   ·  Plan: Statin.     Problem/Plan - 6:  ·  Problem: HTN (hypertension).   ·  Plan: BP meds with hold parameters.     74 y/o female with PMH HTN, CAD (6 stents; Cardiologist - Dr. Mendoza), HLD, arthritis presented to the ER c/o dizziness, epigastric abdominal pain and right hip pain. She has been feeling lightheaded for the past week and felt lightheaded at work today which prompted her to come to the ER. Pt denies chest pain, sob, LOC, vomiting, diarrhea, fevers, chills.  Pt reports 6 months of right hip pain worse the past 2 weeks.       Problem/Plan - 1:  ·  Problem: Lightheaded.   ·  Plan: Will check Orthostasis .   Cards helping.     Problem/Plan - 2:  ·  Problem: Chest pressure.   ·  Plan: Elevated trop but trending down .   Had TTE recently .  further ischemic work up per cardiology.     Problem/Plan - 3:  ·  Problem: Dilated cbd, acquired.   ·  Plan: Will get MRI .   LFT WNL .   May need advanced GI consult if  MRCP abnormal  .     Problem/Plan - 4:  ·  Problem: CAD in native artery.   ·  Plan: On ASA, Statin and R/O ACS.     Problem/Plan - 5:  ·  Problem: HLD (hyperlipidemia).   ·  Plan: Statin.     Problem/Plan - 6:  ·  Problem: HTN (hypertension).   ·  Plan: BP meds with hold parameters.

## 2024-01-14 NOTE — PHYSICAL THERAPY INITIAL EVALUATION ADULT - GENERAL OBSERVATIONS, REHAB EVAL
Pt received standing at bedside, +telemetry, all lines intact, in NAD. Pt agreeable to participate in PT evaluation.

## 2024-01-14 NOTE — PHYSICAL THERAPY INITIAL EVALUATION ADULT - NSPTDISCHREC_GEN_A_CORE
Pt. not placed on skilled therapy services secondary to pt. performing at their baseline functional mobility performance. Discharge to home with skilled Outpatient PT services./Outpatient PT

## 2024-01-14 NOTE — PROGRESS NOTE ADULT - SUBJECTIVE AND OBJECTIVE BOX
Spoke w/ pt and let him know that his iron level has dropped a little and Dr. Cortez would like to have his labs drawn and follow up with him in 3 months instead of 6 months.  Pt rescheduled for March and v/u.   Date of service 1/14/24    extended hpi: 73 year old Female with history of HTN, CAD hx remote RCA stent, s/p LCx stent in July of 2018, last C 1/2019 with patent stents, tobacco abuse, HLD, and GERD, presented from work today with multiple complaints. She reports lightheadedness, abdominal pain, R hip pain    no chest pain, SOB      Review of Systems:   Constitutional: [ ] fevers, [ ] chills.   Skin: [ ] dry skin. [ ] rashes.  Psychiatric: [ ] depression, [ ] anxiety.   Gastrointestinal: [ ] BRBPR, [ ] melena.   Neurological: [ ] confusion. [ ] seizures. [ ] shuffling gait.   Ears,Nose,Mouth and Throat: [ ] ear pain [ ] sore throat.   Eyes: [ ] diplopia.   Respiratory: [ ] hemoptysis. [ ] shortness of breath  Cardiovascular: See HPI above  Hematologic/Lymphatic: [ ] anemia. [ ] painful nodes. [ ] prolonged bleeding.   Genitourinary: [ ] hematuria. [ ] flank pain.   Endocrine: [ ] significant change in weight. [ ] intolerance to heat and cold.     Review of systems [x ] otherwise negative, [ ] otherwise unable to obtain    FH: no family history of sudden cardiac death in first degree relatives    SH: [ ] tobacco, [ ] alcohol, [ ] drugs    amLODIPine   Tablet 5 milliGRAM(s) Oral daily  anastrozole 1 milliGRAM(s) Oral daily  aspirin enteric coated 81 milliGRAM(s) Oral daily  atorvastatin 40 milliGRAM(s) Oral at bedtime  citalopram 20 milliGRAM(s) Oral daily  hydrochlorothiazide 25 milliGRAM(s) Oral daily  sodium chloride 0.9%. 1000 milliLiter(s) IV Continuous <Continuous>  valsartan 320 milliGRAM(s) Oral daily                            12.5   7.61  )-----------( 287      ( 14 Jan 2024 07:11 )             37.8     137  |  102  |  15  ----------------------------<  84  4.1   |  25  |  0.61    Ca    9.3      14 Jan 2024 07:11  Phos  3.1     01-14  Mg     1.70     01-14    TPro  6.6  /  Alb  3.3  /  TBili  0.6  /  DBili  x   /  AST  21  /  ALT  14  /  AlkPhos  106  01-13      CARDIAC MARKERS ( 12 Jan 2024 13:03 )  x     / x     / 90 U/L / x     / 2.6 ng/mL        T(C): 36.9 (01-14-24 @ 13:30), Max: 37.1 (01-14-24 @ 00:05)  HR: 59 (01-14-24 @ 13:30) (59 - 64)  BP: 143/69 (01-14-24 @ 13:30) (122/64 - 143/69)  RR: 17 (01-14-24 @ 13:30) (16 - 18)  SpO2: 98% (01-14-24 @ 13:30) (97% - 100%)  Wt(kg): --    I&O's Summary      General: Well nourished in no acute distress. Alert and Oriented * 3.   Head: Normocephalic and atraumatic.   Neck: No JVD. No bruits. Supple. Does not appear to be enlarged.   Cardiovascular: + S1,S2 ; RRR Soft systolic murmur at the left lower sternal border. No rubs noted.    Lungs: CTA b/l. No rhonchi, rales or wheezes.   Abdomen: + BS, soft. Non tender. Non distended. No rebound. No guarding.   Extremities: No clubbing/cyanosis/edema.   Neurologic: Moves all four extremities. Full range of motion.   Skin: Warm and moist. The patient's skin has normal elasticity and good skin turgor.   Psychiatric: Appropriate mood and affect.  Musculoskeletal: Normal range of motion, normal strength    DATA    TELEMETRY: SR PVCs	      ECG:  	SR PVCs    < from: US Abdomen Complete (US Abdomen Complete .) (01.12.24 @ 11:30) >  IMPRESSION:  Dilated CBD measuring up to 1.0 cm. Distended gallbladder without   evidence of acute cholecystitis. Recommend further evaluation with MRCP   to rule out choledocholithiasis.    < end of copied text >      < from: Xray Chest 2 Views PA/Lat (01.12.24 @ 12:55) >  IMPRESSION: Clear lungs.    < end of copied text >      ASSESSMENT/PLAN: 	73 year old Female with history of HTN, CAD hx remote RCA stent, s/p LCx stent in July of 2018, last [facility] 1/2019 with patent stents, tobacco abuse, HLD, and GERD, presented from work today with multiple complaints. She reports lightheadedness, abdominal pain, R hip pain    Cardiology consulted for hx CAD, well known to the office and elevated Trop T    #elevated Trop T  --Trend CE, add on CPK, CKMB  --pt without unstable anginal sx or acute EKG changes  --recent office echo 1/10/24 with Normal LV sys fxn  --may need repeat ischemic eval pending hospital course--if no further GI w/u planned, check NST    #Abdominal Pain  --Abnormal US  --Work up per medicine    #CAD, hx PCI  --cont ASA, Statin  --oK to HOLD XARELTO (CAD dose) while inpatient in case of procedures    #lightheadedness  --orthostatic vitals +, start IVF  --recent carotid US in the office without significant stenosis  --PT eval    #Hip Pain  --f/u imaging  --PT  --defer to medicine                       Date of service 1/14/24    extended hpi: 73 year old Female with history of HTN, CAD hx remote RCA stent, s/p LCx stent in July of 2018, last C 1/2019 with patent stents, tobacco abuse, HLD, and GERD, presented from work today with multiple complaints. She reports lightheadedness, abdominal pain, R hip pain    no chest pain, SOB      Review of Systems:   Constitutional: [ ] fevers, [ ] chills.   Skin: [ ] dry skin. [ ] rashes.  Psychiatric: [ ] depression, [ ] anxiety.   Gastrointestinal: [ ] BRBPR, [ ] melena.   Neurological: [ ] confusion. [ ] seizures. [ ] shuffling gait.   Ears,Nose,Mouth and Throat: [ ] ear pain [ ] sore throat.   Eyes: [ ] diplopia.   Respiratory: [ ] hemoptysis. [ ] shortness of breath  Cardiovascular: See HPI above  Hematologic/Lymphatic: [ ] anemia. [ ] painful nodes. [ ] prolonged bleeding.   Genitourinary: [ ] hematuria. [ ] flank pain.   Endocrine: [ ] significant change in weight. [ ] intolerance to heat and cold.     Review of systems [x ] otherwise negative, [ ] otherwise unable to obtain    FH: no family history of sudden cardiac death in first degree relatives    SH: [ ] tobacco, [ ] alcohol, [ ] drugs    amLODIPine   Tablet 5 milliGRAM(s) Oral daily  anastrozole 1 milliGRAM(s) Oral daily  aspirin enteric coated 81 milliGRAM(s) Oral daily  atorvastatin 40 milliGRAM(s) Oral at bedtime  citalopram 20 milliGRAM(s) Oral daily  hydrochlorothiazide 25 milliGRAM(s) Oral daily  sodium chloride 0.9%. 1000 milliLiter(s) IV Continuous <Continuous>  valsartan 320 milliGRAM(s) Oral daily                            12.5   7.61  )-----------( 287      ( 14 Jan 2024 07:11 )             37.8     137  |  102  |  15  ----------------------------<  84  4.1   |  25  |  0.61    Ca    9.3      14 Jan 2024 07:11  Phos  3.1     01-14  Mg     1.70     01-14    TPro  6.6  /  Alb  3.3  /  TBili  0.6  /  DBili  x   /  AST  21  /  ALT  14  /  AlkPhos  106  01-13      CARDIAC MARKERS ( 12 Jan 2024 13:03 )  x     / x     / 90 U/L / x     / 2.6 ng/mL        T(C): 36.9 (01-14-24 @ 13:30), Max: 37.1 (01-14-24 @ 00:05)  HR: 59 (01-14-24 @ 13:30) (59 - 64)  BP: 143/69 (01-14-24 @ 13:30) (122/64 - 143/69)  RR: 17 (01-14-24 @ 13:30) (16 - 18)  SpO2: 98% (01-14-24 @ 13:30) (97% - 100%)  Wt(kg): --    I&O's Summary      General: Well nourished in no acute distress. Alert and Oriented * 3.   Head: Normocephalic and atraumatic.   Neck: No JVD. No bruits. Supple. Does not appear to be enlarged.   Cardiovascular: + S1,S2 ; RRR Soft systolic murmur at the left lower sternal border. No rubs noted.    Lungs: CTA b/l. No rhonchi, rales or wheezes.   Abdomen: + BS, soft. Non tender. Non distended. No rebound. No guarding.   Extremities: No clubbing/cyanosis/edema.   Neurologic: Moves all four extremities. Full range of motion.   Skin: Warm and moist. The patient's skin has normal elasticity and good skin turgor.   Psychiatric: Appropriate mood and affect.  Musculoskeletal: Normal range of motion, normal strength    DATA    TELEMETRY: SR PVCs	      ECG:  	SR PVCs    < from: US Abdomen Complete (US Abdomen Complete .) (01.12.24 @ 11:30) >  IMPRESSION:  Dilated CBD measuring up to 1.0 cm. Distended gallbladder without   evidence of acute cholecystitis. Recommend further evaluation with MRCP   to rule out choledocholithiasis.    < end of copied text >      < from: Xray Chest 2 Views PA/Lat (01.12.24 @ 12:55) >  IMPRESSION: Clear lungs.    < end of copied text >      ASSESSMENT/PLAN: 	73 year old Female with history of HTN, CAD hx remote RCA stent, s/p LCx stent in July of 2018, last [facility] 1/2019 with patent stents, tobacco abuse, HLD, and GERD, presented from work today with multiple complaints. She reports lightheadedness, abdominal pain, R hip pain    Cardiology consulted for hx CAD, well known to the office and elevated Trop T    #elevated Trop T  --Trend CE, add on CPK, CKMB  --pt without unstable anginal sx or acute EKG changes  --recent office echo 1/10/24 with Normal LV sys fxn  --may need repeat ischemic eval pending hospital course--if no further GI w/u planned, check NST    #Abdominal Pain  --Abnormal US  --Work up per medicine    #CAD, hx PCI  --cont ASA, Statin  --oK to HOLD XARELTO (CAD dose) while inpatient in case of procedures    #lightheadedness  --orthostatic vitals +, start IVF  --recent carotid US in the office without significant stenosis  --PT eval    #Hip Pain  --f/u imaging  --PT  --defer to medicine

## 2024-01-15 LAB
ANION GAP SERPL CALC-SCNC: 9 MMOL/L — SIGNIFICANT CHANGE UP (ref 7–14)
ANION GAP SERPL CALC-SCNC: 9 MMOL/L — SIGNIFICANT CHANGE UP (ref 7–14)
BUN SERPL-MCNC: 16 MG/DL — SIGNIFICANT CHANGE UP (ref 7–23)
BUN SERPL-MCNC: 16 MG/DL — SIGNIFICANT CHANGE UP (ref 7–23)
CALCIUM SERPL-MCNC: 9.1 MG/DL — SIGNIFICANT CHANGE UP (ref 8.4–10.5)
CALCIUM SERPL-MCNC: 9.1 MG/DL — SIGNIFICANT CHANGE UP (ref 8.4–10.5)
CHLORIDE SERPL-SCNC: 104 MMOL/L — SIGNIFICANT CHANGE UP (ref 98–107)
CHLORIDE SERPL-SCNC: 104 MMOL/L — SIGNIFICANT CHANGE UP (ref 98–107)
CO2 SERPL-SCNC: 27 MMOL/L — SIGNIFICANT CHANGE UP (ref 22–31)
CO2 SERPL-SCNC: 27 MMOL/L — SIGNIFICANT CHANGE UP (ref 22–31)
CREAT SERPL-MCNC: 0.69 MG/DL — SIGNIFICANT CHANGE UP (ref 0.5–1.3)
CREAT SERPL-MCNC: 0.69 MG/DL — SIGNIFICANT CHANGE UP (ref 0.5–1.3)
EGFR: 92 ML/MIN/1.73M2 — SIGNIFICANT CHANGE UP
EGFR: 92 ML/MIN/1.73M2 — SIGNIFICANT CHANGE UP
GLUCOSE SERPL-MCNC: 87 MG/DL — SIGNIFICANT CHANGE UP (ref 70–99)
GLUCOSE SERPL-MCNC: 87 MG/DL — SIGNIFICANT CHANGE UP (ref 70–99)
HCT VFR BLD CALC: 34.9 % — SIGNIFICANT CHANGE UP (ref 34.5–45)
HCT VFR BLD CALC: 34.9 % — SIGNIFICANT CHANGE UP (ref 34.5–45)
HGB BLD-MCNC: 11.6 G/DL — SIGNIFICANT CHANGE UP (ref 11.5–15.5)
HGB BLD-MCNC: 11.6 G/DL — SIGNIFICANT CHANGE UP (ref 11.5–15.5)
MAGNESIUM SERPL-MCNC: 1.7 MG/DL — SIGNIFICANT CHANGE UP (ref 1.6–2.6)
MAGNESIUM SERPL-MCNC: 1.7 MG/DL — SIGNIFICANT CHANGE UP (ref 1.6–2.6)
MCHC RBC-ENTMCNC: 32.9 PG — SIGNIFICANT CHANGE UP (ref 27–34)
MCHC RBC-ENTMCNC: 32.9 PG — SIGNIFICANT CHANGE UP (ref 27–34)
MCHC RBC-ENTMCNC: 33.2 GM/DL — SIGNIFICANT CHANGE UP (ref 32–36)
MCHC RBC-ENTMCNC: 33.2 GM/DL — SIGNIFICANT CHANGE UP (ref 32–36)
MCV RBC AUTO: 98.9 FL — SIGNIFICANT CHANGE UP (ref 80–100)
MCV RBC AUTO: 98.9 FL — SIGNIFICANT CHANGE UP (ref 80–100)
NRBC # BLD: 0 /100 WBCS — SIGNIFICANT CHANGE UP (ref 0–0)
NRBC # BLD: 0 /100 WBCS — SIGNIFICANT CHANGE UP (ref 0–0)
NRBC # FLD: 0 K/UL — SIGNIFICANT CHANGE UP (ref 0–0)
NRBC # FLD: 0 K/UL — SIGNIFICANT CHANGE UP (ref 0–0)
PHOSPHATE SERPL-MCNC: 3.7 MG/DL — SIGNIFICANT CHANGE UP (ref 2.5–4.5)
PHOSPHATE SERPL-MCNC: 3.7 MG/DL — SIGNIFICANT CHANGE UP (ref 2.5–4.5)
PLATELET # BLD AUTO: 246 K/UL — SIGNIFICANT CHANGE UP (ref 150–400)
PLATELET # BLD AUTO: 246 K/UL — SIGNIFICANT CHANGE UP (ref 150–400)
POTASSIUM SERPL-MCNC: 4.3 MMOL/L — SIGNIFICANT CHANGE UP (ref 3.5–5.3)
POTASSIUM SERPL-MCNC: 4.3 MMOL/L — SIGNIFICANT CHANGE UP (ref 3.5–5.3)
POTASSIUM SERPL-SCNC: 4.3 MMOL/L — SIGNIFICANT CHANGE UP (ref 3.5–5.3)
POTASSIUM SERPL-SCNC: 4.3 MMOL/L — SIGNIFICANT CHANGE UP (ref 3.5–5.3)
RBC # BLD: 3.53 M/UL — LOW (ref 3.8–5.2)
RBC # BLD: 3.53 M/UL — LOW (ref 3.8–5.2)
RBC # FLD: 13.7 % — SIGNIFICANT CHANGE UP (ref 10.3–14.5)
RBC # FLD: 13.7 % — SIGNIFICANT CHANGE UP (ref 10.3–14.5)
SODIUM SERPL-SCNC: 140 MMOL/L — SIGNIFICANT CHANGE UP (ref 135–145)
SODIUM SERPL-SCNC: 140 MMOL/L — SIGNIFICANT CHANGE UP (ref 135–145)
WBC # BLD: 7.07 K/UL — SIGNIFICANT CHANGE UP (ref 3.8–10.5)
WBC # BLD: 7.07 K/UL — SIGNIFICANT CHANGE UP (ref 3.8–10.5)
WBC # FLD AUTO: 7.07 K/UL — SIGNIFICANT CHANGE UP (ref 3.8–10.5)
WBC # FLD AUTO: 7.07 K/UL — SIGNIFICANT CHANGE UP (ref 3.8–10.5)

## 2024-01-15 RX ORDER — SIMETHICONE 80 MG/1
80 TABLET, CHEWABLE ORAL ONCE
Refills: 0 | Status: COMPLETED | OUTPATIENT
Start: 2024-01-15 | End: 2024-01-15

## 2024-01-15 RX ADMIN — CITALOPRAM 20 MILLIGRAM(S): 10 TABLET, FILM COATED ORAL at 12:20

## 2024-01-15 RX ADMIN — ATORVASTATIN CALCIUM 40 MILLIGRAM(S): 80 TABLET, FILM COATED ORAL at 22:59

## 2024-01-15 RX ADMIN — VALSARTAN 320 MILLIGRAM(S): 80 TABLET ORAL at 05:08

## 2024-01-15 RX ADMIN — AMLODIPINE BESYLATE 5 MILLIGRAM(S): 2.5 TABLET ORAL at 05:07

## 2024-01-15 RX ADMIN — ANASTROZOLE 1 MILLIGRAM(S): 1 TABLET ORAL at 12:20

## 2024-01-15 RX ADMIN — Medication 81 MILLIGRAM(S): at 12:20

## 2024-01-15 NOTE — PROGRESS NOTE ADULT - SUBJECTIVE AND OBJECTIVE BOX
Date of service 1/15/24    extended hpi: 73 year old Female with history of HTN, CAD hx remote RCA stent, s/p LCx stent in July of 2018, last C 1/2019 with patent stents, tobacco abuse, HLD, and GERD, presented from work today with multiple complaints. She reports lightheadedness, abdominal pain, R hip pain    Patient denies chest pain or shortness of breath. Reports abdominal discomfort and early satiety after eating.  Review of symptoms otherwise negative.    MEDICATIONS:  amLODIPine   Tablet 5 milliGRAM(s) Oral daily  anastrozole 1 milliGRAM(s) Oral daily  aspirin enteric coated 81 milliGRAM(s) Oral daily  atorvastatin 40 milliGRAM(s) Oral at bedtime  citalopram 20 milliGRAM(s) Oral daily  hydrochlorothiazide 25 milliGRAM(s) Oral daily  sodium chloride 0.9%. 1000 milliLiter(s) IV Continuous <Continuous>  valsartan 320 milliGRAM(s) Oral daily    LABS:                        11.6   7.07  )-----------( 246      ( 15 Han 2024 05:01 )             34.9     Hemoglobin: 11.6 g/dL (01-15 @ 05:01)  Hemoglobin: 12.5 g/dL (01-14 @ 07:11)  Hemoglobin: 11.7 g/dL (01-13 @ 05:49)  Hemoglobin: 11.6 g/dL (01-12 @ 10:51)      01-15    140  |  104  |  16  ----------------------------<  87  4.3   |  27  |  0.69    Ca    9.1      15 Han 2024 05:01  Phos  3.7     01-15  Mg     1.70     01-15      Creatinine Trend: 0.69<--, 0.61<--, 0.61<--, 0.75<--    COAGS:     CARDIAC MARKERS ( 12 Jan 2024 13:03 )  x     / x     / 90 U/L / x     / 2.6 ng/mL    PHYSICAL EXAM:  T(C): 36.3 (01-15-24 @ 05:55), Max: 36.9 (01-14-24 @ 13:30)  HR: 58 (01-15-24 @ 05:55) (50 - 64)  BP: 160/75 (01-15-24 @ 05:55) (143/69 - 160/75)  RR: 19 (01-15-24 @ 05:55) (17 - 19)  SpO2: 97% (01-15-24 @ 05:55) (97% - 100%)  Wt(kg): --    I&O's Summary      General: Alert and Oriented * 3.   Head: Normocephalic and atraumatic.   Neck: No JVD. No bruits. Supple. Does not appear to be enlarged.   Cardiovascular: + S1,S2 ; RRR Soft systolic murmur at the left lower sternal border. No rubs noted.    Lungs: CTA b/l. No rhonchi, rales or wheezes.   Abdomen: + BS, soft. Non tender. Non distended. No rebound. No guarding.   Extremities: No clubbing/cyanosis/edema.   Neurologic: Moves all four extremities. Full range of motion.   Skin: Warm and moist. The patient's skin has normal elasticity and good skin turgor.   Psychiatric: Appropriate mood and affect.  Musculoskeletal: Normal range of motion, normal strength    DATA    TELEMETRY: SR PVCs	      ECG:  	SR PVCs    < from: US Abdomen Complete (US Abdomen Complete .) (01.12.24 @ 11:30) >  IMPRESSION:  Dilated CBD measuring up to 1.0 cm. Distended gallbladder without   evidence of acute cholecystitis. Recommend further evaluation with MRCP   to rule out choledocholithiasis.    < end of copied text >      < from: Xray Chest 2 Views PA/Lat (01.12.24 @ 12:55) >  IMPRESSION: Clear lungs.    < end of copied text >      ASSESSMENT/PLAN: 	73 year old Female with history of HTN, CAD hx remote RCA stent, s/p LCx stent in July of 2018, last LHC 1/2019 with patent stents, tobacco abuse, HLD, and GERD, presented from work today with multiple complaints. She reports lightheadedness, abdominal pain, R hip pain    Cardiology consulted for hx CAD, well known to the office and elevated Trop T    #elevated Trop T  --mildly elevated with negative CK  --pt without unstable anginal sx or acute EKG changes  --recent office echo 1/10/24 with Normal LV sys fxn    #Abdominal Pain  --Abnormal US  --Work up per medicine/GI  -- reports symptoms after food intake, MRCP noted f/u GI recs, could this be mesenteric ischemia?    #CAD, hx PCI  --cont ASA, Statin    #lightheadedness  --orthostatic vitals +, start IVF  -- reports decreased PO intake  --recent carotid US in the office without significant stenosis  --PT chris Marquez MD  Pager: [phone]                      Date of service 1/15/24    extended hpi: 73 year old Female with history of HTN, CAD hx remote RCA stent, s/p LCx stent in July of 2018, last C 1/2019 with patent stents, tobacco abuse, HLD, and GERD, presented from work today with multiple complaints. She reports lightheadedness, abdominal pain, R hip pain    Patient denies chest pain or shortness of breath. Reports abdominal discomfort and early satiety after eating.  Review of symptoms otherwise negative.    MEDICATIONS:  amLODIPine   Tablet 5 milliGRAM(s) Oral daily  anastrozole 1 milliGRAM(s) Oral daily  aspirin enteric coated 81 milliGRAM(s) Oral daily  atorvastatin 40 milliGRAM(s) Oral at bedtime  citalopram 20 milliGRAM(s) Oral daily  hydrochlorothiazide 25 milliGRAM(s) Oral daily  sodium chloride 0.9%. 1000 milliLiter(s) IV Continuous <Continuous>  valsartan 320 milliGRAM(s) Oral daily    LABS:                        11.6   7.07  )-----------( 246      ( 15 Han 2024 05:01 )             34.9     Hemoglobin: 11.6 g/dL (01-15 @ 05:01)  Hemoglobin: 12.5 g/dL (01-14 @ 07:11)  Hemoglobin: 11.7 g/dL (01-13 @ 05:49)  Hemoglobin: 11.6 g/dL (01-12 @ 10:51)      01-15    140  |  104  |  16  ----------------------------<  87  4.3   |  27  |  0.69    Ca    9.1      15 Han 2024 05:01  Phos  3.7     01-15  Mg     1.70     01-15      Creatinine Trend: 0.69<--, 0.61<--, 0.61<--, 0.75<--    COAGS:     CARDIAC MARKERS ( 12 Jan 2024 13:03 )  x     / x     / 90 U/L / x     / 2.6 ng/mL    PHYSICAL EXAM:  T(C): 36.3 (01-15-24 @ 05:55), Max: 36.9 (01-14-24 @ 13:30)  HR: 58 (01-15-24 @ 05:55) (50 - 64)  BP: 160/75 (01-15-24 @ 05:55) (143/69 - 160/75)  RR: 19 (01-15-24 @ 05:55) (17 - 19)  SpO2: 97% (01-15-24 @ 05:55) (97% - 100%)  Wt(kg): --    I&O's Summary      General: Alert and Oriented * 3.   Head: Normocephalic and atraumatic.   Neck: No JVD. No bruits. Supple. Does not appear to be enlarged.   Cardiovascular: + S1,S2 ; RRR Soft systolic murmur at the left lower sternal border. No rubs noted.    Lungs: CTA b/l. No rhonchi, rales or wheezes.   Abdomen: + BS, soft. Non tender. Non distended. No rebound. No guarding.   Extremities: No clubbing/cyanosis/edema.   Neurologic: Moves all four extremities. Full range of motion.   Skin: Warm and moist. The patient's skin has normal elasticity and good skin turgor.   Psychiatric: Appropriate mood and affect.  Musculoskeletal: Normal range of motion, normal strength    DATA    TELEMETRY: SR PVCs	      ECG:  	SR PVCs    < from: US Abdomen Complete (US Abdomen Complete .) (01.12.24 @ 11:30) >  IMPRESSION:  Dilated CBD measuring up to 1.0 cm. Distended gallbladder without   evidence of acute cholecystitis. Recommend further evaluation with MRCP   to rule out choledocholithiasis.    < end of copied text >      < from: Xray Chest 2 Views PA/Lat (01.12.24 @ 12:55) >  IMPRESSION: Clear lungs.    < end of copied text >      ASSESSMENT/PLAN: 	73 year old Female with history of HTN, CAD hx remote RCA stent, s/p LCx stent in July of 2018, last LHC 1/2019 with patent stents, tobacco abuse, HLD, and GERD, presented from work today with multiple complaints. She reports lightheadedness, abdominal pain, R hip pain    Cardiology consulted for hx CAD, well known to the office and elevated Trop T    #elevated Trop T  --mildly elevated with negative CK  --pt without unstable anginal sx or acute EKG changes  --recent office echo 1/10/24 with Normal LV sys fxn    #Abdominal Pain  --Abnormal US  --Work up per medicine/GI  -- reports symptoms after food intake, MRCP noted f/u GI recs, could this be mesenteric ischemia?    #CAD, hx PCI  --cont ASA, Statin    #lightheadedness  --orthostatic vitals +, start IVF  -- reports decreased PO intake  --recent carotid US in the office without significant stenosis  --PT chris Marquez MD  Pager: [phone]                      Date of service 1/15/24    extended hpi: 73 year old Female with history of HTN, CAD hx remote RCA stent, s/p LCx stent in July of 2018, last C 1/2019 with patent stents, tobacco abuse, HLD, and GERD, presented from work today with multiple complaints. She reports lightheadedness, abdominal pain, R hip pain    Patient denies chest pain or shortness of breath. Reports abdominal discomfort and early satiety after eating.  Review of symptoms otherwise negative.    MEDICATIONS:  amLODIPine   Tablet 5 milliGRAM(s) Oral daily  anastrozole 1 milliGRAM(s) Oral daily  aspirin enteric coated 81 milliGRAM(s) Oral daily  atorvastatin 40 milliGRAM(s) Oral at bedtime  citalopram 20 milliGRAM(s) Oral daily  hydrochlorothiazide 25 milliGRAM(s) Oral daily  sodium chloride 0.9%. 1000 milliLiter(s) IV Continuous <Continuous>  valsartan 320 milliGRAM(s) Oral daily    LABS:                        11.6   7.07  )-----------( 246      ( 15 Han 2024 05:01 )             34.9     Hemoglobin: 11.6 g/dL (01-15 @ 05:01)  Hemoglobin: 12.5 g/dL (01-14 @ 07:11)  Hemoglobin: 11.7 g/dL (01-13 @ 05:49)  Hemoglobin: 11.6 g/dL (01-12 @ 10:51)      01-15    140  |  104  |  16  ----------------------------<  87  4.3   |  27  |  0.69    Ca    9.1      15 Han 2024 05:01  Phos  3.7     01-15  Mg     1.70     01-15      Creatinine Trend: 0.69<--, 0.61<--, 0.61<--, 0.75<--    COAGS:     CARDIAC MARKERS ( 12 Jan 2024 13:03 )  x     / x     / 90 U/L / x     / 2.6 ng/mL    PHYSICAL EXAM:  T(C): 36.3 (01-15-24 @ 05:55), Max: 36.9 (01-14-24 @ 13:30)  HR: 58 (01-15-24 @ 05:55) (50 - 64)  BP: 160/75 (01-15-24 @ 05:55) (143/69 - 160/75)  RR: 19 (01-15-24 @ 05:55) (17 - 19)  SpO2: 97% (01-15-24 @ 05:55) (97% - 100%)  Wt(kg): --    I&O's Summary      General: Alert and Oriented * 3.   Head: Normocephalic and atraumatic.   Neck: No JVD. No bruits. Supple. Does not appear to be enlarged.   Cardiovascular: + S1,S2 ; RRR Soft systolic murmur at the left lower sternal border. No rubs noted.    Lungs: CTA b/l. No rhonchi, rales or wheezes.   Abdomen: + BS, soft. Non tender. Non distended. No rebound. No guarding.   Extremities: No clubbing/cyanosis/edema.   Neurologic: Moves all four extremities. Full range of motion.   Skin: Warm and moist. The patient's skin has normal elasticity and good skin turgor.   Psychiatric: Appropriate mood and affect.  Musculoskeletal: Normal range of motion, normal strength    DATA    TELEMETRY: SR PVCs	      ECG:  	SR PVCs    < from: US Abdomen Complete (US Abdomen Complete .) (01.12.24 @ 11:30) >  IMPRESSION:  Dilated CBD measuring up to 1.0 cm. Distended gallbladder without   evidence of acute cholecystitis. Recommend further evaluation with MRCP   to rule out choledocholithiasis.    < end of copied text >      < from: Xray Chest 2 Views PA/Lat (01.12.24 @ 12:55) >  IMPRESSION: Clear lungs.    < end of copied text >      ASSESSMENT/PLAN: 	73 year old Female with history of HTN, CAD hx remote RCA stent, s/p LCx stent in July of 2018, last LHC 1/2019 with patent stents, tobacco abuse, HLD, and GERD, presented from work today with multiple complaints. She reports lightheadedness, abdominal pain, R hip pain    Cardiology consulted for hx CAD, well known to the office and elevated Trop T    #elevated Trop T  --mildly elevated with negative CK  --pt without unstable anginal sx or acute EKG changes  --recent office echo 1/10/24 with Normal LV sys fxn    #Abdominal Pain  --Abnormal US  --Work up per medicine/GI  -- reports symptoms after food intake, MRCP noted f/u GI recs, could this be mesenteric ischemia?    #CAD, hx PCI  --cont ASA, Statin    #lightheadedness  --orthostatic vitals +, start IVF  -- reports decreased PO intake  --recent carotid US in the office without significant stenosis  --PT chris Marquez MD  Pager: [phone]

## 2024-01-15 NOTE — PROGRESS NOTE ADULT - SUBJECTIVE AND OBJECTIVE BOX
MARTITA [id number]  73yFemale  T(C): 36.3 (01-15-24 @ 05:55), Max: 36.9 (01-14-24 @ 13:30)  HR: 58 (01-15-24 @ 05:55) (50 - 64)  BP: 160/75 (01-15-24 @ 05:55) (143/69 - 160/75)  RR: 19 (01-15-24 @ 05:55) (17 - 19)  SpO2: 97% (01-15-24 @ 05:55) (97% - 100%)  Wt(kg): --  normal cephalic atraumatic  s1s2   clear to ascultation bilaterally  soft, non tender, non distended no guarding or rebound  no clubbing cyanosis or edema    chart reviwed, full consult to follow     MARTITA [id number]  73yFemale  T(C): 36.3 (01-15-24 @ 05:55), Max: 36.9 (01-14-24 @ 13:30)  HR: 58 (01-15-24 @ 05:55) (50 - 64)  BP: 160/75 (01-15-24 @ 05:55) (143/69 - 160/75)  RR: 19 (01-15-24 @ 05:55) (17 - 19)  SpO2: 97% (01-15-24 @ 05:55) (97% - 100%)  Wt(kg): --  normal cephalic atraumatic  s1s2   clear to ascultation bilaterally  soft, non tender, non distended no guarding or rebound  no clubbing cyanosis or edema    chart reviwed, full consult to follow     MARTITA [id number]  73yFemale  T(C): 36.3 (01-15-24 @ 05:55), Max: 36.9 (01-14-24 @ 13:30)  HR: 58 (01-15-24 @ 05:55) (50 - 64)  BP: 160/75 (01-15-24 @ 05:55) (143/69 - 160/75)  RR: 19 (01-15-24 @ 05:55) (17 - 19)  SpO2: 97% (01-15-24 @ 05:55) (97% - 100%)  Wt(kg): --  normal cephalic atraumatic  s1s2   clear to ascultation bilaterally  soft, non tender, non distended no guarding or rebound  no clubbing cyanosis or edema    chart reviwed, full consult to follow

## 2024-01-15 NOTE — PATIENT PROFILE ADULT - STATED REASON FOR ADMISSION
72 y/o female with PMH HTN, CAD (6 stents; Cardiologist - Dr. Mendoza), HLD, arthritis presented to the ER c/o dizziness, epigastric abdominal pain and right hip pain.

## 2024-01-15 NOTE — PROGRESS NOTE ADULT - SUBJECTIVE AND OBJECTIVE BOX
Date of Service  : 01-15-24     pt seen and examined    MEDICATIONS  (STANDING):  amLODIPine   Tablet 5 milliGRAM(s) Oral daily  anastrozole 1 milliGRAM(s) Oral daily  aspirin enteric coated 81 milliGRAM(s) Oral daily  atorvastatin 40 milliGRAM(s) Oral at bedtime  citalopram 20 milliGRAM(s) Oral daily  hydrochlorothiazide 25 milliGRAM(s) Oral daily  simethicone 80 milliGRAM(s) Chew once  sodium chloride 0.9%. 1000 milliLiter(s) (100 mL/Hr) IV Continuous <Continuous>  valsartan 320 milliGRAM(s) Oral daily    MEDICATIONS  (PRN):    Vital Signs Last 24 Hrs  T(C): 36.5 (01-15-24 @ 23:01), Max: 36.6 (01-15-24 @ 17:30)  T(F): 97.7 (01-15-24 @ 23:01), Max: 97.8 (01-15-24 @ 17:30)  HR: 56 (01-15-24 @ 23:01) (50 - 58)  BP: 131/67 (01-15-24 @ 23:01) (131/67 - 166/73)  BP(mean): 97 (01-15-24 @ 01:56) (97 - 97)  RR: 18 (01-15-24 @ 23:01) (18 - 19)  SpO2: 99% (01-15-24 @ 23:01) (97% - 100%)    Orthostatic VS  01-15-24 @ 16:28  Lying BP: 149/79 HR: 55  Sitting BP: 166/82 HR: 53  Standing BP: 160/79 HR: 54  Site: --  Mode: --    REVIEW OF SYSTEMS:  CONSTITUTIONAL: No fever, weight loss, or fatigue  EYES: No eye pain, visual disturbances, or discharge  ENMT:  No difficulty hearing, tinnitus, vertigo; No sinus or throat pain  NECK: No pain or stiffness  RESPIRATORY: No cough, wheezing, chills or hemoptysis; No shortness of breath  CARDIOVASCULAR: No chest pain, palpitations, dizziness, or leg swelling  GASTROINTESTINAL: No abdominal or epigastric pain. No nausea, vomiting, or hematemesis; No diarrhea or constipation. No melena or hematochezia.  GENITOURINARY: No dysuria, frequency, hematuria, or incontinence  NEUROLOGICAL: No headaches, memory loss, loss of strength, numbness, or tremors      Consultant(s) Notes Reviewed:  [x ] YES  [ ] NO    PHYSICAL EXAM:  GENERAL: NAD, well-groomed, well-developed,not in any distress ,  HEAD:  Atraumatic, Normocephalic  EYES: EOMI, PERRLA, conjunctiva and sclera clear  ENMT: No tonsillar erythema, exudates, or enlargement; Moist mucous membranes, Good dentition, No lesions  NECK: Supple, No JVD, Normal thyroid  NERVOUS SYSTEM:  Alert awake  CHEST/LUNG: dec breath sounds at bases  HEART:s1, s2+  ABDOMEN: Soft, Nontender, Nondistended; Bowel sounds present  EXTREMITIES:  2+ Peripheral Pulses, No clubbing, cyanosis, or edema    Care Discussed with Consultants/Other Providers [ x] YES  [ ] NO    LABS:  01-15    140  |  104  |  16  ----------------------------<  87  4.3   |  27  |  0.69    Ca    9.1      15 Han 2024 05:01  Phos  3.7     01-15  Mg     1.70     01-15      Creatinine Trend: 0.69 <--, 0.61 <--, 0.61 <--, 0.75 <--                        11.6   7.07  )-----------( 246      ( 15 Han 2024 05:01 )             34.9     Urine Studies:  Urinalysis Basic - ( 15 Han 2024 05:01 )    Color:  / Appearance:  / SG:  / pH:   Gluc: 87 mg/dL / Ketone:   / Bili:  / Urobili:    Blood:  / Protein:  / Nitrite:    Leuk Esterase:  / RBC:  / WBC    Sq Epi:  / Non Sq Epi:  / Bacteria:

## 2024-01-15 NOTE — PATIENT PROFILE ADULT - FALL HARM RISK - HARM RISK INTERVENTIONS

## 2024-01-16 ENCOUNTER — TRANSCRIPTION ENCOUNTER (OUTPATIENT)
Age: 74
End: 2024-01-16

## 2024-01-16 VITALS
SYSTOLIC BLOOD PRESSURE: 135 MMHG | HEART RATE: 55 BPM | RESPIRATION RATE: 18 BRPM | DIASTOLIC BLOOD PRESSURE: 81 MMHG | TEMPERATURE: 98 F | OXYGEN SATURATION: 98 %

## 2024-01-16 LAB
-  AMOXICILLIN/CLAVULANIC ACID: SIGNIFICANT CHANGE UP
-  AMPICILLIN/SULBACTAM: SIGNIFICANT CHANGE UP
-  AMPICILLIN: SIGNIFICANT CHANGE UP
-  AZTREONAM: SIGNIFICANT CHANGE UP
-  CEFAZOLIN: SIGNIFICANT CHANGE UP
-  CEFEPIME: SIGNIFICANT CHANGE UP
-  CEFOXITIN: SIGNIFICANT CHANGE UP
-  CEFTRIAXONE: SIGNIFICANT CHANGE UP
-  CEFUROXIME: SIGNIFICANT CHANGE UP
-  CIPROFLOXACIN: SIGNIFICANT CHANGE UP
-  ERTAPENEM: SIGNIFICANT CHANGE UP
-  GENTAMICIN: SIGNIFICANT CHANGE UP
-  IMIPENEM: SIGNIFICANT CHANGE UP
-  LEVOFLOXACIN: SIGNIFICANT CHANGE UP
-  MEROPENEM: SIGNIFICANT CHANGE UP
-  NITROFURANTOIN: SIGNIFICANT CHANGE UP
-  PIPERACILLIN/TAZOBACTAM: SIGNIFICANT CHANGE UP
-  TOBRAMYCIN: SIGNIFICANT CHANGE UP
-  TRIMETHOPRIM/SULFAMETHOXAZOLE: SIGNIFICANT CHANGE UP
ANION GAP SERPL CALC-SCNC: 14 MMOL/L — SIGNIFICANT CHANGE UP (ref 7–14)
BUN SERPL-MCNC: 16 MG/DL — SIGNIFICANT CHANGE UP (ref 7–23)
CALCIUM SERPL-MCNC: 9.5 MG/DL — SIGNIFICANT CHANGE UP (ref 8.4–10.5)
CHLORIDE SERPL-SCNC: 99 MMOL/L — SIGNIFICANT CHANGE UP (ref 98–107)
CO2 SERPL-SCNC: 25 MMOL/L — SIGNIFICANT CHANGE UP (ref 22–31)
CREAT SERPL-MCNC: 0.7 MG/DL — SIGNIFICANT CHANGE UP (ref 0.5–1.3)
CULTURE RESULTS: ABNORMAL
EGFR: 92 ML/MIN/1.73M2 — SIGNIFICANT CHANGE UP
GLUCOSE SERPL-MCNC: 93 MG/DL — SIGNIFICANT CHANGE UP (ref 70–99)
HCT VFR BLD CALC: 36.3 % — SIGNIFICANT CHANGE UP (ref 34.5–45)
HGB BLD-MCNC: 12.4 G/DL — SIGNIFICANT CHANGE UP (ref 11.5–15.5)
MAGNESIUM SERPL-MCNC: 1.7 MG/DL — SIGNIFICANT CHANGE UP (ref 1.6–2.6)
MCHC RBC-ENTMCNC: 33.5 PG — SIGNIFICANT CHANGE UP (ref 27–34)
MCHC RBC-ENTMCNC: 34.2 GM/DL — SIGNIFICANT CHANGE UP (ref 32–36)
MCV RBC AUTO: 98.1 FL — SIGNIFICANT CHANGE UP (ref 80–100)
METHOD TYPE: SIGNIFICANT CHANGE UP
NRBC # BLD: 0 /100 WBCS — SIGNIFICANT CHANGE UP (ref 0–0)
NRBC # FLD: 0 K/UL — SIGNIFICANT CHANGE UP (ref 0–0)
ORGANISM # SPEC MICROSCOPIC CNT: ABNORMAL
ORGANISM # SPEC MICROSCOPIC CNT: ABNORMAL
PHOSPHATE SERPL-MCNC: 3.7 MG/DL — SIGNIFICANT CHANGE UP (ref 2.5–4.5)
PLATELET # BLD AUTO: 269 K/UL — SIGNIFICANT CHANGE UP (ref 150–400)
POTASSIUM SERPL-MCNC: 3.8 MMOL/L — SIGNIFICANT CHANGE UP (ref 3.5–5.3)
POTASSIUM SERPL-SCNC: 3.8 MMOL/L — SIGNIFICANT CHANGE UP (ref 3.5–5.3)
RBC # BLD: 3.7 M/UL — LOW (ref 3.8–5.2)
RBC # FLD: 13.5 % — SIGNIFICANT CHANGE UP (ref 10.3–14.5)
SODIUM SERPL-SCNC: 138 MMOL/L — SIGNIFICANT CHANGE UP (ref 135–145)
SPECIMEN SOURCE: SIGNIFICANT CHANGE UP
WBC # BLD: 7.69 K/UL — SIGNIFICANT CHANGE UP (ref 3.8–10.5)
WBC # FLD AUTO: 7.69 K/UL — SIGNIFICANT CHANGE UP (ref 3.8–10.5)

## 2024-01-16 RX ORDER — RIVAROXABAN 15 MG-20MG
1 KIT ORAL
Qty: 0 | Refills: 0 | DISCHARGE

## 2024-01-16 RX ORDER — ATORVASTATIN CALCIUM 80 MG/1
1 TABLET, FILM COATED ORAL
Refills: 0 | DISCHARGE

## 2024-01-16 RX ORDER — AMLODIPINE BESYLATE 2.5 MG/1
1 TABLET ORAL
Qty: 0 | Refills: 0 | DISCHARGE
Start: 2024-01-16

## 2024-01-16 RX ORDER — AMLODIPINE BESYLATE 2.5 MG/1
1 TABLET ORAL
Qty: 0 | Refills: 0 | DISCHARGE

## 2024-01-16 RX ORDER — AMLODIPINE BESYLATE 2.5 MG/1
1 TABLET ORAL
Refills: 0 | DISCHARGE

## 2024-01-16 RX ORDER — ASPIRIN/CALCIUM CARB/MAGNESIUM 324 MG
1 TABLET ORAL
Refills: 0 | DISCHARGE

## 2024-01-16 RX ADMIN — Medication 81 MILLIGRAM(S): at 12:22

## 2024-01-16 RX ADMIN — AMLODIPINE BESYLATE 5 MILLIGRAM(S): 2.5 TABLET ORAL at 06:45

## 2024-01-16 RX ADMIN — SIMETHICONE 80 MILLIGRAM(S): 80 TABLET, CHEWABLE ORAL at 00:02

## 2024-01-16 RX ADMIN — CITALOPRAM 20 MILLIGRAM(S): 10 TABLET, FILM COATED ORAL at 12:22

## 2024-01-16 RX ADMIN — VALSARTAN 320 MILLIGRAM(S): 80 TABLET ORAL at 06:44

## 2024-01-16 RX ADMIN — ANASTROZOLE 1 MILLIGRAM(S): 1 TABLET ORAL at 12:23

## 2024-01-16 NOTE — DISCHARGE NOTE PROVIDER - CARE PROVIDER_API CALL
Becca Mendoza  Cardiovascular Disease  [address] [place] [address], [address]  [place] [address]  Phone: [phone]  Fax: [phone]  Follow Up Time:     Lester Benedict  Gastroenterology  [address] [place] [address], [address]  [place] [address]  Phone: [phone]  Fax: [phone]  Follow Up Time:     Elaine Meyers  Internal Medicine  [address] [place] [address], [address]  [place] [address]  Phone: [phone]  Fax: [phone]  Follow Up Time:    Becca Mendoza  Cardiovascular Disease  [address], [address]  [place] [address]  Phone: [phone]  Fax: [phone]  Follow Up Time:     Lester Benedict  Gastroenterology  [address], [address]  [place] [address]  Phone: [phone]  Fax: [phone]  Follow Up Time:     Elaine Meyers  Internal Medicine  [address], [address]  [place] [address]  Phone: [phone]  Fax: [phone]  Follow Up Time:

## 2024-01-16 NOTE — DISCHARGE NOTE PROVIDER - NSDCMRMEDTOKEN_GEN_ALL_CORE_FT
amLODIPine 10 mg oral tablet: 1 tab(s) orally once a day  anastrozole 1 mg oral tablet: 1 tab(s) orally once a day  aspirin 81 mg oral delayed release tablet: 1 tab(s) orally once a day  atorvastatin 40 mg oral tablet: 1 tab(s) orally once a day  citalopram 40 mg oral tablet: 1 tab(s) orally once a day  citalopram 40 mg oral tablet: 1 tab(s) orally once a day  Diovan  mg-25 mg oral tablet: 1 tab(s) orally once a day  ibuprofen: 2 tab(s) orally , As Needed  Norvasc 10 mg oral tablet: 1 tab(s) orally once a day  PriLOSEC: 1 tab(s) orally once a day  valsartan-hydrochlorothiazide 320 mg-25 mg oral tablet: 1 tab(s) orally once a day  Xarelto 2.5 mg oral tablet: 1 tab(s) orally 2 times a day   amLODIPine 10 mg oral tablet: 1 tab(s) orally once a day  anastrozole 1 mg oral tablet: 1 tab(s) orally once a day  aspirin 81 mg oral delayed release tablet: 1 tab(s) orally once a day  atorvastatin 40 mg oral tablet: 1 tab(s) orally once a day  citalopram 40 mg oral tablet: 1 tab(s) orally once a day  citalopram 40 mg oral tablet: 1 tab(s) orally once a day  Diovan  mg-25 mg oral tablet: 1 tab(s) orally once a day  ibuprofen: 2 tab(s) orally , As Needed  Norvasc 10 mg oral tablet: 1 tab(s) orally once a day  PriLOSEC: 1 tab(s) orally once a day  Xarelto 2.5 mg oral tablet: 1 tab(s) orally 2 times a day   amLODIPine 5 mg oral tablet: 1 tab(s) orally once a day  anastrozole 1 mg oral tablet: 1 tab(s) orally once a day  aspirin 81 mg oral delayed release tablet: 1 tab(s) orally once a day  atorvastatin 40 mg oral tablet: 1 tab(s) orally once a day  citalopram 40 mg oral tablet: 1 tab(s) orally once a day  Diovan  mg-25 mg oral tablet: 1 tab(s) orally once a day  ibuprofen: 2 tab(s) orally , As Needed  PriLOSEC: 1 tab(s) orally once a day  Xarelto 2.5 mg oral tablet: 1 tab(s) orally 2 times a day

## 2024-01-16 NOTE — DISCHARGE NOTE PROVIDER - NSDCCPCAREPLAN_GEN_ALL_CORE_FT
PRINCIPAL DISCHARGE DIAGNOSIS  Diagnosis: Elevated troponin  Assessment and Plan of Treatment: You were found to have elevated cardiac enzymes you were seen by a cardiologist, please follow up with cardiology out patient for possible stress test. within 1-2 weeks after D/C      SECONDARY DISCHARGE DIAGNOSES  Diagnosis: Fatigue  Assessment and Plan of Treatment: You came with lightheadedness this has subsided with IV fluids, please follow up with your PCP    Diagnosis: Right hip pain  Assessment and Plan of Treatment:     Diagnosis: Abdominal pain  Assessment and Plan of Treatment: You had abdominal pain when you came into the hospital you were seen by a GI doctor please follow up with GI Dr Lester Benedict within 1-2 weeks after D/C     PRINCIPAL DISCHARGE DIAGNOSIS  Diagnosis: Elevated troponin  Assessment and Plan of Treatment: You were found to have elevated cardiac enzymes you were seen by a cardiologist, please follow up with cardiology out patient for possible stress test. within 1-2 weeks after D/C      SECONDARY DISCHARGE DIAGNOSES  Diagnosis: Fatigue  Assessment and Plan of Treatment: You came with lightheadedness this has subsided with IV fluids, please follow up with your PCP    Diagnosis: Right hip pain  Assessment and Plan of Treatment: You had right hip pain you will have, home physical therapy    Diagnosis: Abdominal pain  Assessment and Plan of Treatment: You had abdominal pain when you came into the hospital you were seen by a GI doctor please follow up with GI Dr Lester Benedict within 1-2 weeks after D/C     PRINCIPAL DISCHARGE DIAGNOSIS  Diagnosis: Elevated troponin  Assessment and Plan of Treatment: You were found to have elevated cardiac enzymes you were seen by a cardiologist, please follow up with cardiology out patient for possible stress test. within 1-2 weeks after D/C      SECONDARY DISCHARGE DIAGNOSES  Diagnosis: Fatigue  Assessment and Plan of Treatment: You came with lightheadedness this has subsided with IV fluids, please follow up with your PCP    Diagnosis: Right hip pain  Assessment and Plan of Treatment: You had right hip pain you will have outpatient physical therapy    Diagnosis: Abdominal pain  Assessment and Plan of Treatment: You had abdominal pain when you came into the hospital you were seen by a GI doctor please follow up with GI Dr Lester Benedict within 1-2 weeks after D/C

## 2024-01-16 NOTE — PROGRESS NOTE ADULT - REASON FOR ADMISSION
Dizziness with chest discomfort

## 2024-01-16 NOTE — DISCHARGE NOTE NURSING/CASE MANAGEMENT/SOCIAL WORK - NSDCPEFALRISK_GEN_ALL_CORE
For information on Fall & Injury Prevention, visit: [email or web address] OR  [email or web address] OR  https://www.cdc.gov/steadi/patient.html For information on Fall & Injury Prevention, visit: [email or web address] OR  [email or web address] OR  https://www.cdc.gov/steadi/patient.html

## 2024-01-16 NOTE — DISCHARGE NOTE NURSING/CASE MANAGEMENT/SOCIAL WORK - NSDCPEEMAIL_GEN_ALL_CORE
[facility] for Tobacco Control email [email or web address]

## 2024-01-16 NOTE — PROGRESS NOTE ADULT - NS ATTEND AMEND GEN_ALL_CORE FT
Patient seen and examined. Agree with plan as detailed in PA/NP Note.     GI eval noted, outpatient EGD, no chest pain, denies any lightheadedness    Lindsay Marquez MD  Pager: [phone] Patient seen and examined. Agree with plan as detailed in PA/NP Note.     GI eval noted, outpatient EGD, no chest pain, denies any lightheadedness    Lindsay Marquez MD  Pager: [phone]

## 2024-01-16 NOTE — DISCHARGE NOTE NURSING/CASE MANAGEMENT/SOCIAL WORK - PATIENT PORTAL LINK FT
You can access the FollowMyHealth Patient Portal offered by [facility] by registering at the following website: [email or web address]. By joining [employer]’s FollowMyHealth portal, you will also be able to view your health information using other applications (apps) compatible with our system. You can access the FollowMyHealth Patient Portal offered by [facility] by registering at the following website: [email or web address]. By joining [employer]’s FollowMyHealth portal, you will also be able to view your health information using other applications (apps) compatible with our system.

## 2024-01-16 NOTE — PROGRESS NOTE ADULT - PROVIDER SPECIALTY LIST ADULT
Cardiology
Gastroenterology
Cardiology
Internal Medicine
Internal Medicine
Gastroenterology
Internal Medicine

## 2024-01-16 NOTE — DISCHARGE NOTE PROVIDER - CARE PROVIDERS DIRECT ADDRESSES
,DirectAddress_Unknown,[email or web address],DirectAddress_Unknown ,DirectAddress_Unknown,[email or web address],DirectAddress_Unknown

## 2024-01-16 NOTE — PROGRESS NOTE ADULT - SUBJECTIVE AND OBJECTIVE BOX
Date of service 1/16/24    extended hpi: 73 year old Female with history of HTN, CAD hx remote RCA stent, s/p LCx stent in July of 2018, last C 1/2019 with patent stents, tobacco abuse, HLD, and GERD, presented from work today with multiple complaints. She reports lightheadedness, abdominal pain, R hip pain    Patient denies chest pain or shortness of breath. Reports abdominal discomfort and early satiety after eating.  Review of symptoms otherwise negative.    Review of Systems:   Constitutional: [ ] fevers, [ ] chills.   Skin: [ ] dry skin. [ ] rashes.  Psychiatric: [ ] depression, [ ] anxiety.   Gastrointestinal: [ ] BRBPR, [ ] melena.   Neurological: [ ] confusion. [ ] seizures. [ ] shuffling gait.   Ears,Nose,Mouth and Throat: [ ] ear pain [ ] sore throat.   Eyes: [ ] diplopia.   Respiratory: [ ] hemoptysis. [ ] shortness of breath  Cardiovascular: See HPI above  Hematologic/Lymphatic: [ ] anemia. [ ] painful nodes. [ ] prolonged bleeding.   Genitourinary: [ ] hematuria. [ ] flank pain.   Endocrine: [ ] significant change in weight. [ ] intolerance to heat and cold.     Review of systems [x] otherwise negative, [ ] otherwise unable to obtain    FH: no family history of sudden cardiac death in first degree relatives    SH: [ ] tobacco, [ ] alcohol, [ ] drugs    amLODIPine   Tablet 5 milliGRAM(s) Oral daily  anastrozole 1 milliGRAM(s) Oral daily  aspirin enteric coated 81 milliGRAM(s) Oral daily  atorvastatin 40 milliGRAM(s) Oral at bedtime  citalopram 20 milliGRAM(s) Oral daily  hydrochlorothiazide 25 milliGRAM(s) Oral daily  sodium chloride 0.9%. 1000 milliLiter(s) IV Continuous <Continuous>  valsartan 320 milliGRAM(s) Oral daily                            12.4   7.69  )-----------( 269      ( 16 Jan 2024 06:50 )             36.3       01-16    138  |  99  |  16  ----------------------------<  93  3.8   |  25  |  0.70    Ca    9.5      16 Jan 2024 06:50  Phos  3.7     01-16  Mg     1.70     01-16        T(C): 36.9 (01-16-24 @ 11:45), Max: 36.9 (01-16-24 @ 11:45)  HR: 57 (01-16-24 @ 11:45) (48 - 57)  BP: 140/94 (01-16-24 @ 11:45) (131/67 - 166/73)  RR: 17 (01-16-24 @ 11:45) (16 - 18)  SpO2: 97% (01-16-24 @ 11:45) (96% - 100%)  Wt(kg): --    General: Alert and Oriented * 3.   Head: Normocephalic and atraumatic.   Neck: No JVD. No bruits. Supple. Does not appear to be enlarged.   Cardiovascular: + S1,S2 ; RRR Soft systolic murmur at the left lower sternal border. No rubs noted.    Lungs: CTA b/l. No rhonchi, rales or wheezes.   Abdomen: + BS, soft. Non tender. Non distended. No rebound. No guarding.   Extremities: No clubbing/cyanosis/edema.   Neurologic: Moves all four extremities. Full range of motion.   Skin: Warm and moist. The patient's skin has normal elasticity and good skin turgor.   Psychiatric: Appropriate mood and affect.  Musculoskeletal: Normal range of motion, normal strength    DATA    TELEMETRY: SR PVCs	      ECG:  	SR PVCs    < from: US Abdomen Complete (US Abdomen Complete .) (01.12.24 @ 11:30) >  IMPRESSION:  Dilated CBD measuring up to 1.0 cm. Distended gallbladder without   evidence of acute cholecystitis. Recommend further evaluation with MRCP   to rule out choledocholithiasis.    < end of copied text >      < from: Xray Chest 2 Views PA/Lat (01.12.24 @ 12:55) >  IMPRESSION: Clear lungs.    < end of copied text >      ASSESSMENT/PLAN: 	73 year old Female with history of HTN, CAD hx remote RCA stent, s/p LCx stent in July of 2018, last [facility] 1/2019 with patent stents, tobacco abuse, HLD, and GERD, presented from work today with multiple complaints. She reports lightheadedness, abdominal pain, R hip pain    Cardiology consulted for hx CAD, well known to the office and elevated Trop T    #elevated Trop T  --mildly elevated with negative CK  --pt without unstable anginal sx or acute EKG changes, not c/w ACS  --recent office echo 1/10/24 with Normal LV sys fxn    #Abdominal Pain  --Abnormal US  --Work up per medicine/GI  --reports symptoms after food intake, MRCP noted  --GI F/U noted, PPI and possible OP EGD    #CAD, hx PCI  --cont ASA, Statin    #lightheadedness  --orthostatic vitals +, resolved after IVF  --suspect due to decreased PO intake  --recent carotid US in the office without significant stenosis    no [facility] inpatient cardiac work up planned  OP F/U with Dr Mendoza in 1 week, [phone]                     Date of service 1/16/24    extended hpi: 73 year old Female with history of HTN, CAD hx remote RCA stent, s/p LCx stent in July of 2018, last C 1/2019 with patent stents, tobacco abuse, HLD, and GERD, presented from work today with multiple complaints. She reports lightheadedness, abdominal pain, R hip pain    Patient denies chest pain or shortness of breath. Reports abdominal discomfort and early satiety after eating.  Review of symptoms otherwise negative.    Review of Systems:   Constitutional: [ ] fevers, [ ] chills.   Skin: [ ] dry skin. [ ] rashes.  Psychiatric: [ ] depression, [ ] anxiety.   Gastrointestinal: [ ] BRBPR, [ ] melena.   Neurological: [ ] confusion. [ ] seizures. [ ] shuffling gait.   Ears,Nose,Mouth and Throat: [ ] ear pain [ ] sore throat.   Eyes: [ ] diplopia.   Respiratory: [ ] hemoptysis. [ ] shortness of breath  Cardiovascular: See HPI above  Hematologic/Lymphatic: [ ] anemia. [ ] painful nodes. [ ] prolonged bleeding.   Genitourinary: [ ] hematuria. [ ] flank pain.   Endocrine: [ ] significant change in weight. [ ] intolerance to heat and cold.     Review of systems [x] otherwise negative, [ ] otherwise unable to obtain    FH: no family history of sudden cardiac death in first degree relatives    SH: [ ] tobacco, [ ] alcohol, [ ] drugs    amLODIPine   Tablet 5 milliGRAM(s) Oral daily  anastrozole 1 milliGRAM(s) Oral daily  aspirin enteric coated 81 milliGRAM(s) Oral daily  atorvastatin 40 milliGRAM(s) Oral at bedtime  citalopram 20 milliGRAM(s) Oral daily  hydrochlorothiazide 25 milliGRAM(s) Oral daily  sodium chloride 0.9%. 1000 milliLiter(s) IV Continuous <Continuous>  valsartan 320 milliGRAM(s) Oral daily                            12.4   7.69  )-----------( 269      ( 16 Jan 2024 06:50 )             36.3       01-16    138  |  99  |  16  ----------------------------<  93  3.8   |  25  |  0.70    Ca    9.5      16 Jan 2024 06:50  Phos  3.7     01-16  Mg     1.70     01-16        T(C): 36.9 (01-16-24 @ 11:45), Max: 36.9 (01-16-24 @ 11:45)  HR: 57 (01-16-24 @ 11:45) (48 - 57)  BP: 140/94 (01-16-24 @ 11:45) (131/67 - 166/73)  RR: 17 (01-16-24 @ 11:45) (16 - 18)  SpO2: 97% (01-16-24 @ 11:45) (96% - 100%)  Wt(kg): --    General: Alert and Oriented * 3.   Head: Normocephalic and atraumatic.   Neck: No JVD. No bruits. Supple. Does not appear to be enlarged.   Cardiovascular: + S1,S2 ; RRR Soft systolic murmur at the left lower sternal border. No rubs noted.    Lungs: CTA b/l. No rhonchi, rales or wheezes.   Abdomen: + BS, soft. Non tender. Non distended. No rebound. No guarding.   Extremities: No clubbing/cyanosis/edema.   Neurologic: Moves all four extremities. Full range of motion.   Skin: Warm and moist. The patient's skin has normal elasticity and good skin turgor.   Psychiatric: Appropriate mood and affect.  Musculoskeletal: Normal range of motion, normal strength    DATA    TELEMETRY: SR PVCs	      ECG:  	SR PVCs    < from: US Abdomen Complete (US Abdomen Complete .) (01.12.24 @ 11:30) >  IMPRESSION:  Dilated CBD measuring up to 1.0 cm. Distended gallbladder without   evidence of acute cholecystitis. Recommend further evaluation with MRCP   to rule out choledocholithiasis.    < end of copied text >      < from: Xray Chest 2 Views PA/Lat (01.12.24 @ 12:55) >  IMPRESSION: Clear lungs.    < end of copied text >      ASSESSMENT/PLAN: 	73 year old Female with history of HTN, CAD hx remote RCA stent, s/p LCx stent in July of 2018, last [facility] 1/2019 with patent stents, tobacco abuse, HLD, and GERD, presented from work today with multiple complaints. She reports lightheadedness, abdominal pain, R hip pain    Cardiology consulted for hx CAD, well known to the office and elevated Trop T    #elevated Trop T  --mildly elevated with negative CK  --pt without unstable anginal sx or acute EKG changes, not c/w ACS  --recent office echo 1/10/24 with Normal LV sys fxn    #Abdominal Pain  --Abnormal US  --Work up per medicine/GI  --reports symptoms after food intake, MRCP noted  --GI F/U noted, PPI and possible OP EGD    #CAD, hx PCI  --cont ASA, Statin    #lightheadedness  --orthostatic vitals +, resolved after IVF  --suspect due to decreased PO intake  --recent carotid US in the office without significant stenosis    no [facility] inpatient cardiac work up planned  OP F/U with Dr Mendoza in 1 week, [phone]                     Date of service 1/16/24    extended hpi: 73 year old Female with history of HTN, CAD hx remote RCA stent, s/p LCx stent in July of 2018, last C 1/2019 with patent stents, tobacco abuse, HLD, and GERD, presented from work today with multiple complaints. She reports lightheadedness, abdominal pain, R hip pain    Patient denies chest pain or shortness of breath. Reports abdominal discomfort and early satiety after eating.  Review of symptoms otherwise negative.    Review of Systems:   Constitutional: [ ] fevers, [ ] chills.   Skin: [ ] dry skin. [ ] rashes.  Psychiatric: [ ] depression, [ ] anxiety.   Gastrointestinal: [ ] BRBPR, [ ] melena.   Neurological: [ ] confusion. [ ] seizures. [ ] shuffling gait.   Ears,Nose,Mouth and Throat: [ ] ear pain [ ] sore throat.   Eyes: [ ] diplopia.   Respiratory: [ ] hemoptysis. [ ] shortness of breath  Cardiovascular: See HPI above  Hematologic/Lymphatic: [ ] anemia. [ ] painful nodes. [ ] prolonged bleeding.   Genitourinary: [ ] hematuria. [ ] flank pain.   Endocrine: [ ] significant change in weight. [ ] intolerance to heat and cold.     Review of systems [x] otherwise negative, [ ] otherwise unable to obtain    FH: no family history of sudden cardiac death in first degree relatives    SH: [ ] tobacco, [ ] alcohol, [ ] drugs    amLODIPine   Tablet 5 milliGRAM(s) Oral daily  anastrozole 1 milliGRAM(s) Oral daily  aspirin enteric coated 81 milliGRAM(s) Oral daily  atorvastatin 40 milliGRAM(s) Oral at bedtime  citalopram 20 milliGRAM(s) Oral daily  hydrochlorothiazide 25 milliGRAM(s) Oral daily  sodium chloride 0.9%. 1000 milliLiter(s) IV Continuous <Continuous>  valsartan 320 milliGRAM(s) Oral daily                            12.4   7.69  )-----------( 269      ( 16 Jan 2024 06:50 )             36.3       01-16    138  |  99  |  16  ----------------------------<  93  3.8   |  25  |  0.70    Ca    9.5      16 Jan 2024 06:50  Phos  3.7     01-16  Mg     1.70     01-16        T(C): 36.9 (01-16-24 @ 11:45), Max: 36.9 (01-16-24 @ 11:45)  HR: 57 (01-16-24 @ 11:45) (48 - 57)  BP: 140/94 (01-16-24 @ 11:45) (131/67 - 166/73)  RR: 17 (01-16-24 @ 11:45) (16 - 18)  SpO2: 97% (01-16-24 @ 11:45) (96% - 100%)  Wt(kg): --    General: Alert and Oriented * 3.   Head: Normocephalic and atraumatic.   Neck: No JVD. No bruits. Supple. Does not appear to be enlarged.   Cardiovascular: + S1,S2 ; RRR Soft systolic murmur at the left lower sternal border. No rubs noted.    Lungs: CTA b/l. No rhonchi, rales or wheezes.   Abdomen: + BS, soft. Non tender. Non distended. No rebound. No guarding.   Extremities: No clubbing/cyanosis/edema.   Neurologic: Moves all four extremities. Full range of motion.   Skin: Warm and moist. The patient's skin has normal elasticity and good skin turgor.   Psychiatric: Appropriate mood and affect.  Musculoskeletal: Normal range of motion, normal strength    DATA    TELEMETRY: SR PVCs	      ECG:  	SR PVCs    < from: US Abdomen Complete (US Abdomen Complete .) (01.12.24 @ 11:30) >  IMPRESSION:  Dilated CBD measuring up to 1.0 cm. Distended gallbladder without   evidence of acute cholecystitis. Recommend further evaluation with MRCP   to rule out choledocholithiasis.    < end of copied text >      < from: Xray Chest 2 Views PA/Lat (01.12.24 @ 12:55) >  IMPRESSION: Clear lungs.    < end of copied text >      ASSESSMENT/PLAN: 	73 year old Female with history of HTN, CAD hx remote RCA stent, s/p LCx stent in July of 2018, last [facility] 1/2019 with patent stents, tobacco abuse, HLD, and GERD, presented from work today with multiple complaints. She reports lightheadedness, abdominal pain, R hip pain    Cardiology consulted for hx CAD, well known to the office and elevated Trop T    #elevated Trop T  --mildly elevated with negative CK  --pt without unstable anginal sx or acute EKG changes, not c/w ACS  --recent office echo 1/10/24 with Normal LV sys fxn    #Abdominal Pain  --Abnormal US  --Work up per medicine/GI  --reports symptoms after food intake, MRCP noted  --GI F/U noted, PPI and possible OP EGD    #CAD, hx PCI  --cont ASA, Statin    #lightheadedness  --orthostatic vitals +, resolved after IVF  --suspect due to decreased PO intake  --recent carotid US in the office without significant stenosis    no further inpatient cardiac work up planned  OP F/U with Dr Mendoza in 1 week, [phone]                     Date of service 1/16/24    extended hpi: 73 year old Female with history of HTN, CAD hx remote RCA stent, s/p LCx stent in July of 2018, last C 1/2019 with patent stents, tobacco abuse, HLD, and GERD, presented from work today with multiple complaints. She reports lightheadedness, abdominal pain, R hip pain    Patient denies chest pain or shortness of breath. Reports abdominal discomfort and early satiety after eating.  Review of symptoms otherwise negative.    Review of Systems:   Constitutional: [ ] fevers, [ ] chills.   Skin: [ ] dry skin. [ ] rashes.  Psychiatric: [ ] depression, [ ] anxiety.   Gastrointestinal: [ ] BRBPR, [ ] melena.   Neurological: [ ] confusion. [ ] seizures. [ ] shuffling gait.   Ears,Nose,Mouth and Throat: [ ] ear pain [ ] sore throat.   Eyes: [ ] diplopia.   Respiratory: [ ] hemoptysis. [ ] shortness of breath  Cardiovascular: See HPI above  Hematologic/Lymphatic: [ ] anemia. [ ] painful nodes. [ ] prolonged bleeding.   Genitourinary: [ ] hematuria. [ ] flank pain.   Endocrine: [ ] significant change in weight. [ ] intolerance to heat and cold.     Review of systems [x] otherwise negative, [ ] otherwise unable to obtain    FH: no family history of sudden cardiac death in first degree relatives    SH: [ ] tobacco, [ ] alcohol, [ ] drugs    amLODIPine   Tablet 5 milliGRAM(s) Oral daily  anastrozole 1 milliGRAM(s) Oral daily  aspirin enteric coated 81 milliGRAM(s) Oral daily  atorvastatin 40 milliGRAM(s) Oral at bedtime  citalopram 20 milliGRAM(s) Oral daily  hydrochlorothiazide 25 milliGRAM(s) Oral daily  sodium chloride 0.9%. 1000 milliLiter(s) IV Continuous <Continuous>  valsartan 320 milliGRAM(s) Oral daily                            12.4   7.69  )-----------( 269      ( 16 Jan 2024 06:50 )             36.3       01-16    138  |  99  |  16  ----------------------------<  93  3.8   |  25  |  0.70    Ca    9.5      16 Jan 2024 06:50  Phos  3.7     01-16  Mg     1.70     01-16        T(C): 36.9 (01-16-24 @ 11:45), Max: 36.9 (01-16-24 @ 11:45)  HR: 57 (01-16-24 @ 11:45) (48 - 57)  BP: 140/94 (01-16-24 @ 11:45) (131/67 - 166/73)  RR: 17 (01-16-24 @ 11:45) (16 - 18)  SpO2: 97% (01-16-24 @ 11:45) (96% - 100%)  Wt(kg): --    General: Alert and Oriented * 3.   Head: Normocephalic and atraumatic.   Neck: No JVD. No bruits. Supple. Does not appear to be enlarged.   Cardiovascular: + S1,S2 ; RRR Soft systolic murmur at the left lower sternal border. No rubs noted.    Lungs: CTA b/l. No rhonchi, rales or wheezes.   Abdomen: + BS, soft. Non tender. Non distended. No rebound. No guarding.   Extremities: No clubbing/cyanosis/edema.   Neurologic: Moves all four extremities. Full range of motion.   Skin: Warm and moist. The patient's skin has normal elasticity and good skin turgor.   Psychiatric: Appropriate mood and affect.  Musculoskeletal: Normal range of motion, normal strength    DATA    TELEMETRY: SR PVCs	      ECG:  	SR PVCs    < from: US Abdomen Complete (US Abdomen Complete .) (01.12.24 @ 11:30) >  IMPRESSION:  Dilated CBD measuring up to 1.0 cm. Distended gallbladder without   evidence of acute cholecystitis. Recommend further evaluation with MRCP   to rule out choledocholithiasis.    < end of copied text >      < from: Xray Chest 2 Views PA/Lat (01.12.24 @ 12:55) >  IMPRESSION: Clear lungs.    < end of copied text >      ASSESSMENT/PLAN: 	73 year old Female with history of HTN, CAD hx remote RCA stent, s/p LCx stent in July of 2018, last [facility] 1/2019 with patent stents, tobacco abuse, HLD, and GERD, presented from work today with multiple complaints. She reports lightheadedness, abdominal pain, R hip pain    Cardiology consulted for hx CAD, well known to the office and elevated Trop T    #elevated Trop T  --mildly elevated with negative CK  --pt without unstable anginal sx or acute EKG changes, not c/w ACS  --recent office echo 1/10/24 with Normal LV sys fxn    #Abdominal Pain  --Abnormal US  --Work up per medicine/GI  --reports symptoms after food intake, MRCP noted  --GI F/U noted, PPI and possible OP EGD    #CAD, hx PCI  --cont ASA, Statin    #lightheadedness  --orthostatic vitals +, resolved after IVF  --suspect due to decreased PO intake  --recent carotid US in the office without significant stenosis    no further inpatient cardiac work up planned  OP F/U with Dr Mendoza in 1 week, [phone]

## 2024-01-16 NOTE — DISCHARGE NOTE PROVIDER - HOSPITAL COURSE
74 y/o female with PMH HTN, CAD (6 stents; Cardiologist - Dr. Mendoza), HLD, arthritis presented to the ER c/o dizziness, epigastric abdominal pain and right hip pain. She has been feeling lightheaded for the past week and felt lightheaded at work today which prompted her to come to the ER. Pt denies chest pain, sob, LOC, vomiting, diarrhea, fevers, chills.  Pt reports 6 months of right hip pain worse the past 2 weeks.    Lightheaded.   - Will check Orthostasis Negative no symptoms upon standing  - Cards helping.    Chest pressure.   - Elevated trop but trending down   - Had TTE recently .  - further ischemic work up per cardiology.  - No further [facility] inpatient will follow up outpatient for possible NST    Dilated cbd, acquired.   - GI, no inpatient interventions   - LFT WNL .   - May need advanced GI consult if  MRCP abnormal  .    CAD in native artery.   - On ASA, Statin and R/O ACS.    HLD (hyperlipidemia).   - Statin.    HTN (hypertension).   - BP meds with hold parameters.    Patient seen and evaluated. Reviewed discharge medications with patient and attending. All new medications requiring new prescriptions were sent to the pharmacy of patient's choice. Reviewed need for prescription for previous home medications and new prescriptions sent if requested. Medically cleared/stable for discharge as per Dr. Mauricio on 1/16/24   with appropriate follow up. Patient understands and agrees with plan of care.

## 2024-01-16 NOTE — DISCHARGE NOTE NURSING/CASE MANAGEMENT/SOCIAL WORK - NSDCPEWEB_GEN_ALL_CORE
[facility] for Tobacco Control website --- [email or web address] website --- [email or web address] [facility] for Tobacco Control website --- [email or web address] website --- [email or web address]

## 2024-01-16 NOTE — PROGRESS NOTE ADULT - SUBJECTIVE AND OBJECTIVE BOX
Patient is a 73y Female     Patient is a 73y old  Female who presents with a chief complaint of Dizziness with chest discomfort (15 Han 2024 11:52)      HPI:   72 y/o female with PMH HTN, CAD (6 stents; Cardiologist - Dr. Mendoza), HLD, arthritis presented to the ER c/o dizziness, epigastric abdominal pain and right hip pain. She has been feeling lightheaded for the past week and felt lightheaded at work today which prompted her to come to the ER. Pt denies chest pain, sob, LOC, vomiting, diarrhea, fevers, chills.  Pt reports 6 months of right hip pain worse the past 2 weeks. (2024 14:47)      PAST MEDICAL & SURGICAL HISTORY:  CAD (coronary artery disease)  6 stents, most recent 18      GERD (gastroesophageal reflux disease)      Smoker  Current smoker 40 pack years      Bradycardia      Hypertension      Hyperlipidemia      Depression      Anxiety      Other chronic osteomyelitis of foot  [id number]      Macular hole  s/p vitrectomy      Malignant neoplasm of left breast      CAD (coronary artery disease)      Stented coronary artery      Other specified arthritis of left ankle or foot      Contracture of left ankle      Other synovitis and tenosynovitis, left ankle and foot      HTN (hypertension)      HLD (hyperlipidemia)      GERD (gastroesophageal reflux disease)      Anxiety and depression      History of left breast cancer       delivery delivered  x2      Stented coronary artery  x 6 most recent 18      Carpal tunnel syndrome, bilateral  b/l wrist surgery for carpal tunnel syndrome      S/P foot surgery, right  Bunionectomy and hammer toe with multiple revisions x 5      Amputation of toe  right middle toe      S/P foot surgery, right      H/O amputation of lesser toe      S/P cardiac cath      S/P lumpectomy, left breast      S/P radiation therapy      H/O colonoscopy      S/P endoscopy          MEDICATIONS  (STANDING):  amLODIPine   Tablet 5 milliGRAM(s) Oral daily  anastrozole 1 milliGRAM(s) Oral daily  aspirin enteric coated 81 milliGRAM(s) Oral daily  atorvastatin 40 milliGRAM(s) Oral at bedtime  citalopram 20 milliGRAM(s) Oral daily  hydrochlorothiazide 25 milliGRAM(s) Oral daily  sodium chloride 0.9%. 1000 milliLiter(s) (100 mL/Hr) IV Continuous <Continuous>  valsartan 320 milliGRAM(s) Oral daily      Allergies    No Known Allergies    Intolerances        SOCIAL HISTORY:  Denies ETOh,Smoking,     FAMILY HISTORY:  Family history of myocardial infarction (Grandparent)  Paternal Grandmother diet in her 60's of MI    Family history of breast cancer (Grandparent)  Maternal Grandmother    Family history of heart disease (Sibling)  Twin sister has cardiac stents  Brother has cardiac stents    Family history of Alzheimer's disease  Father    Family history of prostate cancer (Sibling)  brother    FH: breast cancer (Sibling)  twin sister    FH: prostate cancer (Sibling)  Brother    FH: breast cancer (Sibling, Grandparent)        REVIEW OF SYSTEMS:    CONSTITUTIONAL: No weakness, fevers or chills  EYES/ENT: No visual changes;  No vertigo or throat pain   NECK: No pain or stiffness  RESPIRATORY: No cough, wheezing, hemoptysis; No shortness of breath  CARDIOVASCULAR: No chest pain or palpitations  GASTROINTESTINAL: No abdominal or epigastric pain. No nausea, vomiting, or hematemesis; No diarrhea or constipation. No melena or hematochezia.  GENITOURINARY: No dysuria, frequency or hematuria  NEUROLOGICAL: No numbness or weakness  SKIN: No itching, burning, rashes, or lesions   All other review of systems is negative unless indicated above.    VITAL:  T(C): , Max: 36.6 (01-15-24 @ 17:30)  T(F): , Max: 97.8 (01-15-24 @ 17:30)  HR: 48 (24 @ 03:30)  BP: 143/66 (24 @ 03:30)  BP(mean): --  RR: 16 (24 @ 03:30)  SpO2: 96% (24 @ 03:30)  Wt(kg): --    I and O's:    Height (cm): 170.2 (01-15 @ 18:25)  Weight (kg): 76 (01-15 @ 18:25)  BMI (kg/m2): 26.2 (01-15 @ 18:25)  BSA (m2): 1.88 (01-15 @ 18:25)    PHYSICAL EXAM:    Constitutional: NAD  HEENT: PERRLA,   Neck: No JVD  Respiratory: CTA B/L  Cardiovascular: S1 and S2  Gastrointestinal: BS+, soft, NT/ND  Extremities: No peripheral edema  Neurological: A/O x 3, no focal deficits  Psychiatric: Normal mood, normal affect  : No Hernández  Skin: No rashes  Access: Not applicable  Back: No CVA tenderness    LABS:                        11.6   7.07  )-----------( 246      ( 15 Han 2024 05:01 )             34.9     01-15    140  |  104  |  16  ----------------------------<  87  4.3   |  27  |  0.69    Ca    9.1      15 Han 2024 05:01  Phos  3.7     01-15  Mg     1.70     -15            RADIOLOGY & ADDITIONAL STUDIES:                           Patient is a 73y Female     Patient is a 73y old  Female who presents with a chief complaint of Dizziness with chest discomfort (15 Han 2024 11:52)      HPI:   72 y/o female with PMH HTN, CAD (6 stents; Cardiologist - Dr. Mendoza), HLD, arthritis presented to the ER c/o dizziness, epigastric abdominal pain and right hip pain. She has been feeling lightheaded for the past week and felt lightheaded at work today which prompted her to come to the ER. Pt denies chest pain, sob, LOC, vomiting, diarrhea, fevers, chills.  Pt reports 6 months of right hip pain worse the past 2 weeks. (2024 14:47)      PAST MEDICAL & SURGICAL HISTORY:  CAD (coronary artery disease)  6 stents, most recent 18      GERD (gastroesophageal reflux disease)      Smoker  Current smoker 40 pack years      Bradycardia      Hypertension      Hyperlipidemia      Depression      Anxiety      Other chronic osteomyelitis of foot  [id number]      Macular hole  s/p vitrectomy      Malignant neoplasm of left breast      CAD (coronary artery disease)      Stented coronary artery      Other specified arthritis of left ankle or foot      Contracture of left ankle      Other synovitis and tenosynovitis, left ankle and foot      HTN (hypertension)      HLD (hyperlipidemia)      GERD (gastroesophageal reflux disease)      Anxiety and depression      History of left breast cancer       delivery delivered  x2      Stented coronary artery  x 6 most recent 18      Carpal tunnel syndrome, bilateral  b/l wrist surgery for carpal tunnel syndrome      S/P foot surgery, right  Bunionectomy and hammer toe with multiple revisions x 5      Amputation of toe  right middle toe      S/P foot surgery, right      H/O amputation of lesser toe      S/P cardiac cath      S/P lumpectomy, left breast      S/P radiation therapy      H/O colonoscopy      S/P endoscopy          MEDICATIONS  (STANDING):  amLODIPine   Tablet 5 milliGRAM(s) Oral daily  anastrozole 1 milliGRAM(s) Oral daily  aspirin enteric coated 81 milliGRAM(s) Oral daily  atorvastatin 40 milliGRAM(s) Oral at bedtime  citalopram 20 milliGRAM(s) Oral daily  hydrochlorothiazide 25 milliGRAM(s) Oral daily  sodium chloride 0.9%. 1000 milliLiter(s) (100 mL/Hr) IV Continuous <Continuous>  valsartan 320 milliGRAM(s) Oral daily      Allergies    No Known Allergies    Intolerances        SOCIAL HISTORY:  Denies ETOh,Smoking,     FAMILY HISTORY:  Family history of myocardial infarction (Grandparent)  Paternal Grandmother diet in her 60's of MI    Family history of breast cancer (Grandparent)  Maternal Grandmother    Family history of heart disease (Sibling)  Twin sister has cardiac stents  Brother has cardiac stents    Family history of Alzheimer's disease  Father    Family history of prostate cancer (Sibling)  brother    FH: breast cancer (Sibling)  twin sister    FH: prostate cancer (Sibling)  Brother    FH: breast cancer (Sibling, Grandparent)        REVIEW OF SYSTEMS:    CONSTITUTIONAL: No weakness, fevers or chills  EYES/ENT: No visual changes;  No vertigo or throat pain   NECK: No pain or stiffness  RESPIRATORY: No cough, wheezing, hemoptysis; No shortness of breath  CARDIOVASCULAR: No chest pain or palpitations  GASTROINTESTINAL: No abdominal or epigastric pain. No nausea, vomiting, or hematemesis; No diarrhea or constipation. No melena or hematochezia.  GENITOURINARY: No dysuria, frequency or hematuria  NEUROLOGICAL: No numbness or weakness  SKIN: No itching, burning, rashes, or lesions   All other review of systems is negative unless indicated above.    VITAL:  T(C): , Max: 36.6 (01-15-24 @ 17:30)  T(F): , Max: 97.8 (01-15-24 @ 17:30)  HR: 48 (24 @ 03:30)  BP: 143/66 (24 @ 03:30)  BP(mean): --  RR: 16 (24 @ 03:30)  SpO2: 96% (24 @ 03:30)  Wt(kg): --    I and O's:    Height (cm): 170.2 (01-15 @ 18:25)  Weight (kg): 76 (01-15 @ 18:25)  BMI (kg/m2): 26.2 (01-15 @ 18:25)  BSA (m2): 1.88 (01-15 @ 18:25)    PHYSICAL EXAM:    Constitutional: NAD  HEENT: PERRLA,   Neck: No JVD  Respiratory: CTA B/L  Cardiovascular: S1 and S2  Gastrointestinal: BS+, soft, NT/ND  Extremities: No peripheral edema  Neurological: A/O x 3, no focal deficits  Psychiatric: Normal mood, normal affect  : No Hernández  Skin: No rashes  Access: Not applicable  Back: No CVA tenderness    LABS:                        11.6   7.07  )-----------( 246      ( 15 Han 2024 05:01 )             34.9     01-15    140  |  104  |  16  ----------------------------<  87  4.3   |  27  |  0.69    Ca    9.1      15 Han 2024 05:01  Phos  3.7     01-15  Mg     1.70     -15            RADIOLOGY & ADDITIONAL STUDIES:

## 2024-01-16 NOTE — DISCHARGE NOTE NURSING/CASE MANAGEMENT/SOCIAL WORK - NSDCPEPAMP_GEN_ALL_CORE
“[facility] for Tobacco Control” pamphlet given “[facility] for Tobacco Control” pamphlet given

## 2024-01-16 NOTE — DISCHARGE NOTE PROVIDER - PROVIDER TOKENS
PROVIDER:[TOKEN:[[id number]]],PROVIDER:[TOKEN:[[id number]]],PROVIDER:[TOKEN:[[id number]]] PROVIDER:[TOKEN:[[id number]]],PROVIDER:[TOKEN:[[id number]]],PROVIDER:[TOKEN:[[id number]]]

## 2024-01-16 NOTE — PROGRESS NOTE ADULT - ASSESSMENT
pt well known to me  ppi thearpy for dyspeisa  conservative magnemnt  possible op egd  recommend pantoprazole bid and carafate for breaksthrough

## 2024-01-16 NOTE — DISCHARGE NOTE NURSING/CASE MANAGEMENT/SOCIAL WORK - NSDCFUADDAPPT_GEN_ALL_CORE_FT
OP F/U with cardiologist Dr Mendoza in 1 week, [phone] OP F/U with cardiologist Dr Mendoza in 1 week, [phone]

## 2024-01-16 NOTE — DISCHARGE NOTE NURSING/CASE MANAGEMENT/SOCIAL WORK - NSDCPEHOTLINE_GEN_ALL_CORE
[facility] Smokers Quitline [phone] ([phone]) [facility] Smokers Quitline [phone] ([phone])

## 2024-01-16 NOTE — DISCHARGE NOTE PROVIDER - NSDCFUADDAPPT_GEN_ALL_CORE_FT
OP F/U with cardiologist Dr Mendoza in 1 week, [phone] OP F/U with cardiologist Dr Mendoza in 1 week, [phone]

## 2024-02-20 ENCOUNTER — NON-APPOINTMENT (OUTPATIENT)
Age: 74
End: 2024-02-20

## 2024-02-20 DIAGNOSIS — F17.210 NICOTINE DEPENDENCE, CIGARETTES, UNCOMPLICATED: ICD-10-CM

## 2024-02-22 VITALS — WEIGHT: 177 LBS | BODY MASS INDEX: 27.78 KG/M2 | HEIGHT: 67 IN

## 2024-02-22 NOTE — HISTORY OF PRESENT ILLNESS
[Current] : Current [TextBox_13] : Referred by Dr. Meyers  Ms. MARTITA BARRY is a 73 year old woman with a history of nicotine dependence   Over the telephone today we reviewed and confirmed that the patient meets screening eligibility criteria:  -Age: 73 years old  Smoking status:  -Current smoker -Number of pack(s) per day:1 -Number of years smoked:43 -Number of pack years smokin  Ms. BARRY denies any personal history of lung cancer. Denies any s/s of lung cancer. No lung cancer in a 1st degree relative. Denies any history of lung disease. Denies any history of occupational exposures. H/o of breast ca.  [PacksperDay] : 1 [N_Years] : 43 [PacksperYear] : 43

## 2024-02-22 NOTE — PLAN
[Smoking Cessation] : smoking cessation [FreeTextEntry1] : Plan: -Low Dose CT chest for lung cancer screening scheduled for 3/18/24 at [facility] [place] -Patient to follow up with Dr. Meyers -Encouraged smoking cessation

## 2024-03-18 ENCOUNTER — APPOINTMENT (OUTPATIENT)
Dept: ULTRASOUND IMAGING | Facility: IMAGING CENTER | Age: 74
End: 2024-03-18

## 2024-03-18 ENCOUNTER — APPOINTMENT (OUTPATIENT)
Dept: MAMMOGRAPHY | Facility: IMAGING CENTER | Age: 74
End: 2024-03-18

## 2024-03-18 ENCOUNTER — APPOINTMENT (OUTPATIENT)
Dept: CT IMAGING | Facility: IMAGING CENTER | Age: 74
End: 2024-03-18

## 2024-04-03 NOTE — PATIENT PROFILE ADULT - FUNCTIONAL SCREEN CURRENT LEVEL: COMMUNICATION, MLM
Pt will consume >/= 75% of estimated nutrient needs via best tolerated route. 
0 = understands/communicates without difficulty

## 2024-04-11 ENCOUNTER — APPOINTMENT (OUTPATIENT)
Dept: CT IMAGING | Facility: IMAGING CENTER | Age: 74
End: 2024-04-11
Payer: COMMERCIAL

## 2024-04-11 ENCOUNTER — APPOINTMENT (OUTPATIENT)
Dept: ULTRASOUND IMAGING | Facility: IMAGING CENTER | Age: 74
End: 2024-04-11
Payer: COMMERCIAL

## 2024-04-11 ENCOUNTER — APPOINTMENT (OUTPATIENT)
Dept: MAMMOGRAPHY | Facility: IMAGING CENTER | Age: 74
End: 2024-04-11
Payer: COMMERCIAL

## 2024-04-11 ENCOUNTER — OUTPATIENT (OUTPATIENT)
Dept: OUTPATIENT SERVICES | Facility: HOSPITAL | Age: 74
LOS: 1 days | End: 2024-04-11
Payer: COMMERCIAL

## 2024-04-11 DIAGNOSIS — Z00.8 ENCOUNTER FOR OTHER GENERAL EXAMINATION: ICD-10-CM

## 2024-04-11 DIAGNOSIS — Z98.890 OTHER SPECIFIED POSTPROCEDURAL STATES: Chronic | ICD-10-CM

## 2024-04-11 DIAGNOSIS — Z95.5 PRESENCE OF CORONARY ANGIOPLASTY IMPLANT AND GRAFT: Chronic | ICD-10-CM

## 2024-04-11 DIAGNOSIS — Z89.429 ACQUIRED ABSENCE OF OTHER TOE(S), UNSPECIFIED SIDE: Chronic | ICD-10-CM

## 2024-04-11 DIAGNOSIS — S98.139A COMPLETE TRAUMATIC AMPUTATION OF ONE UNSPECIFIED LESSER TOE, INITIAL ENCOUNTER: Chronic | ICD-10-CM

## 2024-04-11 DIAGNOSIS — G56.01 CARPAL TUNNEL SYNDROME, RIGHT UPPER LIMB: Chronic | ICD-10-CM

## 2024-04-11 DIAGNOSIS — Z92.3 PERSONAL HISTORY OF IRRADIATION: Chronic | ICD-10-CM

## 2024-04-11 PROCEDURE — 77062 BREAST TOMOSYNTHESIS BI: CPT | Mod: 26

## 2024-04-11 PROCEDURE — 76641 ULTRASOUND BREAST COMPLETE: CPT | Mod: 26,50

## 2024-04-11 PROCEDURE — 71271 CT THORAX LUNG CANCER SCR C-: CPT

## 2024-04-11 PROCEDURE — 77066 DX MAMMO INCL CAD BI: CPT | Mod: 26

## 2024-04-11 PROCEDURE — 71271 CT THORAX LUNG CANCER SCR C-: CPT | Mod: 26

## 2024-04-11 PROCEDURE — G0279: CPT

## 2024-04-11 PROCEDURE — 76641 ULTRASOUND BREAST COMPLETE: CPT

## 2024-04-11 PROCEDURE — 77066 DX MAMMO INCL CAD BI: CPT

## 2024-05-23 NOTE — H&P PST ADULT - RS GEN PE MLT RESP DETAILS PC
normal/airway patent/breath sounds equal/good air movement/respirations non-labored/clear to auscultation bilaterally/no chest wall tenderness/no intercostal retractions/no rales/no rhonchi on unit/security/safe

## 2024-07-05 NOTE — CONSULT NOTE ADULT - SUBJECTIVE AND OBJECTIVE BOX
L/S 10/30/23 Dr SOUSA , Carotid artery plaque, bilateral. ER F/U visit 07/01/24, CTA 7/1/24 - +Emblem     Pt would like to stay in the [place] area for appts. She saw SHANKAR in 2019 and only saw LARS in OCT since no other VS was available. Pt has ASAP ref from ED to see VS, Three-vessel configuration of the aortic arch. 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      HPI:  69 y/o F with PMH of CAD(s/p 5 stents), HTN, HLD, chronic smoker presented with the complaint of left sided chest pain. As per the patient she developed the chest pain about 2 weeks ago and has been intermittent. Patient stated that the chest pain is located in the left side, characterized as a tightness sensation or a twisting sensation, lasting about 1-2 minutes in duration, without any aggravating factors, self alleviating, without any radiation of the pain, with a severity of 7/10. Patient stated that this episode of chest pain is similar to when she had the stents placed. Patient stated that with the chest pain she also had SOB and was very winded even walking 1 block with her grandson. Patient denied any fevers, chills, N/V/D/C, abdominal pain, dysuria, melena, hematochezia, recent travel, sick contact, pleuritic or positional chest pain. S/PCath with TG .  PAST MEDICAL & SURGICAL HISTORY:  Depression  Hyperlipidemia  Hypertension  Bradycardia  Smoker  GERD (gastroesophageal reflux disease)  Reflux gastritis  CAD (coronary artery disease): 5 stents  S/P foot surgery, right: Bunionectomy and hammer toed 2016  Carpal tunnel syndrome, bilateral: b/l wrist surgery for carpal tunnel syndrome  Stented coronary artery: x 5   delivery delivered: x2      Social History: Smoking but denies alcohol or drugs     FAMILY HISTORY:  Family history of Alzheimer's disease: Father  Family history of heart disease (Sibling): Twin sister has cardiac stents  Brother has cardiac stents  Family history of breast cancer (Grandparent): Maternal Grandmother  Family history of myocardial infarction (Grandparent): Paternal Grandmother diet in her 60&#x27;s of MI      Allergies    No Known Allergies    Intolerances        REVIEW OF SYSTEMS:    CONSTITUTIONAL: No fever, weight loss, or fatigue  EYES: No eye pain, visual disturbances, or discharge  RESPIRATORY: No cough, wheezing, chills or hemoptysis; No shortness of breath  CARDIOVASCULAR: chest pain,but no  palpitations, dizziness, or leg swelling  GASTROINTESTINAL: No abdominal or epigastric pain. No nausea, vomiting, or hematemesis; No diarrhea or constipation. No melena or hematochezia.  GENITOURINARY: No dysuria, frequency, hematuria, or incontinence  NEUROLOGICAL: No headaches, memory loss, loss of strength, numbness, or tremors  SKIN: No itching, burning, rashes, or lesions   ENDOCRINE: No heat or cold intolerance; No hair loss  MUSCULOSKELETAL: No joint pain or swelling; No muscle, back, or extremity pain  PSYCHIATRIC: No depression, anxiety, mood swings, or difficulty sleeping      MEDICATIONS  (STANDING):  amLODIPine   Tablet 10 milliGRAM(s) Oral daily  aspirin enteric coated 81 milliGRAM(s) Oral daily  atorvastatin 40 milliGRAM(s) Oral at bedtime  carvedilol 6.25 milliGRAM(s) Oral every 12 hours  citalopram 20 milliGRAM(s) Oral daily  clopidogrel Tablet 75 milliGRAM(s) Oral daily  heparin  Injectable 5000 Unit(s) SubCutaneous every 12 hours  hydrochlorothiazide 25 milliGRAM(s) Oral daily  sodium chloride 0.9%. 500 milliLiter(s) (75 mL/Hr) IV Continuous <Continuous>  valsartan 320 milliGRAM(s) Oral daily    MEDICATIONS  (PRN):  nicotine  Polacrilex Gum 2 milliGRAM(s) Oral every 2 hours PRN breakthrough cravings      Vital Signs Last 24 Hrs  T(C): 36.5 (2018 10:10), Max: 37 (2018 20:11)  T(F): 97.7 (2018 10:10), Max: 98.6 (2018 20:11)  HR: 57 (2018 10:10) (56 - 68)  BP: 123/73 (2018 10:10) (123/73 - 161/88)  BP(mean): --  RR: 14 (2018 10:10) (14 - 21)  SpO2: 100% (2018 10:10) (98% - 100%)    PHYSICAL EXAM:    GENERAL: NAD, well-groomed, well-developed  HEAD:  Atraumatic, Normocephalic  EYES: EOMI, PERRLA, conjunctiva and sclera clear  ENMT: No tonsillar erythema, exudates, or enlargement; Moist mucous membranes, Good dentition, No lesions  NECK: Supple, No JVD, Normal thyroid  NERVOUS SYSTEM:  Alert & Oriented X3, Good concentration; Motor Strength 5/5 B/L upper and lower extremities; DTRs 2+ intact and symmetric  CHEST/LUNG: Clear to percussion bilaterally; No rales, rhonchi, wheezing, or rubs  HEART: Regular rate and rhythm; No murmurs, rubs, or gallops  ABDOMEN: Soft, Nontender, Nondistended; Bowel sounds present  EXTREMITIES:  2+ Peripheral Pulses, No clubbing, cyanosis, or edema  LYMPH: No lymphadenopathy noted  SKIN: No rashes or lesions    LABS:                        12.2   7.62  )-----------( 247      ( 2018 05:00 )             35.6     07-24    135  |  97<L>  |  11  ----------------------------<  90  4.1   |  25  |  0.56    Ca    8.9      2018 05:00  Phos  4.4     07-24  Mg     1.9     -    TPro  7.3  /  Alb  4.2  /  TBili  0.5  /  DBili  x   /  AST  23  /  ALT  17  /  AlkPhos  90  07-    PT/INR - ( 2018 17:45 )   PT: 11.3 SEC;   INR: 0.98                  RADIOLOGY & ADDITIONAL STUDIES:

## 2024-08-02 NOTE — PATIENT PROFILE ADULT - NSPROEDAABILITYLEARN_GEN_A_NUR
Pt is requesting a copy of lab results be mailed to her and she is wanting to know if she needs to schedule a follow up appointment based on her results.  Please advise.    Micah MAXWELL LPN   none

## 2024-09-01 NOTE — ED PROVIDER NOTE - NEURO NEGATIVE STATEMENT, MLM
General no loss of consciousness, no gait abnormality, no headache, no sensory deficits, and no weakness.

## 2024-09-08 NOTE — ASU DISCHARGE PLAN (ADULT/PEDIATRIC) - DISCHARGE PLAN IS COMPLETE AND GIVEN TO PATIENT
"Reason for Disposition   ALSO, mild cold symptoms are present    Answer Assessment - Initial Assessment Questions  1. ONSET: \"When did the cough start?\"       Last tuesday  2. SEVERITY: \"How bad is the cough today?\"       Stayed the same    4. CROUP: \"Is it a barky, croupy cough?\"       denies  5. RESPIRATORY STATUS: \"Describe your child's breathing when he's not coughing. What does it sound like?\" (eg wheezing, stridor, grunting, weak cry, unable to speak, retractions, rapid rate, cyanosis)      denies  6. CHILD'S APPEARANCE: \"How sick is your child acting?\" \" What is he doing right now?\" If asleep, ask: \"How was he acting before he went to sleep?\"       Pt still eating, drinking, peeing ok  7. FEVER: \"Does your child have a fever?\" If so, ask: \"What is it, how was it measured, and when did it start?\"       denies  8. CAUSE: \"What do you think is causing the cough?\" Age 6 months to 4 years, ask:  \"Could he have choked on something?\"      Unsure    Pt also has runny nose    Protocols used: Cough-PEDIATRIC-    " : Yes

## 2024-11-20 ENCOUNTER — OUTPATIENT (OUTPATIENT)
Dept: OUTPATIENT SERVICES | Facility: HOSPITAL | Age: 74
LOS: 1 days | End: 2024-11-20
Payer: COMMERCIAL

## 2024-11-20 ENCOUNTER — APPOINTMENT (OUTPATIENT)
Dept: CT IMAGING | Facility: IMAGING CENTER | Age: 74
End: 2024-11-20

## 2024-11-20 DIAGNOSIS — Z98.890 OTHER SPECIFIED POSTPROCEDURAL STATES: Chronic | ICD-10-CM

## 2024-11-20 DIAGNOSIS — Z89.429 ACQUIRED ABSENCE OF OTHER TOE(S), UNSPECIFIED SIDE: Chronic | ICD-10-CM

## 2024-11-20 DIAGNOSIS — S98.139A COMPLETE TRAUMATIC AMPUTATION OF ONE UNSPECIFIED LESSER TOE, INITIAL ENCOUNTER: Chronic | ICD-10-CM

## 2024-11-20 DIAGNOSIS — G56.01 CARPAL TUNNEL SYNDROME, RIGHT UPPER LIMB: Chronic | ICD-10-CM

## 2024-11-20 DIAGNOSIS — Z95.5 PRESENCE OF CORONARY ANGIOPLASTY IMPLANT AND GRAFT: Chronic | ICD-10-CM

## 2024-11-20 DIAGNOSIS — Z92.3 PERSONAL HISTORY OF IRRADIATION: Chronic | ICD-10-CM

## 2024-11-20 DIAGNOSIS — Z00.8 ENCOUNTER FOR OTHER GENERAL EXAMINATION: ICD-10-CM

## 2024-11-20 PROCEDURE — 76376 3D RENDER W/INTRP POSTPROCES: CPT | Mod: 26

## 2024-11-20 PROCEDURE — 73700 CT LOWER EXTREMITY W/O DYE: CPT

## 2024-11-20 PROCEDURE — 73700 CT LOWER EXTREMITY W/O DYE: CPT | Mod: 26,LT

## 2024-11-20 PROCEDURE — 76376 3D RENDER W/INTRP POSTPROCES: CPT

## 2025-01-14 ENCOUNTER — INPATIENT (INPATIENT)
Facility: HOSPITAL | Age: 75
LOS: 1 days | Discharge: HOME | End: 2025-01-16
Attending: HOSPITALIST | Admitting: HOSPITALIST
Payer: COMMERCIAL

## 2025-01-14 VITALS
HEART RATE: 61 BPM | SYSTOLIC BLOOD PRESSURE: 147 MMHG | WEIGHT: 169.98 LBS | TEMPERATURE: 98 F | RESPIRATION RATE: 16 BRPM | OXYGEN SATURATION: 99 % | DIASTOLIC BLOOD PRESSURE: 69 MMHG

## 2025-01-14 DIAGNOSIS — I25.10 ATHEROSCLEROTIC HEART DISEASE OF NATIVE CORONARY ARTERY WITHOUT ANGINA PECTORIS: ICD-10-CM

## 2025-01-14 LAB
ALBUMIN SERPL ELPH-MCNC: 4.1 G/DL — SIGNIFICANT CHANGE UP (ref 3.3–5)
ALP SERPL-CCNC: 111 U/L — SIGNIFICANT CHANGE UP (ref 40–120)
ALT FLD-CCNC: 16 U/L — SIGNIFICANT CHANGE UP (ref 4–33)
ANION GAP SERPL CALC-SCNC: 13 MMOL/L — SIGNIFICANT CHANGE UP (ref 7–14)
APTT BLD: 31.3 SEC — SIGNIFICANT CHANGE UP (ref 24.5–35.6)
AST SERPL-CCNC: 26 U/L — SIGNIFICANT CHANGE UP (ref 4–32)
BASOPHILS # BLD AUTO: 0.06 K/UL — SIGNIFICANT CHANGE UP (ref 0–0.2)
BASOPHILS NFR BLD AUTO: 0.6 % — SIGNIFICANT CHANGE UP (ref 0–2)
BILIRUB SERPL-MCNC: 0.5 MG/DL — SIGNIFICANT CHANGE UP (ref 0.2–1.2)
BUN SERPL-MCNC: 18 MG/DL — SIGNIFICANT CHANGE UP (ref 7–23)
CALCIUM SERPL-MCNC: 9.8 MG/DL — SIGNIFICANT CHANGE UP (ref 8.4–10.5)
CHLORIDE SERPL-SCNC: 96 MMOL/L — LOW (ref 98–107)
CO2 SERPL-SCNC: 23 MMOL/L — SIGNIFICANT CHANGE UP (ref 22–31)
CREAT SERPL-MCNC: 0.76 MG/DL — SIGNIFICANT CHANGE UP (ref 0.5–1.3)
EGFR: 83 ML/MIN/1.73M2 — SIGNIFICANT CHANGE UP
EOSINOPHIL # BLD AUTO: 0.24 K/UL — SIGNIFICANT CHANGE UP (ref 0–0.5)
EOSINOPHIL NFR BLD AUTO: 2.5 % — SIGNIFICANT CHANGE UP (ref 0–6)
GLUCOSE SERPL-MCNC: 99 MG/DL — SIGNIFICANT CHANGE UP (ref 70–99)
HCT VFR BLD CALC: 37 % — SIGNIFICANT CHANGE UP (ref 34.5–45)
HGB BLD-MCNC: 13 G/DL — SIGNIFICANT CHANGE UP (ref 11.5–15.5)
IANC: 5.89 K/UL — SIGNIFICANT CHANGE UP (ref 1.8–7.4)
IMM GRANULOCYTES NFR BLD AUTO: 0.4 % — SIGNIFICANT CHANGE UP (ref 0–0.9)
INR BLD: 0.95 RATIO — SIGNIFICANT CHANGE UP (ref 0.85–1.16)
LYMPHOCYTES # BLD AUTO: 2.14 K/UL — SIGNIFICANT CHANGE UP (ref 1–3.3)
LYMPHOCYTES # BLD AUTO: 22.7 % — SIGNIFICANT CHANGE UP (ref 13–44)
MCHC RBC-ENTMCNC: 33.9 PG — SIGNIFICANT CHANGE UP (ref 27–34)
MCHC RBC-ENTMCNC: 35.1 G/DL — SIGNIFICANT CHANGE UP (ref 32–36)
MCV RBC AUTO: 96.6 FL — SIGNIFICANT CHANGE UP (ref 80–100)
MONOCYTES # BLD AUTO: 1.07 K/UL — HIGH (ref 0–0.9)
MONOCYTES NFR BLD AUTO: 11.3 % — SIGNIFICANT CHANGE UP (ref 2–14)
NEUTROPHILS # BLD AUTO: 5.89 K/UL — SIGNIFICANT CHANGE UP (ref 1.8–7.4)
NEUTROPHILS NFR BLD AUTO: 62.5 % — SIGNIFICANT CHANGE UP (ref 43–77)
NRBC # BLD: 0 /100 WBCS — SIGNIFICANT CHANGE UP (ref 0–0)
NRBC # FLD: 0 K/UL — SIGNIFICANT CHANGE UP (ref 0–0)
NT-PROBNP SERPL-SCNC: 421 PG/ML — HIGH
PLATELET # BLD AUTO: 315 K/UL — SIGNIFICANT CHANGE UP (ref 150–400)
POTASSIUM SERPL-MCNC: 3.8 MMOL/L — SIGNIFICANT CHANGE UP (ref 3.5–5.3)
POTASSIUM SERPL-SCNC: 3.8 MMOL/L — SIGNIFICANT CHANGE UP (ref 3.5–5.3)
PROT SERPL-MCNC: 7.6 G/DL — SIGNIFICANT CHANGE UP (ref 6–8.3)
PROTHROM AB SERPL-ACNC: 11.3 SEC — SIGNIFICANT CHANGE UP (ref 9.9–13.4)
RBC # BLD: 3.83 M/UL — SIGNIFICANT CHANGE UP (ref 3.8–5.2)
RBC # FLD: 12.3 % — SIGNIFICANT CHANGE UP (ref 10.3–14.5)
SODIUM SERPL-SCNC: 132 MMOL/L — LOW (ref 135–145)
TROPONIN T, HIGH SENSITIVITY RESULT: 22 NG/L — SIGNIFICANT CHANGE UP
WBC # BLD: 9.44 K/UL — SIGNIFICANT CHANGE UP (ref 3.8–10.5)
WBC # FLD AUTO: 9.44 K/UL — SIGNIFICANT CHANGE UP (ref 3.8–10.5)

## 2025-01-14 NOTE — H&P ADULT - PROBLEM/PLAN-2
[NL] : soft, nontender, nondistended, normal bowel sounds, no hepatosplenomegaly [FreeTextEntry5] : no swelling, erythema, discharge DISPLAY PLAN FREE TEXT

## 2025-01-14 NOTE — ED ADULT TRIAGE NOTE - CHIEF COMPLAINT QUOTE
Pt referred to ED for admission for cardiac catheterization tomorrow. Pt endorses chest heaviness for the past 3 days, was evaluated by cardiologist and instructed to come to ED for further monitoring, + shortness of breath, + smoker. History of multiple stent placement(on Xarelto)

## 2025-01-14 NOTE — H&P ADULT - PROBLEM SELECTOR PLAN 3
-given PVCs seen at cardiologist's office today, cardiologist intended to start on 25 toprol qhs; current HR in mid-upper 50s, will hold for now  -k 3.8, ca wnl, Mg add-on ordered

## 2025-01-14 NOTE — H&P ADULT - NSHPLABSRESULTS_GEN_ALL_CORE
13.0   9.44  )-----------( 315      ( 14 Jan 2025 22:20 )             37.0     01-14    132[L]  |  96[L]  |  18  ----------------------------<  99  3.8   |  23  |  0.76    Ca    9.8      14 Jan 2025 22:20    TPro  7.6  /  Alb  4.1  /  TBili  0.5  /  DBili  x   /  AST  26  /  ALT  16  /  AlkPhos  111  01-14    CAPILLARY BLOOD GLUCOSE        PT/INR - ( 14 Jan 2025 22:20 )   PT: 11.3 sec;   INR: 0.95 ratio         PTT - ( 14 Jan 2025 22:20 )  PTT:31.3 sec  Urinalysis Basic - ( 14 Jan 2025 22:20 )    Color: x / Appearance: x / SG: x / pH: x  Gluc: 99 mg/dL / Ketone: x  / Bili: x / Urobili: x   Blood: x / Protein: x / Nitrite: x   Leuk Esterase: x / RBC: x / WBC x   Sq Epi: x / Non Sq Epi: x / Bacteria: x      Vital Signs Last 24 Hrs  T(C): 36.5 (15 Jonathan 2025 01:44), Max: 36.9 (14 Jan 2025 19:37)  T(F): 97.7 (15 Jonathan 2025 01:44), Max: 98.4 (14 Jan 2025 19:37)  HR: 70 (15 Jonathan 2025 01:44) (61 - 71)  BP: 123/57 (15 Jonathan 2025 01:44) (123/57 - 148/76)  BP(mean): 100 (14 Jan 2025 23:05) (100 - 100)  RR: 18 (15 Jonathan 2025 01:44) (16 - 19)  SpO2: 98% (15 Jonathan 2025 01:44) (95% - 99%)    Parameters below as of 15 Jonathan 2025 01:44  Patient On (Oxygen Delivery Method): room air

## 2025-01-14 NOTE — H&P ADULT - HIV OFFER
Date & Time: 9/30/2021, 11:00 AM  Patient: Faith Carrier  Encounter Provider(s):    JYOTI Pierce       To Whom It May Concern:    Jeana Pena was seen and treated in our department on 9/30/2021.  He should not return to school until 10/4/2
Opt out

## 2025-01-14 NOTE — H&P ADULT - HISTORY OF PRESENT ILLNESS
74 year old Female with history of HTN, CAD hx remote RCA stent, s/p LCx stent in July of 2018, last [facility] 1/2019 with patent stents, 74 year old Female with history of HTN, CAD hx remote RCA stent, s/p LCx stent in July of 2018, last [facility] 1/2019 with patent stents, [phone][facility] in setting of intermittent nonexertional left sided chest and substernal pain  for last week associated with bouts of palpitations and dizziness. report similar symptoms prior to previous stents. Current smoker, complaint with meds. Pt currently denies cp, or sob at time of my exam. Notes right shoulder and arm pain for last 2 weeks. HR max 71. Pt appears to be on low-oleary Xarelto in combination with aspirin for h/o cad/pci. ekg nsr, pvcs. trop 22, prelim cxr non-acute 74 year old Female with history of HTN, CAD hx remote RCA stent, s/p LCx stent in July of 2018, last [facility] 1/2019 with patent stents, [facility] [phone][facility] in setting of intermittent nonexertional left sided chest and substernal pain  for last week associated with bouts of palpitations and dizziness. Reports  similar symptoms prior to previous stents. Current smoker, complaint with meds. Pt currently denies cp, or sob at time of my exam. Notes right shoulder and arm pain for last 2 weeks. HR max 71. Pt appears to be on low-oleary Xarelto in combination with aspirin for h/o cad/pci. ekg nsr, pvcs. trop 22,18 prelim cxr non-acute

## 2025-01-14 NOTE — H&P ADULT - NSHPREVIEWOFSYSTEMS_GEN_ALL_CORE
Review of Systems:   CONSTITUTIONAL: No fever  EYES: No eye pain, visual disturbances, or discharge  ENMT:  No difficulty hearing, tinnitus, vertigo; No sinus or throat pain  NECK: No pain or stiffness  RESPIRATORY: No cough, wheezing, chills or hemoptysis; No shortness of breath  CARDIOVASCULAR: No current chest pain, palpitations, dizziness  GASTROINTESTINAL: No abdominal or epigastric pain. No nausea, vomiting, or hematemesis; No diarrhea or constipation. No melena or hematochezia.  GENITOURINARY: No dysuria  NEUROLOGICAL: No headache  MUSCULOSKELETAL: No joint pain or swelling; No muscle, back, or extremity pain Review of Systems:   CONSTITUTIONAL: No fever  EYES: No eye pain, visual disturbances, or discharge  ENMT:  No difficulty hearing, tinnitus, vertigo; No sinus or throat pain  NECK: No pain or stiffness  RESPIRATORY: No cough, wheezing, chills or hemoptysis; No shortness of breath  CARDIOVASCULAR: No current chest pain, palpitations, dizziness  GASTROINTESTINAL: No abdominal or epigastric pain. No nausea, vomiting, or hematemesis; No diarrhea or constipation. No melena or hematochezia.  GENITOURINARY: No dysuria  NEUROLOGICAL: No headache  MUSCULOSKELETAL: right arm pain

## 2025-01-14 NOTE — H&P ADULT - NSICDXPASTMEDICALHX_GEN_ALL_CORE_FT
PAST MEDICAL HISTORY:  CAD S/P percutaneous coronary angioplasty     HLD (hyperlipidemia)     HTN (hypertension)

## 2025-01-14 NOTE — ED PROVIDER NOTE - CLINICAL SUMMARY MEDICAL DECISION MAKING FREE TEXT BOX
The pt has a strong hx of CAD with multiple stents who has been having unstable angina symptoms for th past three days. Will order ecg, chest xray, cardiac enzymes and monitoring to be admitted for cardiac catheterization.

## 2025-01-14 NOTE — H&P ADULT - NSHPPHYSICALEXAM_GEN_ALL_CORE
PHYSICAL EXAM:      Constitutional: NAD, well-groomed, well-developed  HEENT: EOMI, Normal Hearing  Neck: No LAD, No JVD  Back: Normal spine flexure, No CVA tenderness  Respiratory: CTAB  Cardiovascular: S1 and S2, RRR  Gastrointestinal: BS+, soft, NT/ND  Extremities: mild peripheral edema  Vascular: 2+ peripheral pulses  Neurological: A/O x 3, no focal deficits  Psychiatric: Normal mood, normal affect  Musculoskeletal: 5/5 strength b/l upper and lower extremities  Skin: No rashes

## 2025-01-14 NOTE — H&P ADULT - PROBLEM SELECTOR PLAN 1
-will need cardiology called in am for possible non-urgent LHC given aforementioned week long symptoms   -c/w aspirin, low dose xarelto, statin, arb. Pt not on BB currently but given PVCs seen at cardiologist's office today, cardiologist intended to start on 25 toprol qhs; current HR in mid-upper 50s, will hold for now  -tele, tte    -unclear if carotid duplex performed at office today for dizziness eval; would call to conform, if not, would order here  -given right arm/shoulder pain for last 2 weeks, will order plain film and arm duplex -will need cardiology called in am for possible non-urgent LHC given aforementioned week long symptoms   -c/w aspirin, low dose xarelto, statin, arb. Pt not on BB currently but given PVCs seen at cardiologist's office today, cardiologist intended to start on 25 toprol qhs; current HR in mid-upper 50s, will hold for now  -tele, tte, orthostatics , fall precautions     -unclear if carotid duplex performed at office today for dizziness eval; would call to conform, if not, would order here  -given right arm/shoulder pain for last 2 weeks, will order plain film and arm duplex -will need cardiology called in am for possible non-urgent LHC given aforementioned week long symptoms   -c/w aspirin, low dose xarelto, statin, arb. Pt not on BB currently but given PVCs seen at cardiologist's office today, cardiologist intended to start on 25 toprol qhs; current HR in mid-upper 50s, will hold for now  -tele, tte, orthostatics , fall precautions     -unclear if carotid duplex performed at office today for dizziness eval; would call to conform, if not, would order here  -given right arm/shoulder pain for last 2 weeks, will order plain film and arm duplex; denies tingling, numbness, weakness

## 2025-01-14 NOTE — ED PROVIDER NOTE - OBJECTIVE STATEMENT
75 yo F with PMHx HTN, HLP, and CAD with 6 stents on xarelto referred to ED by Dr. Pelayo for admission for cardiac catheterization tomorrow. Pt endorses chest pressure intermittently, worsening dyspnea on exertion and generalized weakness. The pt is a 1ppd smoker. Denies f/c, n/v, abdominal pain, paresthesias.

## 2025-01-15 ENCOUNTER — RESULT REVIEW (OUTPATIENT)
Age: 75
End: 2025-01-15

## 2025-01-15 DIAGNOSIS — I49.3 VENTRICULAR PREMATURE DEPOLARIZATION: ICD-10-CM

## 2025-01-15 DIAGNOSIS — I10 ESSENTIAL (PRIMARY) HYPERTENSION: ICD-10-CM

## 2025-01-15 DIAGNOSIS — Z79.899 OTHER LONG TERM (CURRENT) DRUG THERAPY: ICD-10-CM

## 2025-01-15 DIAGNOSIS — I20.0 UNSTABLE ANGINA: ICD-10-CM

## 2025-01-15 DIAGNOSIS — Z29.9 ENCOUNTER FOR PROPHYLACTIC MEASURES, UNSPECIFIED: ICD-10-CM

## 2025-01-15 LAB
ADD ON TEST-SPECIMEN IN LAB: SIGNIFICANT CHANGE UP
ANION GAP SERPL CALC-SCNC: 12 MMOL/L — SIGNIFICANT CHANGE UP (ref 7–14)
BASOPHILS # BLD AUTO: 0.04 K/UL — SIGNIFICANT CHANGE UP (ref 0–0.2)
BASOPHILS NFR BLD AUTO: 0.7 % — SIGNIFICANT CHANGE UP (ref 0–2)
BUN SERPL-MCNC: 14 MG/DL — SIGNIFICANT CHANGE UP (ref 7–23)
CALCIUM SERPL-MCNC: 8.9 MG/DL — SIGNIFICANT CHANGE UP (ref 8.4–10.5)
CHLORIDE SERPL-SCNC: 98 MMOL/L — SIGNIFICANT CHANGE UP (ref 98–107)
CO2 SERPL-SCNC: 23 MMOL/L — SIGNIFICANT CHANGE UP (ref 22–31)
CREAT SERPL-MCNC: 0.57 MG/DL — SIGNIFICANT CHANGE UP (ref 0.5–1.3)
EGFR: 96 ML/MIN/1.73M2 — SIGNIFICANT CHANGE UP
EOSINOPHIL # BLD AUTO: 0.25 K/UL — SIGNIFICANT CHANGE UP (ref 0–0.5)
EOSINOPHIL NFR BLD AUTO: 4.3 % — SIGNIFICANT CHANGE UP (ref 0–6)
GLUCOSE SERPL-MCNC: 121 MG/DL — HIGH (ref 70–99)
HCT VFR BLD CALC: 33.6 % — LOW (ref 34.5–45)
HGB BLD-MCNC: 11.7 G/DL — SIGNIFICANT CHANGE UP (ref 11.5–15.5)
IANC: 3.42 K/UL — SIGNIFICANT CHANGE UP (ref 1.8–7.4)
IMM GRANULOCYTES NFR BLD AUTO: 0.3 % — SIGNIFICANT CHANGE UP (ref 0–0.9)
LYMPHOCYTES # BLD AUTO: 1.29 K/UL — SIGNIFICANT CHANGE UP (ref 1–3.3)
LYMPHOCYTES # BLD AUTO: 22 % — SIGNIFICANT CHANGE UP (ref 13–44)
MAGNESIUM SERPL-MCNC: 1.6 MG/DL — SIGNIFICANT CHANGE UP (ref 1.6–2.6)
MAGNESIUM SERPL-MCNC: 1.7 MG/DL — SIGNIFICANT CHANGE UP (ref 1.6–2.6)
MCHC RBC-ENTMCNC: 33.6 PG — SIGNIFICANT CHANGE UP (ref 27–34)
MCHC RBC-ENTMCNC: 34.8 G/DL — SIGNIFICANT CHANGE UP (ref 32–36)
MCV RBC AUTO: 96.6 FL — SIGNIFICANT CHANGE UP (ref 80–100)
MONOCYTES # BLD AUTO: 0.85 K/UL — SIGNIFICANT CHANGE UP (ref 0–0.9)
MONOCYTES NFR BLD AUTO: 14.5 % — HIGH (ref 2–14)
NEUTROPHILS # BLD AUTO: 3.42 K/UL — SIGNIFICANT CHANGE UP (ref 1.8–7.4)
NEUTROPHILS NFR BLD AUTO: 58.2 % — SIGNIFICANT CHANGE UP (ref 43–77)
NRBC # BLD: 0 /100 WBCS — SIGNIFICANT CHANGE UP (ref 0–0)
NRBC # FLD: 0 K/UL — SIGNIFICANT CHANGE UP (ref 0–0)
PHOSPHATE SERPL-MCNC: 3.4 MG/DL — SIGNIFICANT CHANGE UP (ref 2.5–4.5)
PLATELET # BLD AUTO: 273 K/UL — SIGNIFICANT CHANGE UP (ref 150–400)
POTASSIUM SERPL-MCNC: 3.9 MMOL/L — SIGNIFICANT CHANGE UP (ref 3.5–5.3)
POTASSIUM SERPL-SCNC: 3.9 MMOL/L — SIGNIFICANT CHANGE UP (ref 3.5–5.3)
RBC # BLD: 3.48 M/UL — LOW (ref 3.8–5.2)
RBC # FLD: 12.5 % — SIGNIFICANT CHANGE UP (ref 10.3–14.5)
SODIUM SERPL-SCNC: 133 MMOL/L — LOW (ref 135–145)
TROPONIN T, HIGH SENSITIVITY RESULT: 18 NG/L — SIGNIFICANT CHANGE UP
WBC # BLD: 5.87 K/UL — SIGNIFICANT CHANGE UP (ref 3.8–10.5)
WBC # FLD AUTO: 5.87 K/UL — SIGNIFICANT CHANGE UP (ref 3.8–10.5)

## 2025-01-15 RX ORDER — CITALOPRAM HYDROBROMIDE 40 MG
1 TABLET ORAL
Refills: 0 | DISCHARGE

## 2025-01-15 RX ORDER — ATORVASTATIN CALCIUM 40 MG/1
1 TABLET, FILM COATED ORAL
Refills: 0 | DISCHARGE

## 2025-01-15 RX ORDER — RIVAROXABAN 2.5 MG/1
1 TABLET, FILM COATED ORAL
Refills: 0 | DISCHARGE

## 2025-01-15 RX ORDER — ATORVASTATIN CALCIUM 40 MG/1
40 TABLET, FILM COATED ORAL AT BEDTIME
Refills: 0 | Status: DISCONTINUED | OUTPATIENT
Start: 2025-01-15 | End: 2025-01-16

## 2025-01-15 RX ORDER — ASPIRIN 81 MG
81 TABLET, DELAYED RELEASE (ENTERIC COATED) ORAL DAILY
Refills: 0 | Status: DISCONTINUED | OUTPATIENT
Start: 2025-01-15 | End: 2025-01-16

## 2025-01-15 RX ORDER — CITALOPRAM HYDROBROMIDE 40 MG
40 TABLET ORAL DAILY
Refills: 0 | Status: DISCONTINUED | OUTPATIENT
Start: 2025-01-15 | End: 2025-01-16

## 2025-01-15 RX ORDER — PANTOPRAZOLE 40 MG/1
40 TABLET, DELAYED RELEASE ORAL
Refills: 0 | Status: DISCONTINUED | OUTPATIENT
Start: 2025-01-15 | End: 2025-01-16

## 2025-01-15 RX ORDER — ASPIRIN 81 MG
0 TABLET, DELAYED RELEASE (ENTERIC COATED) ORAL
Refills: 0 | DISCHARGE

## 2025-01-15 RX ORDER — RIVAROXABAN 2.5 MG/1
2.5 TABLET, FILM COATED ORAL
Refills: 0 | Status: DISCONTINUED | OUTPATIENT
Start: 2025-01-15 | End: 2025-01-15

## 2025-01-15 RX ORDER — VALSARTAN 80 MG/1
320 TABLET ORAL DAILY
Refills: 0 | Status: DISCONTINUED | OUTPATIENT
Start: 2025-01-15 | End: 2025-01-16

## 2025-01-15 RX ORDER — VALSARTAN AND HYDROCHLOROTHIAZIDE 25; 160 MG/1; MG/1
1 TABLET, FILM COATED ORAL
Refills: 0 | DISCHARGE

## 2025-01-15 RX ADMIN — VALSARTAN 320 MILLIGRAM(S): 80 TABLET ORAL at 06:50

## 2025-01-15 RX ADMIN — ATORVASTATIN CALCIUM 40 MILLIGRAM(S): 40 TABLET, FILM COATED ORAL at 23:14

## 2025-01-15 RX ADMIN — PANTOPRAZOLE 40 MILLIGRAM(S): 40 TABLET, DELAYED RELEASE ORAL at 06:51

## 2025-01-15 RX ADMIN — Medication 81 MILLIGRAM(S): at 12:46

## 2025-01-15 RX ADMIN — RIVAROXABAN 2.5 MILLIGRAM(S): 2.5 TABLET, FILM COATED ORAL at 06:50

## 2025-01-15 RX ADMIN — Medication 5 MILLIGRAM(S): at 06:50

## 2025-01-15 RX ADMIN — Medication 40 MILLIGRAM(S): at 12:46

## 2025-01-15 NOTE — CONSULT NOTE ADULT - SUBJECTIVE AND OBJECTIVE BOX
C A R D I O L O G Y  *********************    DATE OF SERVICE: 01-15-25    HISTORY OF PRESENT ILLNESS: HPI:  74 year old Female with history of HTN, CAD hx remote RCA stent, s/p LCx stent in July of 2018, last [facility] 1/2019 with patent stents, Pt sent from her cardiologists office today (Dr Sally Pelayo-[facility] [phone]) in setting of intermittent nonexertional left sided chest and substernal pain  for last week associated with bouts of palpitations and dizziness. Reports  similar symptoms prior to previous stents. Current smoker, complaint with meds. Pt currently denies cp, or sob at time of my exam. Notes right shoulder and arm pain for last 2 weeks. HR max 71. Pt appears to be on low-oleary Xarelto in combination with aspirin for h/o cad/pci. ekg nsr, pvcs. trop 22,18 prelim cxr non-acute (14 Jan 2025 23:47)      PAST MEDICAL & SURGICAL HISTORY:  CAD S/P percutaneous coronary angioplasty      HTN (hypertension)      HLD (hyperlipidemia)              MEDICATIONS:  MEDICATIONS  (STANDING):  amLODIPine   Tablet 5 milliGRAM(s) Oral daily  aspirin enteric coated 81 milliGRAM(s) Oral daily  atorvastatin 40 milliGRAM(s) Oral at bedtime  citalopram 40 milliGRAM(s) Oral daily  hydrochlorothiazide 25 milliGRAM(s) Oral daily  pantoprazole    Tablet 40 milliGRAM(s) Oral before breakfast  valsartan 320 milliGRAM(s) Oral daily      Allergies    No Known Allergies    Intolerances        FAMILY HISTORY:    Non-contributary for premature coronary disease or sudden cardiac death    SOCIAL HISTORY:    [ ] Non-smoker  [ ] Smoker  [ ] Alcohol    FLU VACCINE THIS YEAR STARTS IN AUGUST:  [ ] Yes    [ ] No    IF OVER 65 HAVE YOU EVER HAD A PNA VACCINE:  [ ] Yes    [ ] No       [ ] N/A      REVIEW OF SYSTEMS:  [ ]chest pain  [  ]shortness of breath  [  ]palpitations  [  ]syncope  [ ]near syncope [ ]upper extremity weakness   [ ] lower extremity weakness  [  ]diplopia  [  ]altered mental status   [  ]fevers  [ ]chills [ ]nausea  [ ]vomiting  [  ]dysphagia    [ ]abdominal pain  [ ]melena  [ ]BRBPR    [  ]epistaxis  [  ]rash    [ ]lower extremity edema        [X] All others negative	  [ ] Unable to obtain      LABS:	 	    CARDIAC MARKERS:                              13.0   9.44  )-----------( 315      ( 14 Jan 2025 22:20 )             37.0     Hb Trend: 13.0<--    01-14    132[L]  |  96[L]  |  18  ----------------------------<  99  3.8   |  23  |  0.76    Ca    9.8      14 Jan 2025 22:20  Mg     1.70     01-15    TPro  7.6  /  Alb  4.1  /  TBili  0.5  /  DBili  x   /  AST  26  /  ALT  16  /  AlkPhos  111  01-14    Creatinine Trend: 0.76<--    Coags:  PT/INR - ( 14 Jan 2025 22:20 )   PT: 11.3 sec;   INR: 0.95 ratio         PTT - ( 14 Jan 2025 22:20 )  PTT:31.3 sec    proBNP:   Lipid Profile:   HgA1c:   TSH:         PHYSICAL EXAM:  T(C): 36.8 (01-15-25 @ 06:45), Max: 36.9 (01-14-25 @ 19:37)  HR: 63 (01-15-25 @ 06:45) (57 - 71)  BP: 170/67 (01-15-25 @ 06:45) (123/57 - 170/67)  RR: 16 (01-15-25 @ 06:45) (16 - 19)  SpO2: 98% (01-15-25 @ 06:45) (95% - 99%)  Wt(kg): --     I&O's Summary      Gen: NAD  HEENT:  (-)icterus (-)pallor  CV: N S1 S2 1/6 GAMALIEL (+)2 Pulses B/l  Resp:  Clear to auscultation B/L, normal effort  GI: (+) BS Soft, NT, ND  Lymph:  (-)Edema, (-)obvious lymphadenopathy  Skin: Warm to touch, Normal turgor  Psych: Appropriate mood and affect      TELEMETRY: 	      ECG:  	    RADIOLOGY:         CXR:     ASSESSMENT/PLAN: 	74y Female     C A R D I O L O G Y  *********************    DATE OF SERVICE: 01-15-25    HISTORY OF PRESENT ILLNESS: HPI:    74 year old Female with history of HTN, CAD hx remote RCA stent, s/p LCx stent in July of 2018, last [facility] 1/2019 with patent stents, Pt sent from her cardiologists office today (Dr Sally Pelayo-[facility] [phone]) in setting of intermittent nonexertional left sided chest and substernal pain  for last week associated with bouts of palpitations and dizziness. Reports  similar symptoms prior to previous stents. Current smoker, complaint with meds. Pt currently denies cp, or sob at time of my exam. Notes right shoulder and arm pain for last 2 weeks. HR max 71. Pt appears to be on low-oleary Xarelto in combination with aspirin for h/o cad/pci. ekg nsr, pvcs. trop 22,18 prelim cxr non-acute (14 Jan 2025 23:47)    currently no chest pain or SOB, resting in bed. Hx obtained from this chart and her other MRN.      PAST MEDICAL & SURGICAL HISTORY:  CAD S/P percutaneous coronary angioplasty      HTN (hypertension)      HLD (hyperlipidemia)      MEDICATIONS:  MEDICATIONS  (STANDING):  amLODIPine   Tablet 5 milliGRAM(s) Oral daily  aspirin enteric coated 81 milliGRAM(s) Oral daily  atorvastatin 40 milliGRAM(s) Oral at bedtime  citalopram 40 milliGRAM(s) Oral daily  hydrochlorothiazide 25 milliGRAM(s) Oral daily  pantoprazole    Tablet 40 milliGRAM(s) Oral before breakfast  valsartan 320 milliGRAM(s) Oral daily      Allergies  No Known Allergies      FAMILY HISTORY:  Noncontributory for premature coronary disease or sudden cardiac death    SOCIAL HISTORY:    [x ] Non-smoker  [ ] Smoker  [ ] Alcohol    FLU VACCINE THIS YEAR STARTS IN AUGUST:  [ ] Yes    [ ] No    IF OVER 65 HAVE YOU EVER HAD A PNA VACCINE:  [ ] Yes    [ ] No       [ ] N/A      REVIEW OF SYSTEMS:  [x ]chest pain  [  ]shortness of breath  [  ]palpitations  [  ]syncope  [ ]near syncope [ ]upper extremity weakness   [ ] lower extremity weakness  [  ]diplopia  [  ]altered mental status   [  ]fevers  [ ]chills [ ]nausea  [ ]vomiting  [  ]dysphagia    [ ]abdominal pain  [ ]melena  [ ]BRBPR    [  ]epistaxis  [  ]rash    [ ]lower extremity edema        [X] All others negative	  [ ] Unable to obtain      LABS:	 	    CARDIAC MARKERS:  Troponin T, High Sensitivity Result: 22, 18                          13.0   9.44  )-----------( 315      ( 14 Jan 2025 22:20 )             37.0    132[L]  |  96[L]  |  18  ----------------------------<  99  3.8   |  23  |  0.76    Ca    9.8      14 Jan 2025 22:20  Mg     1.70     01-15    TPro  7.6  /  Alb  4.1  /  TBili  0.5  /  DBili  x   /  AST  26  /  ALT  16  /  AlkPhos  111  01-14    Creatinine Trend: 0.76<--    Coags:  PT/INR - ( 14 Jan 2025 22:20 )   PT: 11.3 sec;   INR: 0.95 ratio      PTT - ( 14 Jan 2025 22:20 )  PTT:31.3 sec      PHYSICAL EXAM:  T(C): 36.8 (01-15-25 @ 06:45), Max: 36.9 (01-14-25 @ 19:37)  HR: 63 (01-15-25 @ 06:45) (57 - 71)  BP: 170/67 (01-15-25 @ 06:45) (123/57 - 170/67)  RR: 16 (01-15-25 @ 06:45) (16 - 19)  SpO2: 98% (01-15-25 @ 06:45) (95% - 99%)      Gen: NAD  HEENT:  (-)icterus (-)pallor  CV: N S1 S2 1/6 GAMALIEL (+)2 Pulses B/l  Resp:  Clear to auscultation B/L, normal effort  GI: (+) BS Soft, NT, ND  Lymph:  (-)Edema, (-)obvious lymphadenopathy  Skin: Warm to touch, Normal turgor  Psych: Appropriate mood and affect      TELEMETRY: 	  SR    ECG:  	pending    [facility] 6/2019:  DIAGNOSTIC RECOMMENDATIONS: Coronary angiogram demonstrates moderate  atherosclerosis in the RCA that was not physiologically significant by IFR  (IFR 0.98). Medical management and risk factor modification is  recommended.      ASSESSMENT/PLAN: 74 year old Female with history of HTN, historically preserved LV function, CAD hx remote RCA stent, s/p LCx stent in July of 2018, last LHC 1/2019 with patent stents, sent from her cardiologists office Dr Sally Pelayo, in setting of intermittent nonexertional left sided chest and substernal pain for last week associated with bouts of palpitations and dizziness    --ACS ruled out  --hold Xarelto  --plan for [facility] 1/16 to eval for obs CAD  --cont ASA 81 and statin for hx CAD  --cont Norvasc, HCTZ and Valsartan for HTN    further reccs pending hospital course  Thank you for allowing us to participate in the care of our mutual patient.  Please do not hesitate to call with any questions.     Meg MURILLO  [phone]                  C A R D I O L O G Y  *********************    DATE OF SERVICE: 01-15-25    HISTORY OF PRESENT ILLNESS: HPI:    74 year old Female with history of HTN, CAD hx remote RCA stent, s/p LCx stent in July of 2018, last [facility] 1/2019 with patent stents, Pt sent from Sally Pelayo in setting of intermittent nonexertional left sided chest and substernal pain  for last week associated with bouts of palpitations and dizziness. Reports  similar symptoms prior to previous stents. Current smoker, complaint with meds. Pt currently denies cp, or sob at time of my exam. Notes right shoulder and arm pain for last 2 weeks. HR max 71. Pt appears to be on low-oleary Xarelto in combination with aspirin for h/o cad/pci. ekg nsr, pvcs. trop 22,18 prelim cxr non-acute (14 Jan 2025 23:47)    currently no chest pain or SOB, resting in bed. Hx obtained from this chart and her other MRN.      PAST MEDICAL & SURGICAL HISTORY:  CAD S/P percutaneous coronary angioplasty      HTN (hypertension)      HLD (hyperlipidemia)      MEDICATIONS:  MEDICATIONS  (STANDING):  amLODIPine   Tablet 5 milliGRAM(s) Oral daily  aspirin enteric coated 81 milliGRAM(s) Oral daily  atorvastatin 40 milliGRAM(s) Oral at bedtime  citalopram 40 milliGRAM(s) Oral daily  hydrochlorothiazide 25 milliGRAM(s) Oral daily  pantoprazole    Tablet 40 milliGRAM(s) Oral before breakfast  valsartan 320 milliGRAM(s) Oral daily      Allergies  No Known Allergies      FAMILY HISTORY:  Noncontributory for premature coronary disease or sudden cardiac death    SOCIAL HISTORY:    [x ] Non-smoker  [ ] Smoker  [ ] Alcohol    FLU VACCINE THIS YEAR STARTS IN AUGUST:  [ ] Yes    [ ] No    IF OVER 65 HAVE YOU EVER HAD A PNA VACCINE:  [ ] Yes    [ ] No       [ ] N/A      REVIEW OF SYSTEMS:  [x ]chest pain  [  ]shortness of breath  [  ]palpitations  [  ]syncope  [ ]near syncope [ ]upper extremity weakness   [ ] lower extremity weakness  [  ]diplopia  [  ]altered mental status   [  ]fevers  [ ]chills [ ]nausea  [ ]vomiting  [  ]dysphagia    [ ]abdominal pain  [ ]melena  [ ]BRBPR    [  ]epistaxis  [  ]rash    [ ]lower extremity edema        [X] All others negative	  [ ] Unable to obtain      LABS:	 	    CARDIAC MARKERS:  Troponin T, High Sensitivity Result: 22, 18                          13.0   9.44  )-----------( 315      ( 14 Jan 2025 22:20 )             37.0    132[L]  |  96[L]  |  18  ----------------------------<  99  3.8   |  23  |  0.76    Ca    9.8      14 Jan 2025 22:20  Mg     1.70     01-15    TPro  7.6  /  Alb  4.1  /  TBili  0.5  /  DBili  x   /  AST  26  /  ALT  16  /  AlkPhos  111  01-14    Creatinine Trend: 0.76<--    Coags:  PT/INR - ( 14 Jan 2025 22:20 )   PT: 11.3 sec;   INR: 0.95 ratio      PTT - ( 14 Jan 2025 22:20 )  PTT:31.3 sec      PHYSICAL EXAM:  T(C): 36.8 (01-15-25 @ 06:45), Max: 36.9 (01-14-25 @ 19:37)  HR: 63 (01-15-25 @ 06:45) (57 - 71)  BP: 170/67 (01-15-25 @ 06:45) (123/57 - 170/67)  RR: 16 (01-15-25 @ 06:45) (16 - 19)  SpO2: 98% (01-15-25 @ 06:45) (95% - 99%)      Gen: NAD  HEENT:  (-)icterus (-)pallor  CV: N S1 S2 1/6 GAMALIEL (+)2 Pulses B/l  Resp:  Clear to auscultation B/L, normal effort  GI: (+) BS Soft, NT, ND  Lymph:  (-)Edema, (-)obvious lymphadenopathy  Skin: Warm to touch, Normal turgor  Psych: Appropriate mood and affect      TELEMETRY: 	  SR    ECG:  	pending    [facility] 6/2019:  DIAGNOSTIC RECOMMENDATIONS: Coronary angiogram demonstrates moderate  atherosclerosis in the RCA that was not physiologically significant by IFR  (IFR 0.98). Medical management and risk factor modification is  recommended.      ASSESSMENT/PLAN: 74 year old Female with history of HTN, historically preserved LV function, CAD hx remote RCA stent, s/p LCx stent in July of 2018, last [facility] 1/2019 with patent stents, sent from her cardiologists office Dr Sally Pelayo, in setting of intermittent nonexertional left sided chest and substernal pain for last week associated with bouts of palpitations and dizziness    --ACS ruled out  --hold Xarelto  --plan for [facility] 1/16 to eval for obs CAD  --cont ASA 81 and statin for hx CAD  --cont Norvasc, HCTZ and Valsartan for HTN    further reccs pending hospital course  Thank you for allowing us to participate in the care of our mutual patient.  Please do not hesitate to call with any questions.     Meg MURILLO  [phone]

## 2025-01-15 NOTE — PATIENT PROFILE ADULT - FALL HARM RISK - HARM RISK INTERVENTIONS

## 2025-01-15 NOTE — ED ADULT NURSE NOTE - OBJECTIVE STATEMENT
Pt. received to spot 1A. Pt. A&OX4 and ambulatory sent by MD to ED for chest discomfort. Pt. medical hx HTN, HLD, CAD, s/p 6 stent placements. Pt. states that she has had intermittent chest pain for the past 3 days with SOB with ambulation, and back pain. Pt. states having a cardiac catheterization tomorrow and MD was told to present to the ED for observation until procedure. Pt. endorses 30 years of cigarette use (one pack a day). Pt. denies HA, dizziness, blurry vision, chest pain, SOB at rest, diarrhea, and constipation. Upon assessment, respirations even and unlabored, chest rise equal bilaterally. Pt. placed on cardiac monitor, NSR noted. Right 20G AC IV placed, labs collected and sent. Comfort measures provided, safety maintained throughout.

## 2025-01-15 NOTE — PROGRESS NOTE ADULT - PROBLEM SELECTOR PLAN 1
-c/w aspirin, statin, arb  -tele, tte, orthostatics , fall precautions     -given right arm/shoulder pain for last 2 weeks, will order plain film and arm duplex; denies tingling, numbness, weakness

## 2025-01-15 NOTE — ED ADULT NURSE NOTE - NSFALLUNIVINTERV_ED_ALL_ED
Bed/Stretcher in lowest position, wheels locked, appropriate side rails in place/Call bell, personal items and telephone in reach/Instruct patient to call for assistance before getting out of bed/chair/stretcher/Non-slip footwear applied when patient is off stretcher/[place] to call system/Physically safe environment - no spills, clutter or unnecessary equipment/Purposeful proactive rounding/Room/bathroom lighting operational, light cord in reach

## 2025-01-15 NOTE — CONSULT NOTE ADULT - NS ATTEND AMEND GEN_ALL_CORE FT
Patient seen and examined. Agree with plan as detailed in PA/NP Note.     For Cath tomorrow  EFRAIN Rodas MD  Pager: [phone]

## 2025-01-16 ENCOUNTER — TRANSCRIPTION ENCOUNTER (OUTPATIENT)
Age: 75
End: 2025-01-16

## 2025-01-16 VITALS
RESPIRATION RATE: 17 BRPM | OXYGEN SATURATION: 98 % | TEMPERATURE: 98 F | SYSTOLIC BLOOD PRESSURE: 150 MMHG | DIASTOLIC BLOOD PRESSURE: 60 MMHG | HEART RATE: 60 BPM

## 2025-01-16 LAB
ANION GAP SERPL CALC-SCNC: 14 MMOL/L — SIGNIFICANT CHANGE UP (ref 7–14)
BASOPHILS # BLD AUTO: 0.05 K/UL — SIGNIFICANT CHANGE UP (ref 0–0.2)
BASOPHILS NFR BLD AUTO: 0.7 % — SIGNIFICANT CHANGE UP (ref 0–2)
BUN SERPL-MCNC: 16 MG/DL — SIGNIFICANT CHANGE UP (ref 7–23)
CALCIUM SERPL-MCNC: 9.1 MG/DL — SIGNIFICANT CHANGE UP (ref 8.4–10.5)
CHLORIDE SERPL-SCNC: 99 MMOL/L — SIGNIFICANT CHANGE UP (ref 98–107)
CO2 SERPL-SCNC: 23 MMOL/L — SIGNIFICANT CHANGE UP (ref 22–31)
CREAT SERPL-MCNC: 0.63 MG/DL — SIGNIFICANT CHANGE UP (ref 0.5–1.3)
EGFR: 94 ML/MIN/1.73M2 — SIGNIFICANT CHANGE UP
EOSINOPHIL # BLD AUTO: 0.28 K/UL — SIGNIFICANT CHANGE UP (ref 0–0.5)
EOSINOPHIL NFR BLD AUTO: 3.9 % — SIGNIFICANT CHANGE UP (ref 0–6)
GLUCOSE SERPL-MCNC: 105 MG/DL — HIGH (ref 70–99)
HCT VFR BLD CALC: 37.4 % — SIGNIFICANT CHANGE UP (ref 34.5–45)
HGB BLD-MCNC: 12.9 G/DL — SIGNIFICANT CHANGE UP (ref 11.5–15.5)
IANC: 3.83 K/UL — SIGNIFICANT CHANGE UP (ref 1.8–7.4)
IMM GRANULOCYTES NFR BLD AUTO: 0.4 % — SIGNIFICANT CHANGE UP (ref 0–0.9)
LYMPHOCYTES # BLD AUTO: 2.02 K/UL — SIGNIFICANT CHANGE UP (ref 1–3.3)
LYMPHOCYTES # BLD AUTO: 28.3 % — SIGNIFICANT CHANGE UP (ref 13–44)
MAGNESIUM SERPL-MCNC: 1.7 MG/DL — SIGNIFICANT CHANGE UP (ref 1.6–2.6)
MCHC RBC-ENTMCNC: 33.7 PG — SIGNIFICANT CHANGE UP (ref 27–34)
MCHC RBC-ENTMCNC: 34.5 G/DL — SIGNIFICANT CHANGE UP (ref 32–36)
MCV RBC AUTO: 97.7 FL — SIGNIFICANT CHANGE UP (ref 80–100)
MONOCYTES # BLD AUTO: 0.92 K/UL — HIGH (ref 0–0.9)
MONOCYTES NFR BLD AUTO: 12.9 % — SIGNIFICANT CHANGE UP (ref 2–14)
NEUTROPHILS # BLD AUTO: 3.83 K/UL — SIGNIFICANT CHANGE UP (ref 1.8–7.4)
NEUTROPHILS NFR BLD AUTO: 53.8 % — SIGNIFICANT CHANGE UP (ref 43–77)
NRBC # BLD: 0 /100 WBCS — SIGNIFICANT CHANGE UP (ref 0–0)
NRBC # FLD: 0 K/UL — SIGNIFICANT CHANGE UP (ref 0–0)
PHOSPHATE SERPL-MCNC: 3.1 MG/DL — SIGNIFICANT CHANGE UP (ref 2.5–4.5)
PLATELET # BLD AUTO: 304 K/UL — SIGNIFICANT CHANGE UP (ref 150–400)
POTASSIUM SERPL-MCNC: 3.7 MMOL/L — SIGNIFICANT CHANGE UP (ref 3.5–5.3)
POTASSIUM SERPL-SCNC: 3.7 MMOL/L — SIGNIFICANT CHANGE UP (ref 3.5–5.3)
RBC # BLD: 3.83 M/UL — SIGNIFICANT CHANGE UP (ref 3.8–5.2)
RBC # FLD: 12.6 % — SIGNIFICANT CHANGE UP (ref 10.3–14.5)
SODIUM SERPL-SCNC: 136 MMOL/L — SIGNIFICANT CHANGE UP (ref 135–145)
WBC # BLD: 7.13 K/UL — SIGNIFICANT CHANGE UP (ref 3.8–10.5)
WBC # FLD AUTO: 7.13 K/UL — SIGNIFICANT CHANGE UP (ref 3.8–10.5)

## 2025-01-16 RX ORDER — OMEPRAZOLE MAGNESIUM 20 MG/1
20 CAPSULE, DELAYED RELEASE ORAL
Qty: 0 | Refills: 0 | DISCHARGE

## 2025-01-16 RX ADMIN — VALSARTAN 320 MILLIGRAM(S): 80 TABLET ORAL at 06:10

## 2025-01-16 RX ADMIN — PANTOPRAZOLE 40 MILLIGRAM(S): 40 TABLET, DELAYED RELEASE ORAL at 06:12

## 2025-01-16 RX ADMIN — Medication 5 MILLIGRAM(S): at 06:12

## 2025-01-16 NOTE — DISCHARGE NOTE NURSING/CASE MANAGEMENT/SOCIAL WORK - FINANCIAL ASSISTANCE
[facility] provides services at a reduced cost to those who are determined to be eligible through [facility]’s financial assistance program. Information regarding [facility]’s financial assistance program can be found by going to [email or web address] or by calling [phone].

## 2025-01-16 NOTE — CHART NOTE - NSCHARTNOTEFT_GEN_A_CORE
s/p CATH with patent stents LCx, RCA; RFA access; advised by Dr Rodas to resume Xarelto for evening dose tonight, 1/16 if groin site check remains stable with no bleed and no hematoma

## 2025-01-16 NOTE — DISCHARGE NOTE NURSING/CASE MANAGEMENT/SOCIAL WORK - PATIENT PORTAL LINK FT
You can access the FollowMyHealth Patient Portal offered by [facility] by registering at the following website: [email or web address]. By joining [employer]’s FollowMyHealth portal, you will also be able to view your health information using other applications (apps) compatible with our system.

## 2025-01-16 NOTE — DISCHARGE NOTE NURSING/CASE MANAGEMENT/SOCIAL WORK - NSDCPEFALRISK_GEN_ALL_CORE
For information on Fall & Injury Prevention, visit: [email or web address] OR  [email or web address] OR  https://www.cdc.gov/steadi/patient.html

## 2025-01-16 NOTE — DISCHARGE NOTE PROVIDER - CARE PROVIDER_API CALL
Sally Pelayo  Cardiovascular Disease  [address], [address]  [place] [address]  Phone: [phone]  Fax: [phone]  Follow Up Time: 1 week

## 2025-01-16 NOTE — DISCHARGE NOTE NURSING/CASE MANAGEMENT/SOCIAL WORK - NSDCPEWEB_GEN_ALL_CORE
[facility] for Tobacco Control website --- [email or web address] website --- [email or web address]

## 2025-01-16 NOTE — PRE PROCEDURE NOTE - PRE PROCEDURE EVALUATION
The patient presents today for [facility]  See H&P from presurgical testing.   The patient denies any new complaints since the last time she was seen by Dr. Rivera.  Medications reviewed.    NPO Date and Time: 1/15 at 2200  Mallampati:  2  Anticoagulation Date and Time? Xarelto on 1/15 at 0600  Previous Endoscopy?  NO  Hx of CVA?  NO  Sleep Apnea?  NO  Dentures? NO  Loose Teeth? NO   The patient presents today for [facility]  See H&P from presurgical testing.   The patient denies any new complaints since the last time she was seen by Dr. Rivera.  Medications reviewed.    NPO Date and Time: 1/15 at 2200  Mallampati:  2  Anticoagulation Date and Time? Xarelto on 1/15 at 1800  Previous Endoscopy?  NO  Hx of CVA?  NO  Sleep Apnea?  NO  Dentures? uppers and lowers  Loose Teeth? NO

## 2025-01-16 NOTE — DISCHARGE NOTE PROVIDER - NSDCCPCAREPLAN_GEN_ALL_CORE_FT
PRINCIPAL DISCHARGE DIAGNOSIS  Diagnosis: Unstable angina  Assessment and Plan of Treatment: You had a coronary angiogram which showed no blockages.  Please follow up with Dr Pelayo in 1 week      SECONDARY DISCHARGE DIAGNOSES  Diagnosis: HTN (hypertension)  Assessment and Plan of Treatment: resume your medication as previous     PRINCIPAL DISCHARGE DIAGNOSIS  Diagnosis: Unstable angina  Assessment and Plan of Treatment: You had a coronary angiogram which showed no blockages.  Please follow up with Dr Pelayo in 1 week  Please resume Xarelto later today after 8 PM      SECONDARY DISCHARGE DIAGNOSES  Diagnosis: HTN (hypertension)  Assessment and Plan of Treatment: resume your medication as previous  Low sat diet

## 2025-01-16 NOTE — DISCHARGE NOTE PROVIDER - HOSPITAL COURSE
74 year old Female with history of HTN, CAD hx remote RCA stent, s/p LCx stent in July of 2018, last C 1/2019 with patent stents, Pt sent from her cardiologists office  Dr Sally Pelayo in setting of intermittent nonexertional left sided chest and substernal pain  for last week associated with bouts of palpitations and dizziness.  s/p cath, no intervetion, stents patent

## 2025-01-16 NOTE — DISCHARGE NOTE PROVIDER - NSDCMRMEDTOKEN_GEN_ALL_CORE_FT
aspirin 81 mg oral delayed release capsule: orally  CeleXA 40 mg oral tablet: 1 tab(s) orally  Diovan  mg-25 mg oral tablet: 1 tab(s) orally  Lipitor 40 mg oral tablet: 1 tab(s) orally  Norvasc 5 mg oral tablet: 1 tab(s) orally  Omeprazole: 20 milligram(s) orally once a day  Xarelto 2.5 mg oral tablet: 1 tab(s) orally

## 2025-01-16 NOTE — PROGRESS NOTE ADULT - SUBJECTIVE AND OBJECTIVE BOX
DATE OF SERVICE: 01-16-25    Patient denies chest pain or shortness of breath.   Review of symptoms otherwise negative.    MEDICATIONS:  amLODIPine   Tablet 5 milliGRAM(s) Oral daily  aspirin enteric coated 81 milliGRAM(s) Oral daily  atorvastatin 40 milliGRAM(s) Oral at bedtime  citalopram 40 milliGRAM(s) Oral daily  hydrochlorothiazide 25 milliGRAM(s) Oral daily  pantoprazole    Tablet 40 milliGRAM(s) Oral before breakfast  valsartan 320 milliGRAM(s) Oral daily      LABS:                        12.9   7.13  )-----------( 304      ( 16 Jan 2025 06:08 )             37.4       Hemoglobin: 12.9 g/dL (01-16 @ 06:08)  Hemoglobin: 11.7 g/dL (01-15 @ 10:02)  Hemoglobin: 13.0 g/dL (01-14 @ 22:20)      01-16    136  |  99  |  16  ----------------------------<  105[H]  3.7   |  23  |  0.63    Ca    9.1      16 Jan 2025 06:08  Phos  3.1     01-16  Mg     1.70     01-16    TPro  7.6  /  Alb  4.1  /  TBili  0.5  /  DBili  x   /  AST  26  /  ALT  16  /  AlkPhos  111  01-14    Creatinine Trend: 0.63<--, 0.57<--, 0.76<--    PHYSICAL EXAM:  T(C): 36.8 (01-16-25 @ 07:00), Max: 36.9 (01-15-25 @ 21:28)  HR: 53 (01-16-25 @ 07:00) (53 - 60)  BP: 154/68 (01-16-25 @ 07:00) (123/57 - 159/62)  RR: 17 (01-16-25 @ 07:00) (17 - 18)  SpO2: 96% (01-16-25 @ 07:00) (96% - 97%)  Wt(kg): --    I&O's Summary      General:  Alert and Oriented * 3.   Head: Normocephalic and atraumatic.   Neck: No JVD. No bruits. Supple. Does not appear to be enlarged.   Cardiovascular: + S1,S2 ; RRR Soft systolic murmur at the left lower sternal border. No rubs noted.    Lungs: CTA b/l. No rhonchi, rales or wheezes.   Abdomen: + BS, soft. Non tender. Non distended. No rebound. No guarding.   Extremities: No clubbing/cyanosis/edema.   Neurologic: Moves all four extremities. Full range of motion.   Skin: Warm and moist. The patient's skin has normal elasticity and good skin turgor.   Psychiatric: Appropriate mood and affect.  Musculoskeletal: Normal range of motion, normal strength     TELEMETRY: 	  SR        ASSESSMENT/PLAN: 74 year old Female with history of HTN, historically preserved LV function, CAD hx remote RCA stent, s/p LCx stent in July of 2018, last LHC 1/2019 with patent stents, sent from her cardiologists office Dr Sally Pelayo, in setting of intermittent nonexertional left sided chest and substernal pain for last week associated with bouts of palpitations and dizziness    --ACS ruled out  --hold Xarelto  --plan for C today to eval for obs CAD. All risks, benefits, indications, contraindications, inferior alternative options of cardiac cath explained to the patient. The patient understood everything and elects for cardiac cath.   --cont ASA 81 and statin for hx CAD  --cont Norvasc, HCTZ and Valsartan for HTN      Suzette Rodas MD  Pager: [phone] 
[facility] Division of Hospital Medicine  Geno Mayer MD  Pager [phone]    Patient is a 74y old  Female who presents with a chief complaint of concern for unstable angina       SUBJECTIVE / OVERNIGHT EVENTS: pt currently denies CP/arm pain; plan for cath tomorrow      MEDICATIONS  (STANDING):  amLODIPine   Tablet 5 milliGRAM(s) Oral daily  aspirin enteric coated 81 milliGRAM(s) Oral daily  atorvastatin 40 milliGRAM(s) Oral at bedtime  citalopram 40 milliGRAM(s) Oral daily  hydrochlorothiazide 25 milliGRAM(s) Oral daily  pantoprazole    Tablet 40 milliGRAM(s) Oral before breakfast  valsartan 320 milliGRAM(s) Oral daily        PHYSICAL EXAM:  Vital Signs Last 24 Hrs  T(F): 98.2 (15 Jonathan 2025 06:45), Max: 98.4 (14 Jan 2025 19:37)  HR: 63 (15 Jonathan 2025 06:45) (57 - 71)  BP: 170/67 (15 Jonathan 2025 06:45) (123/57 - 170/67)  RR: 16 (15 Jonathan 2025 06:45) (16 - 19)  SpO2: 98% (15 Jonathan 2025 06:45) (95% - 99%)    Parameters below as of 15 Jonathan 2025 06:45  Patient On (Oxygen Delivery Method): room air        CONSTITUTIONAL: NAD, appears comfortable  EYES: PERRLA; conjunctiva and sclera clear  ENMT: Moist oral mucosa; normal dentition  RESPIRATORY: Normal respiratory effort; grossly b/l AE  CARDIOVASCULAR: Regular rate and rhythm; No lower extremity edema  ABDOMEN: Nontender to palpation, normoactive bowel sounds  MUSCULOSKELETAL:  no clubbing or cyanosis of digits; no joint swelling or tenderness to palpation  PSYCH: A+O to person, place, and time; affect appropriate  NEUROLOGY: CN 2-12 are intact and symmetric; no gross sensory deficits   SKIN: No rashes; no palpable lesions    LABS:                        11.7   5.87  )-----------( 273      ( 15 Jonathan 2025 10:02 )             33.6     01-15    133[L]  |  98  |  14  ----------------------------<  121[H]  3.9   |  23  |  0.57    Ca    8.9      15 Jonathan 2025 10:02  Phos  3.4     01-15  Mg     1.60     01-15

## 2025-01-16 NOTE — DISCHARGE NOTE PROVIDER - ATTENDING DISCHARGE PHYSICAL EXAMINATION:
pt seen and examined by me  seen earlier this AM, s/p cath  VSS  CHEST b/l AE   EXT no cce  a/w suspected unstable angina  unremarkable cath  f/u outpt with cards

## 2025-02-20 ENCOUNTER — APPOINTMENT (OUTPATIENT)
Dept: RADIOLOGY | Facility: IMAGING CENTER | Age: 75
End: 2025-02-20
Payer: COMMERCIAL

## 2025-02-20 ENCOUNTER — OUTPATIENT (OUTPATIENT)
Dept: OUTPATIENT SERVICES | Facility: HOSPITAL | Age: 75
LOS: 1 days | End: 2025-02-20
Payer: COMMERCIAL

## 2025-02-20 DIAGNOSIS — Z98.890 OTHER SPECIFIED POSTPROCEDURAL STATES: Chronic | ICD-10-CM

## 2025-02-20 DIAGNOSIS — Z95.5 PRESENCE OF CORONARY ANGIOPLASTY IMPLANT AND GRAFT: Chronic | ICD-10-CM

## 2025-02-20 DIAGNOSIS — Z89.429 ACQUIRED ABSENCE OF OTHER TOE(S), UNSPECIFIED SIDE: Chronic | ICD-10-CM

## 2025-02-20 DIAGNOSIS — G56.01 CARPAL TUNNEL SYNDROME, RIGHT UPPER LIMB: Chronic | ICD-10-CM

## 2025-02-20 DIAGNOSIS — Z92.3 PERSONAL HISTORY OF IRRADIATION: Chronic | ICD-10-CM

## 2025-02-20 DIAGNOSIS — Z00.8 ENCOUNTER FOR OTHER GENERAL EXAMINATION: ICD-10-CM

## 2025-02-20 DIAGNOSIS — S98.139A COMPLETE TRAUMATIC AMPUTATION OF ONE UNSPECIFIED LESSER TOE, INITIAL ENCOUNTER: Chronic | ICD-10-CM

## 2025-02-20 PROCEDURE — 73502 X-RAY EXAM HIP UNI 2-3 VIEWS: CPT

## 2025-02-20 PROCEDURE — 73502 X-RAY EXAM HIP UNI 2-3 VIEWS: CPT | Mod: 26,RT

## 2025-03-04 NOTE — ASU PREOP CHECKLIST - BP NONINVASIVE DIASTOLIC (MM HG)
fexofenadine-pseudoephedrine (ALLEGRA-D 12HR)  MG per extended release tablet Take 1 tablet by mouth 2 times daily 20 tablet 0    acetaminophen (TYLENOL) 500 MG tablet Take 2 tablets by mouth 4 times daily as needed for Pain 30 tablet 0    citalopram (CELEXA) 10 MG tablet Take 1 tablet by mouth daily      metoprolol tartrate (LOPRESSOR) 25 MG tablet Take 1 tablet by mouth daily      metFORMIN (GLUCOPHAGE) 500 MG tablet Take 1 tablet by mouth daily (Patient not taking: Reported on 2/4/2025)      pantoprazole (PROTONIX) 40 MG tablet Take 1 tablet by mouth daily      zolpidem (AMBIEN) 10 MG tablet Take 1 tablet by mouth.       No current facility-administered medications for this visit.      Patient Active Problem List   Diagnosis    Gastroesophageal reflux disease without esophagitis    Lumbosacral spondylosis without myelopathy    Pain in joint, multiple sites    Lumbago    Cervicalgia    Encounter for long-term (current) use of other medications    Paresthesia    Other syndromes affecting cervical region    Osteoarthritis    Intractable migraine    Headache(784.0)    Derangement of posterior horn of lateral meniscus of right knee due to old injury      Family History   Problem Relation Age of Onset    Hypertension Mother     Diabetes Mother     Headache Mother     Prostate Cancer Father     Lung Cancer Father     Headache Father     Asthma Father     Diabetes Father     Hypertension Father     Breast Cancer Maternal Aunt 60        60s and passed away age 68    Cancer Maternal Aunt     Breast Cancer Paternal Aunt       Social History     Socioeconomic History    Marital status: Single     Spouse name: None    Number of children: None    Years of education: None    Highest education level: None   Tobacco Use    Smoking status: Never    Smokeless tobacco: Never   Vaping Use    Vaping status: Never Used   Substance and Sexual Activity    Alcohol use: Not Currently    Drug use: Never   Social History Narrative    
83

## 2025-04-10 PROBLEM — I25.10 ATHEROSCLEROTIC HEART DISEASE OF NATIVE CORONARY ARTERY WITHOUT ANGINA PECTORIS: Chronic | Status: ACTIVE | Noted: 2025-01-15

## 2025-04-10 PROBLEM — I10 ESSENTIAL (PRIMARY) HYPERTENSION: Chronic | Status: ACTIVE | Noted: 2025-01-15

## 2025-04-10 PROBLEM — E78.5 HYPERLIPIDEMIA, UNSPECIFIED: Chronic | Status: ACTIVE | Noted: 2025-01-15

## 2025-06-18 ENCOUNTER — NON-APPOINTMENT (OUTPATIENT)
Age: 75
End: 2025-06-18

## 2025-06-18 VITALS — BODY MASS INDEX: 27.78 KG/M2 | HEIGHT: 67 IN | WEIGHT: 177 LBS

## 2025-06-23 NOTE — H&P PST ADULT - DOES THIS PATIENT HAVE A HISTORY OF OR HAS BEEN DX WITH HEART FAILURE?
Chronic, at goal (stable), continue current treatment plan, medication adherence emphasized, and lifestyle modifications recommended  
no

## 2025-08-14 ENCOUNTER — APPOINTMENT (OUTPATIENT)
Dept: CT IMAGING | Facility: IMAGING CENTER | Age: 75
End: 2025-08-14

## 2025-08-14 ENCOUNTER — APPOINTMENT (OUTPATIENT)
Dept: MAMMOGRAPHY | Facility: IMAGING CENTER | Age: 75
End: 2025-08-14

## 2025-08-14 ENCOUNTER — APPOINTMENT (OUTPATIENT)
Dept: ULTRASOUND IMAGING | Facility: IMAGING CENTER | Age: 75
End: 2025-08-14

## 2025-08-21 ENCOUNTER — EMERGENCY (EMERGENCY)
Facility: HOSPITAL | Age: 75
LOS: 1 days | End: 2025-08-21
Attending: EMERGENCY MEDICINE | Admitting: EMERGENCY MEDICINE
Payer: COMMERCIAL

## 2025-08-21 VITALS
OXYGEN SATURATION: 97 % | TEMPERATURE: 98 F | DIASTOLIC BLOOD PRESSURE: 63 MMHG | RESPIRATION RATE: 20 BRPM | WEIGHT: 164.91 LBS | SYSTOLIC BLOOD PRESSURE: 156 MMHG | HEART RATE: 67 BPM | HEIGHT: 67 IN

## 2025-08-21 VITALS
RESPIRATION RATE: 18 BRPM | SYSTOLIC BLOOD PRESSURE: 187 MMHG | DIASTOLIC BLOOD PRESSURE: 70 MMHG | OXYGEN SATURATION: 99 % | HEART RATE: 48 BPM

## 2025-08-21 DIAGNOSIS — Z95.5 PRESENCE OF CORONARY ANGIOPLASTY IMPLANT AND GRAFT: Chronic | ICD-10-CM

## 2025-08-21 DIAGNOSIS — G56.01 CARPAL TUNNEL SYNDROME, RIGHT UPPER LIMB: Chronic | ICD-10-CM

## 2025-08-21 DIAGNOSIS — Z92.3 PERSONAL HISTORY OF IRRADIATION: Chronic | ICD-10-CM

## 2025-08-21 DIAGNOSIS — Z89.429 ACQUIRED ABSENCE OF OTHER TOE(S), UNSPECIFIED SIDE: Chronic | ICD-10-CM

## 2025-08-21 DIAGNOSIS — Z98.890 OTHER SPECIFIED POSTPROCEDURAL STATES: Chronic | ICD-10-CM

## 2025-08-21 DIAGNOSIS — S98.139A COMPLETE TRAUMATIC AMPUTATION OF ONE UNSPECIFIED LESSER TOE, INITIAL ENCOUNTER: Chronic | ICD-10-CM

## 2025-08-21 LAB
ADD ON TEST-SPECIMEN IN LAB: SIGNIFICANT CHANGE UP
ALBUMIN SERPL ELPH-MCNC: 3.9 G/DL — SIGNIFICANT CHANGE UP (ref 3.3–5)
ALP SERPL-CCNC: 120 U/L — SIGNIFICANT CHANGE UP (ref 40–120)
ALT FLD-CCNC: 13 U/L — SIGNIFICANT CHANGE UP (ref 4–33)
ANION GAP SERPL CALC-SCNC: 14 MMOL/L — SIGNIFICANT CHANGE UP (ref 7–14)
AST SERPL-CCNC: 21 U/L — SIGNIFICANT CHANGE UP (ref 4–32)
BASOPHILS # BLD AUTO: 0.04 K/UL — SIGNIFICANT CHANGE UP (ref 0–0.2)
BASOPHILS NFR BLD AUTO: 0.4 % — SIGNIFICANT CHANGE UP (ref 0–2)
BILIRUB SERPL-MCNC: 0.8 MG/DL — SIGNIFICANT CHANGE UP (ref 0.2–1.2)
BUN SERPL-MCNC: 11 MG/DL — SIGNIFICANT CHANGE UP (ref 7–23)
CALCIUM SERPL-MCNC: 8.9 MG/DL — SIGNIFICANT CHANGE UP (ref 8.4–10.5)
CHLORIDE SERPL-SCNC: 93 MMOL/L — LOW (ref 98–107)
CO2 SERPL-SCNC: 24 MMOL/L — SIGNIFICANT CHANGE UP (ref 22–31)
CREAT SERPL-MCNC: 0.59 MG/DL — SIGNIFICANT CHANGE UP (ref 0.5–1.3)
EGFR: 95 ML/MIN/1.73M2 — SIGNIFICANT CHANGE UP
EGFR: 95 ML/MIN/1.73M2 — SIGNIFICANT CHANGE UP
EOSINOPHIL # BLD AUTO: 0.05 K/UL — SIGNIFICANT CHANGE UP (ref 0–0.5)
EOSINOPHIL NFR BLD AUTO: 0.5 % — SIGNIFICANT CHANGE UP (ref 0–6)
GLUCOSE SERPL-MCNC: 90 MG/DL — SIGNIFICANT CHANGE UP (ref 70–99)
HCT VFR BLD CALC: 37.6 % — SIGNIFICANT CHANGE UP (ref 34.5–45)
HGB BLD-MCNC: 12.8 G/DL — SIGNIFICANT CHANGE UP (ref 11.5–15.5)
IMM GRANULOCYTES # BLD AUTO: 0.03 K/UL — SIGNIFICANT CHANGE UP (ref 0–0.07)
IMM GRANULOCYTES NFR BLD AUTO: 0.3 % — SIGNIFICANT CHANGE UP (ref 0–0.9)
LYMPHOCYTES # BLD AUTO: 1.67 K/UL — SIGNIFICANT CHANGE UP (ref 1–3.3)
LYMPHOCYTES NFR BLD AUTO: 18.2 % — SIGNIFICANT CHANGE UP (ref 13–44)
MAGNESIUM SERPL-MCNC: 1.5 MG/DL — LOW (ref 1.6–2.6)
MCHC RBC-ENTMCNC: 32.7 PG — SIGNIFICANT CHANGE UP (ref 27–34)
MCHC RBC-ENTMCNC: 34 G/DL — SIGNIFICANT CHANGE UP (ref 32–36)
MCV RBC AUTO: 96.2 FL — SIGNIFICANT CHANGE UP (ref 80–100)
MONOCYTES # BLD AUTO: 0.8 K/UL — SIGNIFICANT CHANGE UP (ref 0–0.9)
MONOCYTES NFR BLD AUTO: 8.7 % — SIGNIFICANT CHANGE UP (ref 2–14)
NEUTROPHILS # BLD AUTO: 6.61 K/UL — SIGNIFICANT CHANGE UP (ref 1.8–7.4)
NEUTROPHILS NFR BLD AUTO: 71.9 % — SIGNIFICANT CHANGE UP (ref 43–77)
NRBC # BLD AUTO: 0 K/UL — SIGNIFICANT CHANGE UP (ref 0–0)
NRBC # FLD: 0 K/UL — SIGNIFICANT CHANGE UP (ref 0–0)
NRBC BLD AUTO-RTO: 0 /100 WBCS — SIGNIFICANT CHANGE UP (ref 0–0)
NT-PROBNP SERPL-SCNC: 988 PG/ML — HIGH
PHOSPHATE SERPL-MCNC: 3.3 MG/DL — SIGNIFICANT CHANGE UP (ref 2.5–4.5)
PLATELET # BLD AUTO: 289 K/UL — SIGNIFICANT CHANGE UP (ref 150–400)
PMV BLD: 11.5 FL — SIGNIFICANT CHANGE UP (ref 7–13)
POTASSIUM SERPL-MCNC: 3.7 MMOL/L — SIGNIFICANT CHANGE UP (ref 3.5–5.3)
POTASSIUM SERPL-SCNC: 3.7 MMOL/L — SIGNIFICANT CHANGE UP (ref 3.5–5.3)
PROT SERPL-MCNC: 7.3 G/DL — SIGNIFICANT CHANGE UP (ref 6–8.3)
RBC # BLD: 3.91 M/UL — SIGNIFICANT CHANGE UP (ref 3.8–5.2)
RBC # FLD: 13.7 % — SIGNIFICANT CHANGE UP (ref 10.3–14.5)
SODIUM SERPL-SCNC: 131 MMOL/L — LOW (ref 135–145)
TROPONIN T, HIGH SENSITIVITY RESULT: 16 NG/L — HIGH
TROPONIN T, HIGH SENSITIVITY RESULT: 20 NG/L — HIGH
WBC # BLD: 9.2 K/UL — SIGNIFICANT CHANGE UP (ref 3.8–10.5)
WBC # FLD AUTO: 9.2 K/UL — SIGNIFICANT CHANGE UP (ref 3.8–10.5)

## 2025-08-21 PROCEDURE — 71046 X-RAY EXAM CHEST 2 VIEWS: CPT | Mod: 26

## 2025-08-21 PROCEDURE — 73590 X-RAY EXAM OF LOWER LEG: CPT | Mod: 26,LT

## 2025-08-21 PROCEDURE — 99285 EMERGENCY DEPT VISIT HI MDM: CPT

## 2025-08-21 PROCEDURE — 93010 ELECTROCARDIOGRAM REPORT: CPT

## 2025-08-21 PROCEDURE — 93970 EXTREMITY STUDY: CPT | Mod: 26

## 2025-08-21 RX ORDER — MAGNESIUM SULFATE 500 MG/ML
2 SYRINGE (ML) INJECTION ONCE
Refills: 0 | Status: COMPLETED | OUTPATIENT
Start: 2025-08-21 | End: 2025-08-21

## 2025-08-21 RX ORDER — ACETAMINOPHEN 500 MG/5ML
975 LIQUID (ML) ORAL ONCE
Refills: 0 | Status: COMPLETED | OUTPATIENT
Start: 2025-08-21 | End: 2025-08-21

## 2025-08-21 RX ADMIN — Medication 50 GRAM(S): at 15:07

## (undated) DEVICE — SUT MONOCRYL 4-0 27" PS-2 UNDYED

## (undated) DEVICE — VENODYNE/SCD SLEEVE CALF MEDIUM

## (undated) DEVICE — VENODYNE/SCD SLEEVE CALF LARGE

## (undated) DEVICE — NDL HYPO REGULAR BEVEL 25G X 1.5" (BLUE)

## (undated) DEVICE — DRSG STERISTRIPS 0.25 X 3"

## (undated) DEVICE — DRSG MASTISOL

## (undated) DEVICE — GLV 6.5 PROTEXIS (WHITE)

## (undated) DEVICE — POSITIONER FOAM EGG CRATE ULNAR 2PCS (PINK)

## (undated) DEVICE — BLADE SURGICAL #15 CARBON

## (undated) DEVICE — DRSG COBAN 4"

## (undated) DEVICE — DRILL BIT STRYKER 2.7MM

## (undated) DEVICE — SYR LUER LOK 10CC

## (undated) DEVICE — ELCTR GROUNDING PAD ADULT COVIDIEN

## (undated) DEVICE — VENODYNE/SCD SLEEVE CALF BARIATRIC

## (undated) DEVICE — GLV 6 PROTEXIS (WHITE)

## (undated) DEVICE — ELCTR BOVIE TIP BLADE INSULATED 4" EDGE

## (undated) DEVICE — DRILL SPEED GUIDE 2.0

## (undated) DEVICE — DRSG ACE BANDAGE 4" NS

## (undated) DEVICE — STRYKER DRILL SPEEDGUIDE 3.5MM

## (undated) DEVICE — TOURNIQUET ESMARK 4"

## (undated) DEVICE — SAW BLADE MICROAIRE SAGITTAL 9.4MMX25.4MMX0.6MM

## (undated) DEVICE — POSITIONER PATIENT SAFETY STRAP 3X60"

## (undated) DEVICE — PREP CHLORAPREP HI-LITE ORANGE 26ML

## (undated) DEVICE — Device

## (undated) DEVICE — WARMING BLANKET UPPER ADULT

## (undated) DEVICE — ELCTR ROCKER SWITCH PENCIL BLUE 10FT

## (undated) DEVICE — DRSG SPLINT ROLL ORTHO GLASS SYSTEM 4"X15'

## (undated) DEVICE — SOL IRR POUR NS 0.9% 500ML

## (undated) DEVICE — SUT VICRYL 3-0 18" PS-2 UNDYED

## (undated) DEVICE — DRAPE C ARM MINI

## (undated) DEVICE — SOL IRR POUR H2O 500ML

## (undated) DEVICE — WRIGHT MEDICAL PREPARATION KIT

## (undated) DEVICE — DRILL BIT STRYKER 2X135MM

## (undated) DEVICE — CAM-ESU 1501367: Type: DURABLE MEDICAL EQUIPMENT

## (undated) DEVICE — COUNTERSINK STRYKER ORTHO CANN 4MM

## (undated) DEVICE — LABELS BLANK W PEN

## (undated) DEVICE — FRAZIER SUCTION TIP 8FR

## (undated) DEVICE — DRSG KLING 4"

## (undated) DEVICE — NDL HYPO SAFE 18G X 1.5" (PINK)

## (undated) DEVICE — TOURNIQUET CUFF 18" DUAL PORT SINGLE BLADDER LUER LOCK (BLACK)

## (undated) DEVICE — PACK LIJ BASIC ORTHO

## (undated) DEVICE — DRSG STOCKINETTE TUBULAR 6"

## (undated) DEVICE — COUNTERSINK STRYKER ORTHO CANN 7MM